# Patient Record
Sex: FEMALE | Race: WHITE
[De-identification: names, ages, dates, MRNs, and addresses within clinical notes are randomized per-mention and may not be internally consistent; named-entity substitution may affect disease eponyms.]

---

## 2017-06-07 ENCOUNTER — HOSPITAL ENCOUNTER (EMERGENCY)
Dept: HOSPITAL 75 - ER | Age: 82
Discharge: HOME | End: 2017-06-07
Payer: MEDICARE

## 2017-06-07 VITALS — SYSTOLIC BLOOD PRESSURE: 156 MMHG | DIASTOLIC BLOOD PRESSURE: 85 MMHG

## 2017-06-07 VITALS — HEIGHT: 63 IN | WEIGHT: 135 LBS | BODY MASS INDEX: 23.92 KG/M2

## 2017-06-07 DIAGNOSIS — Z87.19: ICD-10-CM

## 2017-06-07 DIAGNOSIS — L98.8: ICD-10-CM

## 2017-06-07 DIAGNOSIS — K62.5: ICD-10-CM

## 2017-06-07 DIAGNOSIS — N39.0: Primary | ICD-10-CM

## 2017-06-07 DIAGNOSIS — K59.00: ICD-10-CM

## 2017-06-07 LAB
BILIRUB UR QL STRIP: NEGATIVE
KETONES UR QL STRIP: (no result)
LEUKOCYTE ESTERASE UR QL STRIP: (no result)
NITRITE UR QL STRIP: NEGATIVE
PH UR STRIP: 5 [PH] (ref 5–9)
PROT UR QL STRIP: (no result)
SP GR UR STRIP: 1.02 (ref 1.02–1.02)
SQUAMOUS #/AREA URNS HPF: (no result) /HPF
UROBILINOGEN UR-MCNC: NORMAL MG/DL
WBC #/AREA URNS HPF: (no result) /HPF

## 2017-06-07 PROCEDURE — 81000 URINALYSIS NONAUTO W/SCOPE: CPT

## 2017-06-07 PROCEDURE — 87088 URINE BACTERIA CULTURE: CPT

## 2017-06-07 PROCEDURE — 74020: CPT

## 2017-06-07 PROCEDURE — 99284 EMERGENCY DEPT VISIT MOD MDM: CPT

## 2017-06-07 PROCEDURE — 87077 CULTURE AEROBIC IDENTIFY: CPT

## 2017-06-07 NOTE — ED GI
General


Chief Complaint:  Abdominal/GI Problems


Stated Complaint:  CONSTIPATION


Nursing Triage Note:  


CONSTIPATION SINCE SAT.  HAS TRIED TWO DIFFERENT SUPPOSITORIES.


Sepsis Screen:  No Definite Risk


Source of Information:  Patient


Exam Limitations:  No Limitations





History of Present Illness


Time Seen By Provider:  09:49


Initial Comments


This pleasant 82-year-old woman presents to the emergency room with complaints 

of difficulty producing bowel movements since Farnaz 3.  She has had a few very 

small bowel movement since then.  She has used a couple glycerin suppositories 

that were not very helpful.  She then used a medicated suppository on  

and produced another very small hard bowel movement.  The last 2 days she has 

had some small watery stools with some pink and red both in the toilet and on 

toilet paper which she presumes to be blood.  This morning she had some 

cramping.  She reports having a history of hemorrhoids and either a fistula or 

fissure.  She denies any fever, vomiting, urinary problems, or other acute 

symptoms.  Dr. Mena is her primary care provider and reportedly will be out 

of the office for a while and not available.





Allergies and Home Medications


Allergies


Coded Allergies:  


     Amoxicillin (Unverified  Allergy, Unknown, 10/16/14)


     clindamycin (Unverified  Allergy, Unknown, 10/16/14)


     sulfamethoxazole (Unverified  Allergy, Unknown, 10/16/14)


     trimethoprim (Unverified  Allergy, Unknown, 10/16/14)


Uncoded Allergies:  


     CHLORRHESILENE (Allergy, Unknown, 10/16/14)





Home Medications


Nitrofurantoin Monohyd/M-Cryst 100 Mg Capsule, 1 TAB PO BID, #10


   Prescribed by: SYLVIE MOORE on 17 1108


Polyethylene Glycol 3350 17 Gm Powd.pack, 17 GM PO BID PRN for CONSTIPATION-1ST 

LINE, #20


   Prescribed by: SYLVIE MOORE on 17 1108





Review of Systems


Constitutional:  no symptoms reported


EENTM:  No Symptoms Reported


Respiratory:  No Symptoms Reported


Cardiovascular:  No Symptoms Reported


Gastrointestinal:  See HPI


Genitourinary:  No Symptoms Reported


Musculoskeletal:  no symptoms reported


Skin:  no symptoms reported


Psychiatric/Neurological:  No Symptoms Reported


Endocrine:  No Symptoms Reported





Past Medical-Social-Family Hx


Patient Social History


Alcohol Use:  Denies Use


Recreational Drug Use:  No


Smoking Status:  Never a Smoker


Recent Foreign Travel:  No


Contact w/Someone Who Travel:  No


Recent Infectious Disease Expo:  No





Surgeries


HX Surgeries:  Yes


Surgeries:  Adenoidectomy, Breast (lumpectomy)





Respiratory


Hx Respiratory Disorders:  Yes


Respiratory Disorders:  Pneumonia





Cardiovascular


Hx Cardiac Disorders:  No





Neurological


Hx Neurological Disorders:  No





Genitourinary


Hx Genitourinary Disorders:  No





Gastrointestinal


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


Gastrointestinal Disorders:  Hemorrhoids





Musculoskeletal


Hx Musculoskeletal Disorders:  Yes





Endocrine


Hx Endocrine Disorders:  No





HEENT


HX ENT Disorders:  Yes (aerosols cause irritation)


Hearing Impairment:  Hard of Hearing





Cancer


Hx Cancer:  No





Psychosocial


Hx Psychiatric Problems:  No





Blood Transfusions


Hx Blood Disorders:  No





Physical Exam


Vital Signs





 VS - Last 72 Hours, by Label








 17





 10:02


 


Temp 98.4


 


Pulse 95


 


Resp 18


 


B/P (MAP) 148/82


 


Pulse Ox 95





Capillary Refill : Less Than 3 Seconds


General Appearance:  WD/WN, no apparent distress


HEENT:  PERRL/EOMI, normal ENT inspection


Neck:  normal inspection


Respiratory:  lungs clear, normal breath sounds, no respiratory distress, no 

accessory muscle use


Cardiovascular:  regular rate, rhythm, no edema, no murmur


Gastrointestinal:  normal bowel sounds, non tender, soft


Rectal:  normal exam, normal rectal tone, heme negative stool, No mass, No 

tenderness


Extremities:  normal inspection, no pedal edema


Neurologic/Psychiatric:  CNs II-XII nml as tested (heart of hearing), no motor/

sensory deficits, alert, normal mood/affect, oriented x 3


Skin:  normal color, warm/dry





Progress/Results/Core Measures


Results/Orders


Lab Results





Laboratory Tests








Test


  17


10:14 Range/Units


 


 


Urine Color YELLOW   


 


Urine Clarity CLEAR   


 


Urine pH 5  5-9  


 


Urine Specific Gravity 1.025 H 1.016-1.022  


 


Urine Protein 2+ H NEGATIVE  


 


Urine Glucose (UA) NEGATIVE  NEGATIVE  


 


Urine Ketones 3+ H NEGATIVE  


 


Urine Nitrite NEGATIVE  NEGATIVE  


 


Urine Bilirubin NEGATIVE  NEGATIVE  


 


Urine Urobilinogen NORMAL  NORMAL  MG/DL


 


Urine Leukocyte Esterase 2+ H NEGATIVE  


 


Urine RBC (Auto) NEGATIVE  NEGATIVE  


 


Urine RBC NONE   /HPF


 


Urine WBC 5-10 H  /HPF


 


Urine Squamous Epithelial


Cells 0-2 


   /HPF


 


 


Urine Crystals NONE   /LPF


 


Urine Bacteria FEW H  /HPF


 


Urine Casts NONE   /LPF


 


Urine Mucus SMALL H  /LPF


 


Urine Culture Indicated YES   








My Orders





Orders - SYLVIE VARGAS MD


Abdomen, Flat & Upright/Decub (17 09:49)


Ua Culture If Indicated (17 10:10)


Urine Culture (17 10:14)





Vital Signs/I&O





Vital Sign - Last 12Hours








 17





 10:02


 


Temp 98.4


 


Pulse 95


 


Resp 18


 


B/P (MAP) 148/82


 


Pulse Ox 95














Blood Pressure Mean:  104











Point of Care Testing


Fecal Occult:  Negative


Progress Note :  


Progress Note


By history, patient is presumed to have some rectal bleeding.  However, she 

does also have urinary tract infection so bleeding could have come from her 

urine.  Patient will be treated for urinary tract infection and constipation.  

Follow-up with her doctor will be recommended.





Diagnostic Imaging





   Diagonstic Imaging:  Xray


   Plain Films/CT/US/NM/MRI:  abdomen, pelvis


Comments


Abdominal x-rays reviewed by me and report reviewed.  See report below:





NAME:   VIANEY CHAMBERLAIN


[facility] REC#:   [id number]


ACCOUNT#:   [id number]


PT STATUS:   REG ER


:   1935


PHYSICIAN:   SYLVIE VARGAS MD


ADMIT DATE:   17/ER


   ***Draft***


Date of Exam:17





ABDOMEN, FLAT & UPRIGHT/DECUB








Upright and supine views of the abdomen.





INDICATION: Abdominal cramping.





FINDINGS:


There is a moderate amount of fecal material in the colon and


rectum. No bowel obstruction. No pneumoperitoneum. Kyphoscoliosis


of the thoracic and lumbar spine is seen with prominent


degenerative changes.





IMPRESSION: Moderate amounts of fecal material in the colon and


rectum seen.





  Dictated on workstation # [id number]








Dict:   17 1042


Trans:   17 1052


[facility], [place] [id number]





Interpreted by:     BRAXTON HASTINGS MD





Departure


Impression


Impression:  


 Primary Impression:  


 Constipation


 Qualified Codes:  K59.00 - Constipation, unspecified


 Additional Impressions:  


 Urinary tract infection


 Qualified Codes:  N39.0 - Urinary tract infection, site not specified


 Rectal bleeding


 Facial lesion


Disposition:  01 HOME, SELF-CARE


Condition:  Stable





Departure-Patient Inst.


Decision time for Depature:  11:00


Referrals:  


TY MENA MD (PCP/Family)


Primary Care Physician


Patient Instructions:  Constipation, Adult (DC), Urinary Tract Infection, Adult 

(DC)





Add. Discharge Instructions:  


Significantly increase your water intake.


Complete your antibiotics as prescribed.  Please be aware this antibiotic may 

cause your urine to appear oriented or red.


Follow-up with Dr. Mena as soon as possible.


Use MiraLAX (polyethylene glycol) as prescribed for constipation.


Please have your primary care provider evaluate the lesion on your left cheek.  

It may be a basal cell carcinoma.  You should consider having it removed for 

biopsy.


Return to the emergency room if symptoms worsen.











All discharge instructions reviewed with patient and/or family. Voiced 

understanding.


Scripts


Polyethylene Glycol 3350 (Miralax) 17 Gm Powd.pack


17 GM PO BID Y for CONSTIPATION-1ST LINE, #20 EACH


   Prov: SYLVIE VARGAS MD         17 


Nitrofurantoin Monohyd/M-Cryst (Macrobid 100 mg Capsule) 100 Mg Capsule


1 TAB PO BID, #10 CAP


   Prov: SYLVIE VARGAS MD         17





Copy


Copies To 1:   TY MENA MD, JOSHUA T MD 2017 11:06

## 2017-06-07 NOTE — DIAGNOSTIC IMAGING REPORT
Upright and supine views of the abdomen.



INDICATION: Abdominal cramping.



FINDINGS:

There is a moderate amount of fecal material in the colon and

rectum. No bowel obstruction. No pneumoperitoneum. Kyphoscoliosis

of the thoracic and lumbar spine is seen with prominent

degenerative changes.



IMPRESSION: Moderate amounts of fecal material in the colon and

rectum seen.



Dictated by: 



  Dictated on workstation # [id number]

## 2017-06-16 ENCOUNTER — HOSPITAL ENCOUNTER (EMERGENCY)
Dept: HOSPITAL 75 - ER | Age: 82
Discharge: HOME | End: 2017-06-16
Payer: MEDICARE

## 2017-06-16 VITALS — SYSTOLIC BLOOD PRESSURE: 120 MMHG | DIASTOLIC BLOOD PRESSURE: 64 MMHG

## 2017-06-16 VITALS — BODY MASS INDEX: 23.92 KG/M2 | WEIGHT: 135 LBS | HEIGHT: 63 IN

## 2017-06-16 DIAGNOSIS — E86.9: ICD-10-CM

## 2017-06-16 DIAGNOSIS — R19.7: ICD-10-CM

## 2017-06-16 DIAGNOSIS — Z87.19: ICD-10-CM

## 2017-06-16 DIAGNOSIS — F17.200: ICD-10-CM

## 2017-06-16 DIAGNOSIS — K62.5: Primary | ICD-10-CM

## 2017-06-16 LAB
ALBUMIN SERPL-MCNC: 3.1 GM/DL (ref 3.2–4.5)
ALT SERPL-CCNC: 6 U/L (ref 0–55)
ANION GAP SERPL CALC-SCNC: 12 MMOL/L (ref 5–14)
AST SERPL-CCNC: 17 U/L (ref 5–34)
BASOPHILS # BLD AUTO: 0 10^3/UL (ref 0–0.1)
BASOPHILS NFR BLD AUTO: 0 % (ref 0–10)
BASOPHILS NFR BLD MANUAL: 0 %
BILIRUB SERPL-MCNC: 0.5 MG/DL (ref 0.1–1)
BILIRUB UR QL STRIP: NEGATIVE
BUN SERPL-MCNC: 8 MG/DL (ref 7–18)
BUN/CREAT SERPL: 12 (ref 0–20)
CALCIUM SERPL-MCNC: 9.4 MG/DL (ref 8.5–10.1)
CHLORIDE SERPL-SCNC: 104 MMOL/L (ref 98–107)
CO2 SERPL-SCNC: 21 MMOL/L (ref 21–32)
CREAT SERPL-MCNC: 0.67 MG/DL (ref 0.6–1.3)
EOSINOPHIL # BLD AUTO: 0.1 10^3/UL (ref 0–0.3)
EOSINOPHIL NFR BLD AUTO: 1 % (ref 0–10)
EOSINOPHIL NFR BLD MANUAL: 2 %
ERYTHROCYTE [DISTWIDTH] IN BLOOD BY AUTOMATED COUNT: 12.8 % (ref 10–14.5)
GFR SERPLBLD BASED ON 1.73 SQ M-ARVRAT: > 60 ML/MIN
GLUCOSE SERPL-MCNC: 121 MG/DL (ref 70–105)
HEMOLYSIS: 43 (ref -100–29)
ICTERUS: 0.3 (ref -100–1.9)
KETONES UR QL STRIP: (no result)
LEUKOCYTE ESTERASE UR QL STRIP: NEGATIVE
LIPASE SERPL-CCNC: 42 U/L (ref 8–78)
LIPEMIA: 0 (ref -100–49)
LYMPHOCYTES # BLD AUTO: 0.5 X 10^3 (ref 1–4)
LYMPHOCYTES NFR BLD AUTO: 6 % (ref 12–44)
MCH RBC QN AUTO: 31 PG (ref 25–34)
MCHC RBC AUTO-ENTMCNC: 34 G/DL (ref 32–36)
MCV RBC AUTO: 92 FL (ref 80–99)
MONOCYTES # BLD AUTO: 1.3 X 10^3 (ref 0–1)
MONOCYTES NFR BLD AUTO: 14 % (ref 0–12)
NEUTROPHILS # BLD AUTO: 7.2 X 10^3 (ref 1.8–7.8)
NEUTROPHILS NFR BLD AUTO: 79 % (ref 42–75)
NEUTS BAND NFR BLD MANUAL: 56 %
NEUTS BAND NFR BLD: 21 %
NITRITE UR QL STRIP: NEGATIVE
PH UR STRIP: 6 [PH] (ref 5–9)
PLATELET # BLD: 313 10^3/UL (ref 130–400)
PMV BLD AUTO: 9.2 FL (ref 7.4–10.4)
POTASSIUM SERPL-SCNC: 4 MMOL/L (ref 3.6–5)
PROT SERPL-MCNC: 6.6 GM/DL (ref 6.4–8.2)
PROT UR QL STRIP: NEGATIVE
RBC # BLD AUTO: 4.32 10^6/UL (ref 4.35–5.85)
SODIUM SERPL-SCNC: 137 MMOL/L (ref 135–145)
SP GR UR STRIP: 1.01 (ref 1.02–1.02)
SQUAMOUS #/AREA URNS HPF: (no result) /HPF
UROBILINOGEN UR-MCNC: NORMAL MG/DL
VARIANT LYMPHS NFR BLD MANUAL: 9 %
WBC # BLD AUTO: 9.1 10^3/UL (ref 4.3–11)
WBC #/AREA URNS HPF: (no result) /HPF

## 2017-06-16 PROCEDURE — 81000 URINALYSIS NONAUTO W/SCOPE: CPT

## 2017-06-16 PROCEDURE — 87328 CRYPTOSPORIDIUM AG IA: CPT

## 2017-06-16 PROCEDURE — 80053 COMPREHEN METABOLIC PANEL: CPT

## 2017-06-16 PROCEDURE — 87324 CLOSTRIDIUM AG IA: CPT

## 2017-06-16 PROCEDURE — 89055 LEUKOCYTE ASSESSMENT FECAL: CPT

## 2017-06-16 PROCEDURE — 85027 COMPLETE CBC AUTOMATED: CPT

## 2017-06-16 PROCEDURE — 83690 ASSAY OF LIPASE: CPT

## 2017-06-16 PROCEDURE — 85007 BL SMEAR W/DIFF WBC COUNT: CPT

## 2017-06-16 PROCEDURE — 87449 NOS EACH ORGANISM AG IA: CPT

## 2017-06-16 PROCEDURE — 87045 FECES CULTURE AEROBIC BACT: CPT

## 2017-06-16 PROCEDURE — 87046 STOOL CULTR AEROBIC BACT EA: CPT

## 2017-06-16 PROCEDURE — 36415 COLL VENOUS BLD VENIPUNCTURE: CPT

## 2017-06-16 PROCEDURE — 87329 GIARDIA AG IA: CPT

## 2017-06-16 PROCEDURE — 74022 RADEX COMPL AQT ABD SERIES: CPT

## 2017-06-16 NOTE — ED GI
General


Chief Complaint:  Abdominal/GI Problems


Stated Complaint:  DIARRHEA/BLOODY STOOL


Nursing Triage Note:  


ADM TO ED REPORTS HAS BEEN ON ANTIBIOTICS FOR  UTI AND CONSTIPATION. IS HAVING  


BLOOD IN STOOL


Sepsis Screen:  No Definite Risk


Source of Information:  Patient


Exam Limitations:  No Limitations





History of Present Illness


Time Seen By Provider:  11:55


Initial Comments


82-year-old female patient presents to the emergency department with complaints 

of diarrhea, rectal bleeding, and generalized abdominal cramping.  Was seen 

here one week ago by Dr. Adams with complaints of constipation, UTI, and 

rectal bleeding.  Rectal exam performed by Dr. Adams at last ED visit.  

Denies fever, chills, vomiting.  Patient's antibiotics were changed Monday to 

ciprofloxacin due to resistance of bacteria to Macrobid.  Patient states she 

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  Patient is scheduled with 

Dr. Dean July 8th to establish care.


Timing/Duration:  2-3 Days, Getting Worse


Severity/Quality:  Cramping


Location:  Generalized Abdomen


Radiation:  No Radiation


Activities at Onset:  None


Modifying Factors:  Worsens With Defecating





Allergies and Home Medications


Allergies


Coded Allergies:  


     Amoxicillin (Unverified  Allergy, Unknown, 10/16/14)


     clindamycin (Unverified  Allergy, Unknown, 10/16/14)


     sulfamethoxazole (Unverified  Allergy, Unknown, 10/16/14)


     trimethoprim (Unverified  Allergy, Unknown, 10/16/14)


Uncoded Allergies:  


     CHLORRHESILENE (Allergy, Unknown, 10/16/14)





Home Medications


Ciprofloxacin HCl 500 Mg Tablet, 500 MG PO BID for 7 Days, (Reported)


Ondansetron 8 Mg Tab.rapdis, 8 MG PO Q6H PRN for NAUSEA/VOMITING-1ST LINE, #10 

Ref 0


   Prescribed by: GRACE TANNER on 6/16/17 1540


Polyethylene Glycol 3350 17 Gm Powd.pack, 17 GM PO BID PRN for CONSTIPATION-1ST 

LINE, #20


   Prescribed by: SYLVIE MOORE on 6/7/17 1108





Review of Systems


Constitutional:  No chills, No fever, No malaise


Respiratory:  No Symptoms Reported


Cardiovascular:  No Symptoms Reported


Gastrointestinal:  Denies Abdomen Distended, Abdominal Pain (generalized 

abdominal cramping), Denies Constipated, Diarrhea, Denies Nausea, Denies Poor 

Appetite, Denies Poor Fluid Intake, Rectal Bleeding, Denies Vomiting


Genitourinary:  Denies Burning, Denies Frequency, Denies Flank Pain, Denies Pain


Skin:  no symptoms reported


Psychiatric/Neurological:  No Symptoms Reported





All Other Systems Reviewed


Negative Unless Noted:  Yes (Negative excepted noted.)





Past Medical-Social-Family Hx


Patient Social History


Alcohol Use:  Denies Use


Recreational Drug Use:  No


Smoking Status:  Current Everyday Smoker


Recent Foreign Travel:  No


Contact w/Someone Who Travel:  No


Recent Infectious Disease Expo:  No





Surgeries


HX Surgeries:  Yes


Surgeries:  Adenoidectomy, Breast





Respiratory


Hx Respiratory Disorders:  Yes


Respiratory Disorders:  Pneumonia





Cardiovascular


Hx Cardiac Disorders:  No





Neurological


Hx Neurological Disorders:  No





Genitourinary


Hx Genitourinary Disorders:  No





Gastrointestinal


Hx Gastrointestinal Disorders:  Yes (anal fissure)


Gastrointestinal Disorders:  Hemorrhoids





Musculoskeletal


Hx Musculoskeletal Disorders:  Yes





Endocrine


Hx Endocrine Disorders:  No





HEENT


HX ENT Disorders:  Yes (aerosols cause irritation)


Hearing Impairment:  Hard of Hearing





Cancer


Hx Cancer:  No





Psychosocial


Hx Psychiatric Problems:  No





Blood Transfusions


Hx Blood Disorders:  No





Reviewed Nursing Assessment


Reviewed/Agree w Nursing PMH:  Yes





Family Medical History


Significant Family History:  No Pertinent Family Hx





Physical Exam


Vital Signs





 VS - Last 72 Hours, by Label








 6/16/17 6/16/17





 11:40 15:55


 


Temp 98.4 


 


Pulse 93 88


 


Resp 18 18


 


B/P (MAP) 149/97 


 


Pulse Ox  95


 


O2 Delivery  Room Air





Capillary Refill : Less Than 3 Seconds


General Appearance:  WD/WN, no apparent distress


Respiratory:  lungs clear, normal breath sounds, no respiratory distress


Cardiovascular:  regular rate, rhythm, no murmur


Gastrointestinal:  normal bowel sounds, soft, no organomegaly, No distended, No 

guarding, No rebound, tenderness (mild TTP rt lower abdomen with deep palpation 

only.), No mass


Extremities:  normal capillary refill


Back:  normal inspection, no CVA tenderness


Neurologic/Psychiatric:  alert, normal mood/affect, oriented x 3


Skin:  normal color, warm/dry





Progress/Results/Core Measures


Results/Orders


Lab Results





Laboratory Tests








Test


  6/16/17


13:04 6/16/17


13:58 Range/Units


 


 


White Blood Count


  9.1 


  


  4.3-11.0


10^3/uL


 


Red Blood Count


  4.32 L


  


  4.35-5.85


10^6/uL


 


Hemoglobin 13.5   11.5-16.0  G/DL


 


Hematocrit 40   35-52  %


 


Mean Corpuscular Volume 92   80-99  FL


 


Mean Corpuscular Hemoglobin 31   25-34  PG


 


Mean Corpuscular Hemoglobin


Concent 34 


  


  32-36  G/DL


 


 


Red Cell Distribution Width 12.8   10.0-14.5  %


 


Platelet Count


  313 


  


  130-400


10^3/uL


 


Mean Platelet Volume 9.2   7.4-10.4  FL


 


Neutrophils (%) (Auto) 79 H  42-75  %


 


Lymphocytes (%) (Auto) 6 L  12-44  %


 


Monocytes (%) (Auto) 14 H  0-12  %


 


Eosinophils (%) (Auto) 1   0-10  %


 


Basophils (%) (Auto) 0   0-10  %


 


Neutrophils # (Auto) 7.2   1.8-7.8  X 10^3


 


Lymphocytes # (Auto) 0.5 L  1.0-4.0  X 10^3


 


Monocytes # (Auto) 1.3 H  0.0-1.0  X 10^3


 


Eosinophils # (Auto)


  0.1 


  


  0.0-0.3


10^3/uL


 


Basophils # (Auto)


  0.0 


  


  0.0-0.1


10^3/uL


 


Neutrophils % (Manual) 56    %


 


Lymphocytes % (Manual) 9    %


 


Monocytes % (Manual) 12    %


 


Eosinophils % (Manual) 2    %


 


Basophils % (Manual) 0    %


 


Band Neutrophils 21    %


 


Blood Morphology Comment NORMAL    


 


Sodium Level 137   135-145  MMOL/L


 


Potassium Level 4.0   3.6-5.0  MMOL/L


 


Chloride Level 104     MMOL/L


 


Carbon Dioxide Level 21   21-32  MMOL/L


 


Anion Gap 12   5-14  MMOL/L


 


Blood Urea Nitrogen 8   7-18  MG/DL


 


Creatinine


  0.67 


  


  0.60-1.30


MG/DL


 


Estimat Glomerular Filtration


Rate > 60 


  


   


 


 


BUN/Creatinine Ratio 12   0-20  


 


Glucose Level 121 H    MG/DL


 


Calcium Level 9.4   8.5-10.1  MG/DL


 


Total Bilirubin 0.5   0.1-1.0  MG/DL


 


Aspartate Amino Transf


(AST/SGOT) 17 


  


  5-34  U/L


 


 


Alanine Aminotransferase


(ALT/SGPT) 6 


  


  0-55  U/L


 


 


Alkaline Phosphatase 107     U/L


 


Total Protein 6.6   6.4-8.2  GM/DL


 


Albumin 3.1 L  3.2-4.5  GM/DL


 


Lipase 42   8-78  U/L


 


Urine Color  YELLOW   


 


Urine Clarity  CLEAR   


 


Urine pH  6  5-9  


 


Urine Specific Gravity  1.010 L 1.016-1.022  


 


Urine Protein  NEGATIVE  NEGATIVE  


 


Urine Glucose (UA)  NEGATIVE  NEGATIVE  


 


Urine Ketones  2+ H NEGATIVE  


 


Urine Nitrite  NEGATIVE  NEGATIVE  


 


Urine Bilirubin  NEGATIVE  NEGATIVE  


 


Urine Urobilinogen  NORMAL  NORMAL  MG/DL


 


Urine Leukocyte Esterase  NEGATIVE  NEGATIVE  


 


Urine RBC (Auto)  NEGATIVE  NEGATIVE  


 


Urine RBC  NONE   /HPF


 


Urine WBC  NONE   /HPF


 


Urine Squamous Epithelial


Cells 


  2-5 


   /HPF


 


 


Urine Crystals  NONE   /LPF


 


Urine Bacteria  NEGATIVE   /HPF


 


Urine Casts  NONE   /LPF


 


Urine Mucus  NEGATIVE   /LPF


 


Urine Culture Indicated  NO   








Micro Results





Microbiology


6/16/17 Fecal Leukocyte Stain, Resulted


          Pending


6/16/17 C. difficile GDH Antigen & Toxins - Final, Resulted


          


6/16/17 Stool Culture, Resulted


          Pending





My Orders





Orders - GRACE TANNER


Cbc With Automated Diff (6/16/17 12:10)


Comprehensive Metabolic Panel (6/16/17 12:10)


Lipase (6/16/17 12:10)


Ua Culture If Indicated (6/16/17 12:10)


Saline Lock/Iv-Start (6/16/17 12:10)


Acute Abd Series (6/16/17 12:10)


Ns Iv 1000 Ml (Sodium Chloride 0.9%) (6/16/17 12:10)


Manual Differential (6/16/17 13:04)


Stool Culture (6/16/17 14:02)


Fecal Wbc (6/16/17 14:02)


C Difficile Ag + Toxin A/B. (6/16/17 14:02)


Fecal Occult Bedside (6/16/17 14:03)


Parasite Scrn Stool Giard Cryp (6/16/17 13:59)





Medications Given in ED





Vital Signs/I&O





Vital Sign - Last 12Hours








 6/16/17 6/16/17





 11:40 15:55


 


Temp 98.4 


 


Pulse 93 88


 


Resp 18 18


 


B/P (MAP) 149/97 


 


Pulse Ox  95


 


O2 Delivery  Room Air














Intake and Output 


 


 6/17/17





 00:00


 


Intake Total 1000 ml


 


Balance 1000 ml














Blood Pressure Mean:  114











Diagnostic Imaging





   Diagonstic Imaging:  Xray


   Plain Films/CT/US/NM/MRI:  abdomen


Comments


DISCUSSION: Supine and upright views of the abdomen and chest were obtained, 

comparison 06/07/2017. Changes of chronic lung disease are noted with pulmonary 

hyperinflation and interstitial thickening. Normal heart size. No pleural fluid 

or pneumothorax. Levoscoliosis of the thoracolumbar spine is stable. 

Nonobstructive unremarkable bowel gas pattern. No constipation. Nondilated 

small bowel loops are noted which could be seen with enteritis or other 

inflammatory etiologies. Recommend clinical correlation. IMPRESSION: 1. 

Nonobstructive abnormal small bowel gas pattern is nonspecific though could be 

seen with enteritis. No evidence of obstruction or free air. No constipation. 

Dictated by: Dictated on workstation # [id number]


   Reviewed:  Reviewed by Me (radiology report reviewed by me. )





Departure


Communication


Progress Notes


All laboratory findings discussed with the patient.  One diarrhea stool noted 

while in the emergency department.  Patient to follow-up with Dr. Dean as 

previously scheduled or sooner if needed.  Plan for discharge to home.





Impression


Impression:  


 Primary Impression:  


 Diarrhea


 Qualified Codes:  R19.7 - Diarrhea, unspecified


 Additional Impressions:  


 Rectal bleeding


 Volume depletion


Disposition:  01 HOME, SELF-CARE


Condition:  Improved





Departure-Patient Inst.


Decision time for Depature:  14:19


Referrals:  


FIDEL YADAV MD, JOHN D MD (PCP/Family)


Primary Care Physician


Patient Instructions:  Bloody Stools, Adult (DC), Diarrhea in Adolescents and 

Adults, Dehydration, Adult (DC)





Add. Discharge Instructions:  


All discharge instructions reviewed with patient and/or family. Voiced 

understanding.  Stop ciprofloxacin.  Drink plenty of fluids.  Medications as 

instructed.  Follow-up with Dr. Dean's office for recheck as an outpatient, 

call for appointment time Monday morning.  Follow-up with Dr. Yadav office 

for possible need of colonoscopy.  Call for appointment time.  Return to the 

emergency department for worsened pain, rectal bleeding, vomiting, decreased 

urination, fever, or any other concerns.


Scripts


Ondansetron (Ondansetron Odt) 8 Mg Tab.rapdis


8 MG PO Q6H Y for NAUSEA/VOMITING-1ST LINE, #10 TAB 0 Refills


   Prov: GRACE TANNER         6/16/17











GRACE TANNER Jun 16, 2017 12:28

## 2017-06-30 ENCOUNTER — HOSPITAL ENCOUNTER (EMERGENCY)
Dept: HOSPITAL 75 - ER | Age: 82
Discharge: HOME | End: 2017-06-30
Payer: MEDICARE

## 2017-06-30 VITALS — SYSTOLIC BLOOD PRESSURE: 114 MMHG | DIASTOLIC BLOOD PRESSURE: 65 MMHG

## 2017-06-30 VITALS — BODY MASS INDEX: 22.32 KG/M2 | WEIGHT: 126 LBS | HEIGHT: 63 IN

## 2017-06-30 DIAGNOSIS — R19.7: Primary | ICD-10-CM

## 2017-06-30 LAB
ALBUMIN SERPL-MCNC: 2.5 GM/DL (ref 3.2–4.5)
ALT SERPL-CCNC: 11 U/L (ref 0–55)
ANION GAP SERPL CALC-SCNC: 12 MMOL/L (ref 5–14)
AST SERPL-CCNC: 19 U/L (ref 5–34)
BASOPHILS # BLD AUTO: 0 10^3/UL (ref 0–0.1)
BASOPHILS NFR BLD AUTO: 1 % (ref 0–10)
BASOPHILS NFR BLD MANUAL: 1 %
BILIRUB SERPL-MCNC: 0.8 MG/DL (ref 0.1–1)
BUN SERPL-MCNC: 8 MG/DL (ref 7–18)
BUN/CREAT SERPL: 9
CALCIUM SERPL-MCNC: 8.8 MG/DL (ref 8.5–10.1)
CHLORIDE SERPL-SCNC: 101 MMOL/L (ref 98–107)
CO2 SERPL-SCNC: 22 MMOL/L (ref 21–32)
CREAT SERPL-MCNC: 0.89 MG/DL (ref 0.6–1.3)
EOSINOPHIL # BLD AUTO: 0.1 10^3/UL (ref 0–0.3)
EOSINOPHIL NFR BLD AUTO: 1 % (ref 0–10)
EOSINOPHIL NFR BLD MANUAL: 0 %
ERYTHROCYTE [DISTWIDTH] IN BLOOD BY AUTOMATED COUNT: 13.1 % (ref 10–14.5)
GFR SERPLBLD BASED ON 1.73 SQ M-ARVRAT: > 60 ML/MIN
GLUCOSE SERPL-MCNC: 151 MG/DL (ref 70–105)
LYMPHOCYTES # BLD AUTO: 0.6 X 10^3 (ref 1–4)
LYMPHOCYTES NFR BLD AUTO: 7 % (ref 12–44)
MCH RBC QN AUTO: 30 PG (ref 25–34)
MCHC RBC AUTO-ENTMCNC: 34 G/DL (ref 32–36)
MCV RBC AUTO: 89 FL (ref 80–99)
MONOCYTES # BLD AUTO: 1.3 X 10^3 (ref 0–1)
MONOCYTES NFR BLD AUTO: 16 % (ref 0–12)
NEUTROPHILS # BLD AUTO: 6.1 X 10^3 (ref 1.8–7.8)
NEUTROPHILS NFR BLD AUTO: 76 % (ref 42–75)
NEUTS BAND NFR BLD MANUAL: 64 %
NEUTS BAND NFR BLD: 16 %
PLATELET # BLD: 508 10^3/UL (ref 130–400)
PMV BLD AUTO: 9 FL (ref 7.4–10.4)
POTASSIUM SERPL-SCNC: 3.6 MMOL/L (ref 3.6–5)
PROT SERPL-MCNC: 5.9 GM/DL (ref 6.4–8.2)
RBC # BLD AUTO: 4.33 10^6/UL (ref 4.35–5.85)
SODIUM SERPL-SCNC: 135 MMOL/L (ref 135–145)
VARIANT LYMPHS NFR BLD MANUAL: 8 %
WBC # BLD AUTO: 8.1 10^3/UL (ref 4.3–11)

## 2017-06-30 PROCEDURE — 80053 COMPREHEN METABOLIC PANEL: CPT

## 2017-06-30 PROCEDURE — 36415 COLL VENOUS BLD VENIPUNCTURE: CPT

## 2017-06-30 PROCEDURE — 74000: CPT

## 2017-06-30 PROCEDURE — 85007 BL SMEAR W/DIFF WBC COUNT: CPT

## 2017-06-30 PROCEDURE — 85027 COMPLETE CBC AUTOMATED: CPT

## 2017-06-30 NOTE — DIAGNOSTIC IMAGING REPORT
Supine view of the abdomen.



INDICATION: Abdominal cramping.



FINDINGS:

There is a moderate to large amounts of fecal material seen in

the distal colon and rectum. No significantly dilated bowel loops

seen. No soft tissue mass is evident. 2 mm calcification in the

left flank could be a kidney stone. There is also right flank

calcifications indeterminate. There is prominent scoliosis and

degenerative changes in the lumbar spine.



IMPRESSION: Moderate to large amounts of fecal material in the

distal colon and rectum.



Dictated by: 



  Dictated on workstation # [id number]

## 2017-06-30 NOTE — ED GI
General


Chief Complaint:  Abdominal/GI Problems


Stated Complaint:  DIARRHEA


Nursing Triage Note:  


PT STATES DIARRHEA AND ABD CRAMPING OFF AND ON FOR A MONTH.  HAS BEEN SEEN HERE 


AND AT THE CLINIC OVER THE LAST MONTH.  ALSO NECK PAIN FROM STRAINING TO GET UP 


BUT HAS A HX OF NECK PAIN.  VOMITED ONCE THIS A.M.  RECENT UTI AND YEAST 


INFECTION.


Sepsis Screen:  No Definite Risk


Source of Information:  Patient, Family


Exam Limitations:  No Limitations





History of Present Illness


Time Seen By Provider:  12:20


Initial Comments


The patient is an 82-year-old white female who is on her third ER visit this 

month.  She was previously here on 6/7 complaining of constipation.  She then 

returned on 6/16 complaining of diarrhea.  In the interim she had taken Cipro 

from 6/12 through 16 and a study was done for Clostridium difficile which was 

negative.  She continues to complain of diarrhea.  She states that she had the 

sense that she has to go frequently and then passes a small amount of liquid 

stool.  There is crampy abdominal pain but not severe pain she has trimmed her 

diet to a mostly liquid diet.


Timing/Duration:  Other (1 month)





Allergies and Home Medications


Allergies


Coded Allergies:  


     Amoxicillin (Unverified  Allergy, Unknown, 10/16/14)


     clindamycin (Unverified  Allergy, Unknown, 10/16/14)


     sulfamethoxazole (Unverified  Allergy, Unknown, 10/16/14)


     trimethoprim (Unverified  Allergy, Unknown, 10/16/14)


Uncoded Allergies:  


     CHLORRHESILENE (Allergy, Unknown, 10/16/14)





Home Medications


Ciprofloxacin HCl 500 Mg Tablet, 500 MG PO BID for 7 Days, (Reported)


Ondansetron 8 Mg Tab.rapdis, 8 MG PO Q6H PRN for NAUSEA/VOMITING-1ST LINE, #10 

Ref 0


   Prescribed by: GRACE TANNER on 6/16/17 1540


Polyethylene Glycol 3350 17 Gm Powd.pack, 17 GM PO BID PRN for CONSTIPATION-1ST 

LINE, #20


   Prescribed by: SYLVIE MOORE on 6/7/17 1108





Review of Systems


Constitutional:  see HPI


EENTM:  No Symptoms Reported


Respiratory:  No Symptoms Reported


Cardiovascular:  No Symptoms Reported


Gastrointestinal:  See HPI, Abdomen Distended, Abdominal Pain, Diarrhea, Nausea


Genitourinary:  No Symptoms Reported


Musculoskeletal:  no symptoms reported


Skin:  no symptoms reported


Psychiatric/Neurological:  No Symptoms Reported


Endocrine:  No Symptoms Reported


Hematologic/Lymphatic:  No Symptoms Reported





Past Medical-Social-Family Hx


Patient Social History


Alcohol Use:  Denies Use


Recreational Drug Use:  No


Smoking Status:  Never a Smoker


Recent Foreign Travel:  No


Contact w/Someone Who Travel:  No


Recent Infectious Disease Expo:  No


Recent Hopitalizations:  No





Seasonal Allergies


Seasonal Allergies:  No





Surgeries


HX Surgeries:  Yes


Surgeries:  Adenoidectomy, Breast





Respiratory


Hx Respiratory Disorders:  Yes


Respiratory Disorders:  Pneumonia





Cardiovascular


Hx Cardiac Disorders:  No





Neurological


Hx Neurological Disorders:  No





Genitourinary


Hx Genitourinary Disorders:  No





Gastrointestinal


Hx Gastrointestinal Disorders:  Yes (anal fissure)


Gastrointestinal Disorders:  Hemorrhoids





Musculoskeletal


Hx Musculoskeletal Disorders:  Yes





Endocrine


Hx Endocrine Disorders:  No





HEENT


HX ENT Disorders:  Yes (aerosols cause irritation)


Hearing Impairment:  Hard of Hearing





Cancer


Hx Cancer:  No





Psychosocial


Hx Psychiatric Problems:  No





Blood Transfusions


Hx Blood Disorders:  No





Family Medical History


Significant Family History:  No Pertinent Family Hx





Physical Exam


Vital Signs





 VS - Last 72 Hours, by Label








 6/30/17





 10:54


 


Temp 98.4


 


Pulse 96


 


Resp 20


 


B/P (MAP) 140/65


 


Pulse Ox 95


 


O2 Delivery Room Air





Capillary Refill : Less Than 3 Seconds


General Appearance:  no apparent distress, other (somewhat hard of hearing)


HEENT:  normal ENT inspection


Neck:  full range of motion


Respiratory:  chest non-tender, lungs clear, normal breath sounds, no 

respiratory distress, no accessory muscle use


Cardiovascular:  normal peripheral pulses, regular rate, rhythm, no edema, no 

gallop, no JVD, no murmur


Gastrointestinal:  normal bowel sounds, other (fullness to palpation in the 

pelvis with minimal pain to palpation)


Rectal:  other (no stool to palpation in the rectal vault but palpable stool 

through the bowel wall above it)


Extremities:  normal range of motion, non-tender, normal inspection, no pedal 

edema, no calf tenderness, normal capillary refill, pelvis stable


Pelvic:  other (lumbar scoliosis)


Neurologic/Psychiatric:  CNs II-XII nml as tested, no motor/sensory deficits, 

alert, normal mood/affect, oriented x 3


Skin:  normal color, warm/dry





Progress/Results/Core Measures


Results/Orders


Lab Results





Laboratory Tests








Test


  6/30/17


11:25 Range/Units


 


 


White Blood Count


  8.1 


  4.3-11.0


10^3/uL


 


Red Blood Count


  4.33 L


  4.35-5.85


10^6/uL


 


Hemoglobin 13.0  11.5-16.0  G/DL


 


Hematocrit 39  35-52  %


 


Mean Corpuscular Volume 89  80-99  FL


 


Mean Corpuscular Hemoglobin 30  25-34  PG


 


Mean Corpuscular Hemoglobin


Concent 34 


  32-36  G/DL


 


 


Red Cell Distribution Width 13.1  10.0-14.5  %


 


Platelet Count


  508 H


  130-400


10^3/uL


 


Mean Platelet Volume 9.0  7.4-10.4  FL


 


Neutrophils (%) (Auto) 76 H 42-75  %


 


Lymphocytes (%) (Auto) 7 L 12-44  %


 


Monocytes (%) (Auto) 16 H 0-12  %


 


Eosinophils (%) (Auto) 1  0-10  %


 


Basophils (%) (Auto) 1  0-10  %


 


Neutrophils # (Auto) 6.1  1.8-7.8  X 10^3


 


Lymphocytes # (Auto) 0.6 L 1.0-4.0  X 10^3


 


Monocytes # (Auto) 1.3 H 0.0-1.0  X 10^3


 


Eosinophils # (Auto)


  0.1 


  0.0-0.3


10^3/uL


 


Basophils # (Auto)


  0.0 


  0.0-0.1


10^3/uL


 


Neutrophils % (Manual) 64   %


 


Lymphocytes % (Manual) 8   %


 


Monocytes % (Manual) 11   %


 


Eosinophils % (Manual) 0   %


 


Basophils % (Manual) 1   %


 


Band Neutrophils 16   %


 


Blood Morphology Comment NORMAL   


 


Sodium Level 135  135-145  MMOL/L


 


Potassium Level 3.6  3.6-5.0  MMOL/L


 


Chloride Level 101    MMOL/L


 


Carbon Dioxide Level 22  21-32  MMOL/L


 


Anion Gap 12  5-14  MMOL/L


 


Blood Urea Nitrogen 8  7-18  MG/DL


 


Creatinine


  0.89 


  0.60-1.30


MG/DL


 


Estimat Glomerular Filtration


Rate > 60 


   


 


 


BUN/Creatinine Ratio 9   


 


Glucose Level 151 H   MG/DL


 


Calcium Level 8.8  8.5-10.1  MG/DL


 


Total Bilirubin 0.8  0.1-1.0  MG/DL


 


Aspartate Amino Transf


(AST/SGOT) 19 


  5-34  U/L


 


 


Alanine Aminotransferase


(ALT/SGPT) 11 


  0-55  U/L


 


 


Alkaline Phosphatase 106    U/L


 


Total Protein 5.9 L 6.4-8.2  GM/DL


 


Albumin 2.5 L 3.2-4.5  GM/DL








My Orders





Orders - VALENTÍN AMATO MD


Cbc With Automated Diff (6/30/17 11:07)


Comprehensive Metabolic Panel (6/30/17 11:07)


Abdomen/Kub 1view (6/30/17 11:07)


Manual Differential (6/30/17 11:25)





Vital Signs/I&O





Vital Sign - Last 12Hours








 6/30/17





 10:54


 


Temp 98.4


 


Pulse 96


 


Resp 20


 


B/P (MAP) 140/65


 


Pulse Ox 95


 


O2 Delivery Room Air














Blood Pressure Mean:  90











Departure


Communication


Progress Notes


The KUB shows considerable stool in the pelvis and large bowel.  There is 

considerable gas in the transverse colon with out evidence of obstruction





Impression


Impression:  


 Primary Impression:  


 obstipation


Disposition:  01 HOME, SELF-CARE


Condition:  Stable/Unchanged





Departure-Patient Inst.


Decision time for Depature:  12:35


Referrals:  


TY MENA MD (PCP/Family)


Primary Care Physician


Patient Instructions:  Constipation, Adult (DC)











VALENTÍN AMATO MD Jun 30, 2017 12:33

## 2017-07-04 ENCOUNTER — HOSPITAL ENCOUNTER (INPATIENT)
Dept: HOSPITAL 75 - ER | Age: 82
LOS: 3 days | Discharge: NURSING, REHAB OR LONG-TERM CARE | DRG: 470 | End: 2017-07-07
Attending: ORTHOPAEDIC SURGERY | Admitting: ORTHOPAEDIC SURGERY
Payer: MEDICARE

## 2017-07-04 VITALS — WEIGHT: 126 LBS | HEIGHT: 63 IN | BODY MASS INDEX: 22.32 KG/M2

## 2017-07-04 VITALS — SYSTOLIC BLOOD PRESSURE: 118 MMHG | DIASTOLIC BLOOD PRESSURE: 68 MMHG

## 2017-07-04 VITALS — SYSTOLIC BLOOD PRESSURE: 128 MMHG | DIASTOLIC BLOOD PRESSURE: 58 MMHG

## 2017-07-04 DIAGNOSIS — K52.9: ICD-10-CM

## 2017-07-04 DIAGNOSIS — S76.011A: ICD-10-CM

## 2017-07-04 DIAGNOSIS — R60.0: ICD-10-CM

## 2017-07-04 DIAGNOSIS — E87.6: ICD-10-CM

## 2017-07-04 DIAGNOSIS — E87.1: ICD-10-CM

## 2017-07-04 DIAGNOSIS — M25.571: ICD-10-CM

## 2017-07-04 DIAGNOSIS — S72.011A: Primary | ICD-10-CM

## 2017-07-04 DIAGNOSIS — M54.2: ICD-10-CM

## 2017-07-04 DIAGNOSIS — M25.561: ICD-10-CM
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
## 2017-07-04 DIAGNOSIS — E46: ICD-10-CM

## 2017-07-04 LAB
ALBUMIN SERPL-MCNC: 2.1 GM/DL (ref 3.2–4.5)
ALT SERPL-CCNC: 14 U/L (ref 0–55)
ANION GAP SERPL CALC-SCNC: 6 MMOL/L (ref 5–14)
APTT BLD: 30 SEC (ref 24–35)
AST SERPL-CCNC: 20 U/L (ref 5–34)
BASOPHILS # BLD AUTO: 0 10^3/UL (ref 0–0.1)
BASOPHILS NFR BLD AUTO: 0 % (ref 0–10)
BASOPHILS NFR BLD MANUAL: 0 %
BILIRUB SERPL-MCNC: 0.5 MG/DL (ref 0.1–1)
BILIRUB UR QL STRIP: NEGATIVE
BUN SERPL-MCNC: 19 MG/DL (ref 7–18)
BUN/CREAT SERPL: 15
CALCIUM SERPL-MCNC: 8 MG/DL (ref 8.5–10.1)
CHLORIDE SERPL-SCNC: 101 MMOL/L (ref 98–107)
CO2 SERPL-SCNC: 26 MMOL/L (ref 21–32)
CREAT SERPL-MCNC: 1.27 MG/DL (ref 0.6–1.3)
EOSINOPHIL # BLD AUTO: 0 10^3/UL (ref 0–0.3)
EOSINOPHIL NFR BLD AUTO: 0 % (ref 0–10)
EOSINOPHIL NFR BLD MANUAL: 0 %
ERYTHROCYTE [DISTWIDTH] IN BLOOD BY AUTOMATED COUNT: 13.3 % (ref 10–14.5)
GFR SERPLBLD BASED ON 1.73 SQ M-ARVRAT: 40 ML/MIN
GLUCOSE SERPL-MCNC: 158 MG/DL (ref 70–105)
INR PPP: 1.3 (ref 0.8–1.4)
KETONES UR QL STRIP: NEGATIVE
LEUKOCYTE ESTERASE UR QL STRIP: (no result)
LYMPHOCYTES # BLD AUTO: 0.6 X 10^3 (ref 1–4)
LYMPHOCYTES NFR BLD AUTO: 6 % (ref 12–44)
MCH RBC QN AUTO: 30 PG (ref 25–34)
MCHC RBC AUTO-ENTMCNC: 33 G/DL (ref 32–36)
MCV RBC AUTO: 89 FL (ref 80–99)
MONOCYTES # BLD AUTO: 1.1 X 10^3 (ref 0–1)
MONOCYTES NFR BLD AUTO: 11 % (ref 0–12)
NEUTROPHILS # BLD AUTO: 8.2 X 10^3 (ref 1.8–7.8)
NEUTROPHILS NFR BLD AUTO: 82 % (ref 42–75)
NEUTS BAND NFR BLD MANUAL: 80 %
NEUTS BAND NFR BLD: 8 %
NITRITE UR QL STRIP: NEGATIVE
PH UR STRIP: 6 [PH] (ref 5–9)
PLATELET # BLD: 367 10^3/UL (ref 130–400)
PMV BLD AUTO: 8.5 FL (ref 7.4–10.4)
POTASSIUM SERPL-SCNC: 3.3 MMOL/L (ref 3.6–5)
PROT SERPL-MCNC: 4.9 GM/DL (ref 6.4–8.2)
PROT UR QL STRIP: NEGATIVE
PROTHROMBIN TIME: 16.2 SEC (ref 12.2–14.7)
RBC # BLD AUTO: 3.82 10^6/UL (ref 4.35–5.85)
SODIUM SERPL-SCNC: 133 MMOL/L (ref 135–145)
SP GR UR STRIP: 1.01 (ref 1.02–1.02)
SQUAMOUS #/AREA URNS HPF: (no result) /HPF
UROBILINOGEN UR-MCNC: NORMAL MG/DL
VARIANT LYMPHS NFR BLD MANUAL: 10 %
WBC # BLD AUTO: 9.9 10^3/UL (ref 4.3–11)
WBC #/AREA URNS HPF: (no result) /HPF

## 2017-07-04 PROCEDURE — 87449 NOS EACH ORGANISM AG IA: CPT

## 2017-07-04 PROCEDURE — 96374 THER/PROPH/DIAG INJ IV PUSH: CPT

## 2017-07-04 PROCEDURE — 36415 COLL VENOUS BLD VENIPUNCTURE: CPT

## 2017-07-04 PROCEDURE — 81000 URINALYSIS NONAUTO W/SCOPE: CPT

## 2017-07-04 PROCEDURE — 85007 BL SMEAR W/DIFF WBC COUNT: CPT

## 2017-07-04 PROCEDURE — 72170 X-RAY EXAM OF PELVIS: CPT

## 2017-07-04 PROCEDURE — 86850 RBC ANTIBODY SCREEN: CPT

## 2017-07-04 PROCEDURE — 87081 CULTURE SCREEN ONLY: CPT

## 2017-07-04 PROCEDURE — 0SRR01A REPLACEMENT OF RIGHT HIP JOINT, FEMORAL SURFACE WITH METAL SYNTHETIC SUBSTITUTE, UNCEMENTED, OPEN APPROACH: ICD-10-PCS | Performed by: ORTHOPAEDIC SURGERY

## 2017-07-04 PROCEDURE — 86900 BLOOD TYPING SEROLOGIC ABO: CPT

## 2017-07-04 PROCEDURE — 80053 COMPREHEN METABOLIC PANEL: CPT

## 2017-07-04 PROCEDURE — 85730 THROMBOPLASTIN TIME PARTIAL: CPT

## 2017-07-04 PROCEDURE — 73502 X-RAY EXAM HIP UNI 2-3 VIEWS: CPT

## 2017-07-04 PROCEDURE — 73610 X-RAY EXAM OF ANKLE: CPT

## 2017-07-04 PROCEDURE — 85025 COMPLETE CBC W/AUTO DIFF WBC: CPT

## 2017-07-04 PROCEDURE — 73562 X-RAY EXAM OF KNEE 3: CPT

## 2017-07-04 PROCEDURE — 94664 DEMO&/EVAL PT USE INHALER: CPT

## 2017-07-04 PROCEDURE — 93005 ELECTROCARDIOGRAM TRACING: CPT

## 2017-07-04 PROCEDURE — 80048 BASIC METABOLIC PNL TOTAL CA: CPT

## 2017-07-04 PROCEDURE — 96361 HYDRATE IV INFUSION ADD-ON: CPT

## 2017-07-04 PROCEDURE — 51702 INSERT TEMP BLADDER CATH: CPT

## 2017-07-04 PROCEDURE — 86901 BLOOD TYPING SEROLOGIC RH(D): CPT

## 2017-07-04 PROCEDURE — 87324 CLOSTRIDIUM AG IA: CPT

## 2017-07-04 PROCEDURE — 0LQJ0ZZ REPAIR RIGHT HIP TENDON, OPEN APPROACH: ICD-10-PCS | Performed by: ORTHOPAEDIC SURGERY

## 2017-07-04 PROCEDURE — 72192 CT PELVIS W/O DYE: CPT

## 2017-07-04 PROCEDURE — 85610 PROTHROMBIN TIME: CPT

## 2017-07-04 PROCEDURE — 85027 COMPLETE CBC AUTOMATED: CPT

## 2017-07-04 RX ADMIN — SODIUM CHLORIDE AND POTASSIUM CHLORIDE SCH MLS/HR: 9; 1.49 INJECTION, SOLUTION INTRAVENOUS at 23:13

## 2017-07-04 RX ADMIN — SODIUM CHLORIDE AND POTASSIUM CHLORIDE SCH MLS/HR: 9; 1.49 INJECTION, SOLUTION INTRAVENOUS at 16:18

## 2017-07-04 NOTE — DIAGNOSTIC IMAGING REPORT
INDICATION:  Status post fall earlier in the day with hip pain



TECHNIQUE:  2 views of the right hip, 2:06 PM.



CORRELATION STUDY:  None



FINDINGS: Overall somewhat limited visualization, there does

appear to be foreshortening of the right femoral neck with what

appears to be likely a subcapital femoral neck fracture. Femoral

head acetabular relationship is maintained.  Remainder of right

hemipelvis intact.



IMPRESSION: 

1. Though somewhat limited visualization there does appear to

likely impacted right subcapital femoral neck fracture. If

further assessment desired, CT and/or MRI recommended.



Dictated by: 



  Dictated on workstation # [id number]

## 2017-07-04 NOTE — PROGRESS NOTE-PRE OPERATIVE
Pre-Operative Progress Note


H&P Reviewed


The H&P was reviewed, patient examined and no changes noted.


Date Seen by Provider:  Jul 4, 2017


Time Seen by Provider:  19:19


Date H&P Reviewed:  Jul 4, 2017


Time H&P Reviewed:  19:19


Pre-Operative Diagnosis:  displaced right femoral neck fracture











JOSE ALAMO Jul 4, 2017 7:19 pm

## 2017-07-04 NOTE — ED FALL/INJURY
General


Chief Complaint:  Trauma-Non Activation


Stated Complaint:  FALL/RT KNEE PAIN


Nursing Triage Note:  


PT ARRIVED PER EMS, PT HAD FALL AT HOME STATES HAS R HIP AND R KNEE PAIN, PT 


DENIES HITTING HEAD OR LOC, PT HAS SL IN PLACE R WRIST #20 BY EMS, PT WAS GIVEN 


[id number] BY EMS. PT VERY NON-SPECIFIC CO PAIN. PT LOWER EXT SWOLLEN, R KNEE 


SWOLLEN, PT STATES HAVE BEEN LIKE THIS FOR A WHILE


Source:  patient, EMS


Exam Limitations:  no limitations





History of Present Illness


Time seen by provider:  13:35


Initial Comments


This 82-year-old woman presents to the emergency room after having a fall at 

home.  She fell onto her right side for unknown reasons.  She denies any 

prodrome.  She denies any head or neck injury.  She has chronic and unchanged 

neck pain.  She also complains of lower extremity bilateral edema, right 

greater than left.  This edema fluctuates but is overall chronic in nature 

without much change.  She has recently had problems with constipation and 

diarrhea alternating.  She arrives via EMS.  Vital signs are reportedly stable.


Location Injury Occurred:  HOME


Occurred:  just prior to arrival


Context:  unknown


Loss of Consciousness:  no loss of consciousness





Allergies and Home Medications


Allergies


Coded Allergies:  


     nystatin (Verified  Allergy, Mild, HIVES, 17)


     amoxicillin (Unverified  Allergy, Unknown, 10/16/14)


     chlorhexidine (Verified  Allergy, Unknown, HIVES, 17)


     clindamycin (Unverified  Allergy, Unknown, 10/16/14)


     sulfamethoxazole (Unverified  Allergy, Unknown, 10/16/14)


     trimethoprim (Unverified  Allergy, Unknown, 10/16/14)





Home Medications


Aspirin 325 Mg Tablet.dr, 325 MG PO DAILY PRN for PAIN-MILD, (Reported)


Loratadine 10 Mg Tablet, 10 MG PO DAILY PRN for ALLERGIES, (Reported)





Constitutional:  no symptoms reported


Eyes:  No Symptoms Reported


Ears, Nose, Mouth, Throat:  no symptoms reported


Respiratory:  no symptoms reported


Cardiovascular:  edema


Gastrointestinal:  no symptoms reported


Genitourinary:  no symptoms reported


Musculoskeletal:  see HPI


Skin:  no symptoms reported


Psychiatric/Neurological:  No Symptoms Reported





Past Medical-Social-Family Hx


Patient Social History


Alcohol Use:  Denies Use


Recreational Drug Use:  No


Smoking Status:  Never a Smoker


Recent Foreign Travel:  No


Contact w/Someone Who Travel:  No


Recent Infectious Disease Expo:  No


Recent Hopitalizations:  No





Seasonal Allergies


Seasonal Allergies:  No





Surgeries


HX Surgeries:  Yes


Surgeries:  Adenoidectomy, Breast





Respiratory


Hx Respiratory Disorders:  Yes


Respiratory Disorders:  Pneumonia





Cardiovascular


Hx Cardiac Disorders:  Yes


Cardiac Disorders:  Chronic Edema/Swelling





Neurological


Hx Neurological Disorders:  No





Reproductive System


Pregnant:  No





Genitourinary


Hx Genitourinary Disorders:  No





Gastrointestinal


Hx Gastrointestinal Disorders:  Yes (anal fissure)


Gastrointestinal Disorders:  Hemorrhoids





Musculoskeletal


Hx Musculoskeletal Disorders:  Yes (chronic neck and back pain)


Musculoskeletal Disorders:  Chronic Back Pain





Endocrine


Hx Endocrine Disorders:  No





HEENT


HX ENT Disorders:  Yes (aerosols cause irritation)


Hearing Impairment:  Hard of Hearing





Cancer


Hx Cancer:  No





Psychosocial


Hx Psychiatric Problems:  No





Blood Transfusions


Hx Blood Disorders:  No





Family Medical History


Significant Family History:  No Pertinent Family Hx





Physical Exam


Vital Signs





Vital Sign - Last 12Hours








 17





 13:20


 


Temp 96.8


 


Pulse 100


 


Resp 11


 


B/P (MAP) 121/62


 


Pulse Ox 98





Capillary Refill : Less Than 3 Seconds


General Appearance:  WD/WN, no apparent distress


HEENT:  PERRL/EOMI, normal ENT inspection, pharynx normal


Neck:  non-tender, supple, normal inspection


Cardiovascular:  regular rate, rhythm, no edema, no murmur


Respiratory:  lungs clear, normal breath sounds, no respiratory distress, no 

accessory muscle use


Gastrointestinal:  normal bowel sounds, non tender, soft


Extremities:  pelvis stable, swelling (bilateral lower extremity pitting edema, 

right greater than left, stated as chronic with waxing and waning), other (

right hip mildly tender to palpation and mildly painful with external rotation.

  Mild tenderness to the right knee without significant pain on range of 

motion.  Mild tenderness to palpation of the right lateral ankle.)


Neurologic/Psychiatric:  CNs II-XII nml as tested (hard of hearing), no motor/

sensory deficits, alert, normal mood/affect, oriented x 3


Skin:  normal color, warm/dry





[place] Coma Score


Best Eye Response:  (4) Open Spontaneously


Best Verbal Response:  (5) Oriented


Best Motor Response:  (6) Obeys Commands


[place] Total:  15





Progress/Results/Core Measures


Results/Orders


Lab Results





Laboratory Tests








Test


  17


14:30 Range/Units


 


 


White Blood Count


  9.9 


  4.3-11.0


10^3/uL


 


Red Blood Count


  3.82 L


  4.35-5.85


10^6/uL


 


Hemoglobin 11.4 L 11.5-16.0  G/DL


 


Hematocrit 34 L 35-52  %


 


Mean Corpuscular Volume 89  80-99  FL


 


Mean Corpuscular Hemoglobin 30  25-34  PG


 


Mean Corpuscular Hemoglobin


Concent 33 


  32-36  G/DL


 


 


Red Cell Distribution Width 13.3  10.0-14.5  %


 


Platelet Count


  367 


  130-400


10^3/uL


 


Mean Platelet Volume 8.5  7.4-10.4  FL


 


Neutrophils (%) (Auto) 82 H 42-75  %


 


Lymphocytes (%) (Auto) 6 L 12-44  %


 


Monocytes (%) (Auto) 11  0-12  %


 


Eosinophils (%) (Auto) 0  0-10  %


 


Basophils (%) (Auto) 0  0-10  %


 


Neutrophils # (Auto) 8.2 H 1.8-7.8  X 10^3


 


Lymphocytes # (Auto) 0.6 L 1.0-4.0  X 10^3


 


Monocytes # (Auto) 1.1 H 0.0-1.0  X 10^3


 


Eosinophils # (Auto)


  0.0 


  0.0-0.3


10^3/uL


 


Basophils # (Auto)


  0.0 


  0.0-0.1


10^3/uL


 


Neutrophils % (Manual) 80   %


 


Lymphocytes % (Manual) 10   %


 


Monocytes % (Manual) 2   %


 


Eosinophils % (Manual) 0   %


 


Basophils % (Manual) 0   %


 


Band Neutrophils 8   %


 


Blood Morphology Comment NORMAL   


 


Prothrombin Time 16.2 H 12.2-14.7  SEC


 


INR Comment 1.3  0.8-1.4  


 


Activated Partial


Thromboplast Time 30 


  24-35  SEC


 


 


Urine Color YELLOW   


 


Urine Clarity CLEAR   


 


Urine pH 6  5-9  


 


Urine Specific Gravity 1.010 L 1.016-1.022  


 


Urine Protein NEGATIVE  NEGATIVE  


 


Urine Glucose (UA) NEGATIVE  NEGATIVE  


 


Urine Ketones NEGATIVE  NEGATIVE  


 


Urine Nitrite NEGATIVE  NEGATIVE  


 


Urine Bilirubin NEGATIVE  NEGATIVE  


 


Urine Urobilinogen NORMAL  NORMAL  MG/DL


 


Urine Leukocyte Esterase 1+ H NEGATIVE  


 


Urine RBC (Auto) NEGATIVE  NEGATIVE  


 


Urine RBC NONE   /HPF


 


Urine WBC NONE   /HPF


 


Urine Squamous Epithelial


Cells RARE 


   /HPF


 


 


Urine Crystals NONE   /LPF


 


Urine Bacteria NEGATIVE   /HPF


 


Urine Casts NONE   /LPF


 


Urine Mucus NEGATIVE   /LPF


 


Urine Culture Indicated NO   


 


Sodium Level 133 L 135-145  MMOL/L


 


Potassium Level 3.3 L 3.6-5.0  MMOL/L


 


Chloride Level 101    MMOL/L


 


Carbon Dioxide Level 26  21-32  MMOL/L


 


Anion Gap 6  5-14  MMOL/L


 


Blood Urea Nitrogen 19 H 7-18  MG/DL


 


Creatinine


  1.27 


  0.60-1.30


MG/DL


 


Estimat Glomerular Filtration


Rate 40 


   


 


 


BUN/Creatinine Ratio 15   


 


Glucose Level 158 H   MG/DL


 


Calcium Level 8.0 L 8.5-10.1  MG/DL


 


Total Bilirubin 0.5  0.1-1.0  MG/DL


 


Aspartate Amino Transf


(AST/SGOT) 20 


  5-34  U/L


 


 


Alanine Aminotransferase


(ALT/SGPT) 14 


  0-55  U/L


 


 


Alkaline Phosphatase 97    U/L


 


Total Protein 4.9 L 6.4-8.2  GM/DL


 


Albumin 2.1 L 3.2-4.5  GM/DL








My Orders





Orders - SYLVIE VARGAS MD


Knee, Right, 3 Views (17 13:35)


Ankle, Right, 3 Views (17 13:35)


Pelvis (17 13:35)


Hip, Right, 2 Views (17 13:35)


Cbc With Automated Diff (17 13:35)


Comprehensive Metabolic Panel (17 13:35)


Ua Culture If Indicated (17 13:35)


Saline Lock/Iv-Start (17 13:35)


Manual Differential (17 14:30)


Ct Pelvis Wo (17 14:45)


Ekg Tracing (17 15:28)


Protime With Inr (17 15:28)


Partial Thromboplastin Time (17 15:28)


Ns W/Kcl 20 Meq/L (Ns Iv W/Kcl 20 Meq/L) (17 16:15)





Vital Signs/I&O





Vital Sign - Last 12Hours








 17





 13:20


 


Temp 96.8


 


Pulse 100


 


Resp 11


 


B/P (MAP) 121/62


 


Pulse Ox 98














Blood Pressure Mean:  81








Progress Note :  


Progress Note


Patient was treated with fentanyl 25 g IV.  X-ray showed suspicion of femoral 

neck fracture.  Dr. Goodrich was contacted and recommended follow-up with CT 

scan.  CT of the pelvis confirmed the fracture.  Patient was admitted with 

intent for surgery this evening.  IV fluids with potassium were ordered.  

Patient also complained of right knee pain and right lateral ankle pain.  These 

areas were also imaged.





ECG


Initial ECG Impression Date:  2017


Initial ECG Impression Time:  15:38


Initial ECG Rate:  81


Initial ECG Rhythm:  Normal Sinus


Comment


Normal sinus rhythm with no ST elevation or depression.  No abnormal intervals 

or axis deviation.





Diagnostic Imaging





   Diagonstic Imaging:  Xray


   Plain Films/CT/US/NM/MRI:  pelvis


Comments


Pelvis x-ray viewed by me and report reviewed.  See report below:





NAME:      VIANEY CHAMBERLAIN


[facility] REC#:   [id number]


ACCOUNT#:   [id number]


PT STATUS:   ADM IN


:      1935


PHYSICIAN:    SYLVIE VARGAS MD


ADMIT DATE:   


***Signed***


Date of Exam:   17





PELVIS


 





INDICATION:  Status post fall with hip pain





TECHNIQUE:  AP pelvis  2:01 PM





CORRELATION STUDY:  


None





FINDINGS: 


As demonstrated on the accompanying right hip views, there may be


a impaction at the level of the right femoral neck. Assessment on


this study is very limited with rotation and positioning of the


femur. The femoral head acetabular relationship is maintained. 


Remainder of the pelvis as well as left hip  are maintained apart


from mild narrowing at the level of the hip. Advanced


degenerative change visualized lower lumbar spine.     





IMPRESSION: 


While not well-defined given the positioning of the femur,


overall findings are suspect for right subcapital femoral neck


fracture. Consideration might be given to CT and/or MRI for


confirmation.     





Dictated by: 





  Dictated on workstation # [id number]





[id number]





Dict:      17 1407


Trans:      17 0756





Interpreted by:         CELE ELMORE DO


Electronically signed by:   CELE ELMORE DO 17 0756








   Diagonstic Imaging:  Xray


   Plain Films/CT/US/NM/MRI:  knee


Comments


X-rays of the right knee reviewed by me and report reviewed.  See report below:





NAME:      VIANEY CHAMBERLAIN


[facility] REC#:   [id number]


ACCOUNT#:   [id number]


PT STATUS:   ADM IN


:      1935


PHYSICIAN:    SYLVIE VARGAS MD


ADMIT DATE:   


***Signed***


Date of Exam:   17





KNEE, RIGHT, 3 VIEWS





INDICATION:  Status post fall earlier in the day with leg pain





TECHNIQUE:  3 views of the right knee  





CORRELATION STUDY:  None





FINDINGS: 


Rather diffuse bony demineralization. There is narrowing of


medial and to a lesser degree lateral femoral compartment. There


is loss of normal smooth articular surface medially. Narrowing of


patellofemoral compartment. Osteophyte formation is pronounced


medially. Soft tissues are unremarkable.  There is presence of


vascular calcification.





IMPRESSION: 


1.  Negative for acute bony abnormality of the knee.  Advanced


multicompartment degenerative change particularly medially and


patellofemoral compartment. Bony demineralization.





Dictated by: 





  Dictated on workstation # [id number]





[id number]





Dict:      176


Trans:      17





Interpreted by:         CELE ELMORE DO


Electronically signed by:   CELE ELMORE DO 17








   Diagonstic Imaging:  Xray


   Plain Films/CT/US/NM/MRI:  hip


Comments


X-rays right hip viewed by me and report reviewed.  See report below:





NAME:      VIANEY CHAMBERLAIN


[facility] REC#:   [id number]


ACCOUNT#:   [id number]


PT STATUS:   ADM IN


:      1935


PHYSICIAN:    SYLVIE VARGAS MD


ADMIT DATE:   


***Signed***


Date of Exam:   17





HIP, RIGHT, 2 VIEWS


 





INDICATION:  Status post fall earlier in the day with hip pain





TECHNIQUE:  2 views of the right hip, 2:06 PM.





CORRELATION STUDY:  None





FINDINGS: Overall somewhat limited visualization, there does


appear to be foreshortening of the right femoral neck with what


appears to be likely a subcapital femoral neck fracture. Femoral


head acetabular relationship is maintained.  Remainder of right


hemipelvis intact.





IMPRESSION: 


1. Though somewhat limited visualization there does appear to


likely impacted right subcapital femoral neck fracture. If


further assessment desired, CT and/or MRI recommended.





Dictated by: 





  Dictated on workstation # [id number]





[id number]





Dict:      17 1404


Trans:      17





Interpreted by:         CELE ELMORE DO


Electronically signed by:   CELE ELMORE 








   Diagonstic Imaging:  Xray


   Plain Films/CT/US/NM/MRI:  ankle


Comments


X-ray of the right ankle viewed by me and report reviewed.  See report below:





NAME:      VIANEY CHAMBERLAIN


[facility] REC#:   [id number]


ACCOUNT#:   [id number]


PT STATUS:   ADM IN


:      1935


PHYSICIAN:    SYLVIE VARGAS MD


ADMIT DATE:   17/4TH


***Signed***


Date of Exam:   17





ANKLE, RIGHT, 3 VIEWS


 





INDICATION:  Status post fall earlier in the day, pain.





TECHNIQUE:  Three views of the right ankle  2:08 PM





CORRELATION STUDY:  None





FINDINGS: 


There is a rather pronounced diffuse bony demineralization.


Distal tibia and fibula intact with ankle mortise maintained.


Talar dome intact. Tiny calcaneal spur formation.  Soft tissue


swelling present.     





IMPRESSION: 


Negative for acute bony abnormality of the ankle.  Diffuse bony


demineralization. Soft tissue edema.





Dictated by: 





  Dictated on workstation # [id number]





[id number]





Dict:      17 1403


Trans:      17 0756





Interpreted by:         CELE ELMORE DO


Electronically signed by:   CELE ELMORE DO 17 0756








   Diagonstic Imaging:  CT


   Plain Films/CT/US/NM/MRI:  pelvis


Comments


NAME:      VIANEY CHAMBERLAIN


[facility] REC#:   [id number]


ACCOUNT#:   [id number]


PT STATUS:   REG ER


:      1935


PHYSICIAN:    SYLVIE VARGAS MD


ADMIT DATE:   17/ER


***Signed***


Date of Exam:   17





CT PELVIS WO


 





PROCEDURE: CT pelvis without contrast.





TECHNIQUE: Multiple contiguous axial images were obtained through


the pelvis without the use of intravenous contrast.  Sagittal and


coronal reformations were performed.





INDICATION: Fall. Right-sided pelvic pain.





FINDINGS:  


Examination shows a subcapital fracture of the right femur with


mild anterior angulation at the fracture site and very minimal


impaction. There is no dislocation. The intertrochanteric region


is intact. No fracture of the pelvic ring is seen.





IMPRESSION:


There is a mildly impacted angulated subcapital fracture of the


right hip.





Dictated by: 





  Dictated on workstation # [id number]





[id number]





Dict:      17 1509


Trans:      17 1514





Interpreted by:         MAGGIE PINEDA MD


Electronically signed by:   MAGGIE PINEDA MD 17 1514





Departure


Communication


Time/Spoke to Admitting Phy:  14:25


Communication


Dr. Goodrich





Impression


Impression:  


 Primary Impression:  


 Closed right hip fracture


 Qualified Codes:  S72.001A - Fracture of unspecified part of neck of right 

femur, initial encounter for closed fracture


 Additional Impressions:  


 Fall on same level


 Qualified Codes:  W18.30XA - Fall on same level, unspecified, initial encounter


 Hypokalemia


Disposition:   HOME, SELF-CARE


Condition:  Improved





Departure-Patient Inst.


Decision time for Depature:  14:20


Referrals:  


TY MENA MD (PCP/Family)


Primary Care Physician











SYLVIE VARGAS MD 2017 16:30

## 2017-07-04 NOTE — DIAGNOSTIC IMAGING REPORT
INDICATION:  Status post fall earlier in the day with leg pain



TECHNIQUE:  3 views of the right knee  



CORRELATION STUDY:  None



FINDINGS: 

Rather diffuse bony demineralization. There is narrowing of

medial and to a lesser degree lateral femoral compartment. There

is loss of normal smooth articular surface medially. Narrowing of

patellofemoral compartment. Osteophyte formation is pronounced

medially. Soft tissues are unremarkable.  There is presence of

vascular calcification.



IMPRESSION: 

1.  Negative for acute bony abnormality of the knee.  Advanced

multicompartment degenerative change particularly medially and

patellofemoral compartment. Bony demineralization.



Dictated by: 



  Dictated on workstation # [id number]

## 2017-07-04 NOTE — DIAGNOSTIC IMAGING REPORT
PROCEDURE: CT pelvis without contrast.



TECHNIQUE: Multiple contiguous axial images were obtained through

the pelvis without the use of intravenous contrast.  Sagittal and

coronal reformations were performed.



INDICATION: Fall. Right-sided pelvic pain.



FINDINGS:  

Examination shows a subcapital fracture of the right femur with

mild anterior angulation at the fracture site and very minimal

impaction. There is no dislocation. The intertrochanteric region

is intact. No fracture of the pelvic ring is seen.



IMPRESSION:

There is a mildly impacted angulated subcapital fracture of the

right hip.



Dictated by: 



  Dictated on workstation # [id number]

## 2017-07-04 NOTE — HISTORY & PHYSICAL-SURGICAL
HPO-Surgical


History of Present Illness


Chief Complaint:  


PT ARRIVED PER EMS, PT HAD FALL AT HOME STATES HAS R HIP AND R KNEE PAIN, PT 


DENIES HITTING HEAD OR LOC, PT HAS SL IN PLACE R WRIST #20 BY EMS, PT WAS GIVEN 


[id number] BY EMS. PT VERY NON-SPECIFIC CO PAIN. PT LOWER EXT SWOLLEN, R KNEE 


SWOLLEN, PT STATES HAVE BEEN LIKE THIS FOR A WHILE


Diagnosis/Surgical Indication:  displaced right femoral neck fracture


Procedure:  


right hip prosthesis


Date of Surgery:  Jul 4, 2017


Weight (Pounds):  126


Weight (Ounces):  0.0


Height (Feet):  5


Height (Inches):  3.00





Allergies and Home Medications


Allergies


Coded Allergies:  


     amoxicillin (Unverified  Allergy, Unknown, 10/16/14)


     clindamycin (Unverified  Allergy, Unknown, 10/16/14)


     sulfamethoxazole (Unverified  Allergy, Unknown, 10/16/14)


     trimethoprim (Unverified  Allergy, Unknown, 10/16/14)


Uncoded Allergies:  


     CHLORRHESILENE (Allergy, Unknown, 10/16/14)





Home Medications


Ciprofloxacin HCl 500 Mg Tablet, 500 MG PO BID for 7 Days, (Reported)


Ondansetron 8 Mg Tab.rapdis, 8 MG PO Q6H PRN for NAUSEA/VOMITING-1ST LINE, #10 

Ref 0


   Prescribed by: GRACE TANNER on 6/16/17 1540


Polyethylene Glycol 3350 17 Gm Powd.pack, 17 GM PO BID PRN for CONSTIPATION-1ST 

LINE, #20


   Prescribed by: SYLVIE MOORE on 6/7/17 1108





Past Medical-Social-Family Hx


Patient Social History


Alcohol Use:  Denies Use


Recreational Drug Use:  No


Smoking Status:  Never a Smoker


Physical Abuse Screen:  No


Sexual Abuse:  No


Recent Foreign Travel:  No


Contact w/other who traveled:  No


Recent Hopitalizations:  No


Recent Infectious Disease Expo:  No





Seasonal Allergies


Seasonal Allergies:  Yes





Surgeries


HX Surgeries:  Yes


Surgeries:  Adenoidectomy, Breast





Respiratory


Hx Respiratory Disorders:  Yes





Cardiovascular


Hx Cardiovascular Disorders:  Yes


Cardiac Disorders:  Chronic Edema/Swelling





Neurological


Hx Neurological Disorders:  No





Reproductive System


Pregnant:  No





Genitourinary


Hx Genitourinary Disorders:  No





Gastrointestinal


Hx Gastrointestinal Disorders:  Yes (anal fissure)


Gastrointestinal Disorders:  Hemorrhoids, Chronic Diarrhea





Musculoskeletal


Hx Musculoskeletal Disorders:  Yes (chronic neck and back pain)


Musculoskeletal Disorders:  Chronic Back Pain





Endocrine


Hx Endocrine Disorders:  No





HEENT


HX ENT Disorders:  Yes (aerosols cause irritation)


Hearing Impairment:  Hard of Hearing





Cancer


Hx Cancer:  No





Psychosocial


Hx Psychiatric Problems:  No





Blood Transfusions


Hx Blood Disorders:  No





Family Medical History


Significant Family History:  No Pertinent Family Hx





Exam


Vital Signs





Vital Signs








 7/4/17 7/4/17





 17:45 18:19


 


Temp 97.8 


 


Pulse 81 


 


Resp 18 


 


B/P (MAP) 128/58 


 


Pulse Ox  95


 


O2 Delivery  Room Air





Capillary Refill : Less Than 3 SecondsLess Than 3 Seconds


Labs





Laboratory Tests








Test


  7/4/17


14:30 Range/Units


 


 


White Blood Count


  9.9 


  4.3-11.0


10^3/uL


 


Red Blood Count


  3.82 L


  4.35-5.85


10^6/uL


 


Hemoglobin 11.4 L 11.5-16.0  G/DL


 


Hematocrit 34 L 35-52  %


 


Mean Corpuscular Volume 89  80-99  FL


 


Mean Corpuscular Hemoglobin 30  25-34  PG


 


Mean Corpuscular Hemoglobin


Concent 33 


  32-36  G/DL


 


 


Red Cell Distribution Width 13.3  10.0-14.5  %


 


Platelet Count


  367 


  130-400


10^3/uL


 


Mean Platelet Volume 8.5  7.4-10.4  FL


 


Neutrophils (%) (Auto) 82 H 42-75  %


 


Lymphocytes (%) (Auto) 6 L 12-44  %


 


Monocytes (%) (Auto) 11  0-12  %


 


Eosinophils (%) (Auto) 0  0-10  %


 


Basophils (%) (Auto) 0  0-10  %


 


Neutrophils # (Auto) 8.2 H 1.8-7.8  X 10^3


 


Lymphocytes # (Auto) 0.6 L 1.0-4.0  X 10^3


 


Monocytes # (Auto) 1.1 H 0.0-1.0  X 10^3


 


Eosinophils # (Auto)


  0.0 


  0.0-0.3


10^3/uL


 


Basophils # (Auto)


  0.0 


  0.0-0.1


10^3/uL


 


Neutrophils % (Manual) 80   %


 


Lymphocytes % (Manual) 10   %


 


Monocytes % (Manual) 2   %


 


Eosinophils % (Manual) 0   %


 


Basophils % (Manual) 0   %


 


Band Neutrophils 8   %


 


Blood Morphology Comment NORMAL   


 


Prothrombin Time 16.2 H 12.2-14.7  SEC


 


INR Comment 1.3  0.8-1.4  


 


Activated Partial


Thromboplast Time 30 


  24-35  SEC


 


 


Urine Color YELLOW   


 


Urine Clarity CLEAR   


 


Urine pH 6  5-9  


 


Urine Specific Gravity 1.010 L 1.016-1.022  


 


Urine Protein NEGATIVE  NEGATIVE  


 


Urine Glucose (UA) NEGATIVE  NEGATIVE  


 


Urine Ketones NEGATIVE  NEGATIVE  


 


Urine Nitrite NEGATIVE  NEGATIVE  


 


Urine Bilirubin NEGATIVE  NEGATIVE  


 


Urine Urobilinogen NORMAL  NORMAL  MG/DL


 


Urine Leukocyte Esterase 1+ H NEGATIVE  


 


Urine RBC (Auto) NEGATIVE  NEGATIVE  


 


Urine RBC NONE   /HPF


 


Urine WBC NONE   /HPF


 


Urine Squamous Epithelial


Cells RARE 


   /HPF


 


 


Urine Crystals NONE   /LPF


 


Urine Bacteria NEGATIVE   /HPF


 


Urine Casts NONE   /LPF


 


Urine Mucus NEGATIVE   /LPF


 


Urine Culture Indicated NO   


 


Sodium Level 133 L 135-145  MMOL/L


 


Potassium Level 3.3 L 3.6-5.0  MMOL/L


 


Chloride Level 101    MMOL/L


 


Carbon Dioxide Level 26  21-32  MMOL/L


 


Anion Gap 6  5-14  MMOL/L


 


Blood Urea Nitrogen 19 H 7-18  MG/DL


 


Creatinine


  1.27 


  0.60-1.30


MG/DL


 


Estimat Glomerular Filtration


Rate 40 


   


 


 


BUN/Creatinine Ratio 15   


 


Glucose Level 158 H   MG/DL


 


Calcium Level 8.0 L 8.5-10.1  MG/DL


 


Total Bilirubin 0.5  0.1-1.0  MG/DL


 


Aspartate Amino Transf


(AST/SGOT) 20 


  5-34  U/L


 


 


Alanine Aminotransferase


(ALT/SGPT) 14 


  0-55  U/L


 


 


Alkaline Phosphatase 97    U/L


 


Total Protein 4.9 L 6.4-8.2  GM/DL


 


Albumin 2.1 L 3.2-4.5  GM/DL








General Appearance:  Alert, Oriented X3, Cooperative


HEENT:  Atraumatic, PERRLA


Respiratory:  Clear to Auscultation


Cardiovascular:  Regular Rate


Abdominal:  Soft, No Tenderness


Extremities:  No Clubbing, No Cyanosis, Normal Pulses, No Tenderness/Swelling (

tenderness to right hip, right leg severely shortened and externally rotated)


Skin:  No Rashes, No Breakdown


Neuro:  Normal Speech


Psych/Mental Status:  Mental Status NL





Assessment/Plan


Assessment and Plan


A: displaced right femoral neck fracture


fall





P: right hip prosthesis


Problems:  











JOSE ALAMO APRTARA Jul 4, 2017 7:21 pm

## 2017-07-04 NOTE — PROGRESS NOTE-POST OPERATIVE
Post-Operative Progess Note


Surgeon (s)/Assistant (s)


Surgeon


XENIA ISLAS DO


Assistant:  Andrade Hi NPVELIA





Pre-Operative Diagnosis


displaced right femoral neck fracture





Post-Operative Diagnosis





1. Same 2. chronic Avulsion Abductor tendons right hip





Procedure & Operative Findings


Date of Procedure


7/4/17


Procedure Performed/Findings


Bipolar femoral hemiarthroplasty right hip  Repair abductor tendon right hip   

    findings:  complete displaced right femoral neck fx,  chronic avulsion 

gluteus medius tendon right hip


Anesthesia Type


General  spinal anesthesia attempted initially without success





Estimated Blood Loss


Estimated blood loss (mL):  150 ml





Specimens/Packing


Specimens Removed


right femoral head  not sent to pathology


Packing:  


none











XENIA ISLAS DO Jul 4, 2017 9:45 pm

## 2017-07-04 NOTE — DIAGNOSTIC IMAGING REPORT
INDICATION:  Status post fall earlier in the day, pain.



TECHNIQUE:  Three views of the right ankle  2:08 PM



CORRELATION STUDY:  None



FINDINGS: 

There is a rather pronounced diffuse bony demineralization.

Distal tibia and fibula intact with ankle mortise maintained.

Talar dome intact. Tiny calcaneal spur formation.  Soft tissue

swelling present.     



IMPRESSION: 

Negative for acute bony abnormality of the ankle.  Diffuse bony

demineralization. Soft tissue edema.



Dictated by: 



  Dictated on workstation # [id number]

## 2017-07-05 VITALS — DIASTOLIC BLOOD PRESSURE: 63 MMHG | SYSTOLIC BLOOD PRESSURE: 112 MMHG

## 2017-07-05 VITALS — DIASTOLIC BLOOD PRESSURE: 61 MMHG | SYSTOLIC BLOOD PRESSURE: 102 MMHG

## 2017-07-05 VITALS — SYSTOLIC BLOOD PRESSURE: 98 MMHG | DIASTOLIC BLOOD PRESSURE: 50 MMHG

## 2017-07-05 VITALS — SYSTOLIC BLOOD PRESSURE: 112 MMHG | DIASTOLIC BLOOD PRESSURE: 53 MMHG

## 2017-07-05 VITALS — SYSTOLIC BLOOD PRESSURE: 116 MMHG | DIASTOLIC BLOOD PRESSURE: 57 MMHG

## 2017-07-05 LAB
ANION GAP SERPL CALC-SCNC: 10 MMOL/L (ref 5–14)
BUN SERPL-MCNC: 14 MG/DL (ref 7–18)
BUN/CREAT SERPL: 16
CALCIUM SERPL-MCNC: 7.5 MG/DL (ref 8.5–10.1)
CHLORIDE SERPL-SCNC: 108 MMOL/L (ref 98–107)
CO2 SERPL-SCNC: 18 MMOL/L (ref 21–32)
CREAT SERPL-MCNC: 0.9 MG/DL (ref 0.6–1.3)
ERYTHROCYTE [DISTWIDTH] IN BLOOD BY AUTOMATED COUNT: 13.4 % (ref 10–14.5)
GFR SERPLBLD BASED ON 1.73 SQ M-ARVRAT: 60 ML/MIN
GLUCOSE SERPL-MCNC: 116 MG/DL (ref 70–105)
INR PPP: 1.3 (ref 0.8–1.4)
MCH RBC QN AUTO: 31 PG (ref 25–34)
MCHC RBC AUTO-ENTMCNC: 34 G/DL (ref 32–36)
MCV RBC AUTO: 90 FL (ref 80–99)
PLATELET # BLD: 284 10^3/UL (ref 130–400)
PMV BLD AUTO: 8.9 FL (ref 7.4–10.4)
POTASSIUM SERPL-SCNC: 3.2 MMOL/L (ref 3.6–5)
PROTHROMBIN TIME: 16.2 SEC (ref 12.2–14.7)
RBC # BLD AUTO: 3.87 10^6/UL (ref 4.35–5.85)
SODIUM SERPL-SCNC: 136 MMOL/L (ref 135–145)
WBC # BLD AUTO: 9.2 10^3/UL (ref 4.3–11)

## 2017-07-05 RX ADMIN — DEXTROSE MONOHYDRATE, SODIUM CHLORIDE, AND POTASSIUM CHLORIDE SCH MLS/HR: 50; 4.5; 1.49 INJECTION, SOLUTION INTRAVENOUS at 08:12

## 2017-07-05 RX ADMIN — LACTOBACILLUS TAB SCH TAB.CHEW: TAB at 11:23

## 2017-07-05 RX ADMIN — SODIUM CHLORIDE SCH MLS/HR: 900 INJECTION, SOLUTION INTRAVENOUS at 18:43

## 2017-07-05 RX ADMIN — DEXTROSE MONOHYDRATE, SODIUM CHLORIDE, AND POTASSIUM CHLORIDE SCH MLS/HR: 50; 4.5; 1.49 INJECTION, SOLUTION INTRAVENOUS at 22:07

## 2017-07-05 RX ADMIN — DOCUSATE SODIUM AND SENNOSIDES SCH EA: 8.6; 5 TABLET, FILM COATED ORAL at 21:26

## 2017-07-05 RX ADMIN — LACTOBACILLUS TAB SCH TAB.CHEW: TAB at 16:20

## 2017-07-05 RX ADMIN — POTASSIUM CHLORIDE SCH MEQ: 1500 TABLET, EXTENDED RELEASE ORAL at 09:13

## 2017-07-05 RX ADMIN — ENOXAPARIN SODIUM SCH MG: 100 INJECTION SUBCUTANEOUS at 09:13

## 2017-07-05 RX ADMIN — ASPIRIN SCH MG: 325 TABLET, COATED ORAL at 09:13

## 2017-07-05 RX ADMIN — SODIUM CHLORIDE SCH MLS/HR: 900 INJECTION, SOLUTION INTRAVENOUS at 06:15

## 2017-07-05 NOTE — CONSULTATION-HOSPITALIST
HPI


History of Present Illness:


HPI/Chief Complaint


CC: Medical management following hip fracture





HPI: This is a 82 yoWF pt of Dr. NOÉ Dean presented after fall at home. She 

sustained a right hip fracture that was repaired in an uncomplicated manner by 

Dr. Goodrich last evening and currently there is a pt eval for in-pt rehab.





RN Review:


Pt states that she has had diarrhea for two weeks. Pt has recently been on Cipro


Stool sample was collected.


K+ low, 20 K+ was added to IV and 20 PO as well


Pt is hesitant to take pain meds, like hydros. Pt states that Tylenol does not 

generally help





SW Review:


Pt and her daughters are very nervous in the medical scenario currently


Rehab meets qualification for diagnosis, but if not [facility]


Pt taking Flagyl from [facility] in ED for negative stool w/u





Patient Interview:


Pt's daughters state that pt does not see Dr. Dean, but is considering seeing 

her as a PCP.


Pt lives at home alone


Pt was talking about her ankle brace with PT upon interview


Pt confirms Dr. Dean as PCP, but has not seen him yet.


Pt states that she was experiencing constipation, then diarrhea.


Pt's daughters state that she had a UTI previously


Physical exam stable. Lungs sound perfect.


Pt states that she does not like yogurt but will eat it.


In pt rehab was discussed


Pt's daughters asked the difference between Culturell or yogurt. They were 

informed that they are similar


I informed her that I will see her tomorrow, and she seemed pleased with this.





Scribed by Roxana Estevez under the direct supervision of Dr. Martinez.


Source:  patient


Exam Limitations:  no limitations


Date Seen


17


Attending Physician


Silvino Goodrich DO


PCP


Wiblert Dean MD


Referring Physician





Date of Admission


2017 at 16:25





Home Medications & Allergies


Home Medications


Reviewed patient Home Medication Reconciliation Form





Allergies





Allergies


Coded Allergies


  nystatin (Verified Allergy, Mild, HIVES, 17)


  amoxicillin (Unverified Allergy, Unknown, 10/16/14)


  chlorhexidine (Verified Allergy, Unknown, HIVES, 17)


  clindamycin (Unverified Allergy, Unknown, 10/16/14)


  sulfamethoxazole (Unverified Allergy, Unknown, 10/16/14)


  trimethoprim (Unverified Allergy, Unknown, 10/16/14)








Past Medical-Social-Family Hx


Patient Social History


Marrital Status:  


Employed/Student:  retired


Alcohol Use:  Denies Use


Recreational Drug Use:  No


Smoking Status:  Never a Smoker


Physical Abuse Screen:  No


Sexual Abuse:  No


Recent Foreign Travel:  No


Contact w/other who traveled:  No


Recent Hopitalizations:  No


Recent Infectious Disease Expo:  No





Seasonal Allergies


Seasonal Allergies:  Yes





Surgeries


HX Surgeries:  Yes


Surgeries:  Adenoidectomy, Breast





Respiratory


Hx Respiratory Disorders:  Yes


Respiratory Disorders:  Asthma





Cardiovascular


Hx Cardiovascular Disorders:  Yes


Cardiac Disorders:  Chronic Edema/Swelling





Neurological


Hx Neurological Disorders:  No





Reproductive System


Pregnant:  No





Genitourinary


Hx Genitourinary Disorders:  No





Gastrointestinal


Hx Gastrointestinal Disorders:  Yes (anal fissure)


Gastrointestinal Disorders:  Hemorrhoids, Chronic Diarrhea





Musculoskeletal


Hx Musculoskeletal Disorders:  Yes (chronic neck and back pain)


Musculoskeletal Disorders:  Chronic Back Pain





Endocrine


Hx Endocrine Disorders:  No





HEENT


HX ENT Disorders:  Yes (aerosols cause irritation)


Hearing Impairment:  Hard of Hearing





Cancer


Hx Cancer:  No





Psychosocial


Hx Psychiatric Problems:  No





Blood Transfusions


Hx Blood Disorders:  No





Family Medical History


Significant Family History:  No Pertinent Family Hx





Review of Systems


Constitutional:  weakness


EENTM:  no symptoms reported


Respiratory:  no symptoms reported


Cardiovascular:  no symptoms reported


Gastrointestinal:  diarrhea, loss of appetite, nausea


Genitourinary:  no symptoms reported


Musculoskeletal:  joint pain


Skin:  no symptoms reported


Psychiatric/Neurological:  Anxiety


All Other Systems Reviewed


Negative Unless Noted:  Yes





Physical Exam


Physical Exam


Vital Signs





Vital Sign - Last 12Hours








 17





 13:20 17:45 22:55


 


Temp 96.8  


 


Pulse 100  


 


Resp 11  


 


B/P (MAP) 121/62  


 


Pulse Ox 98  


 


O2 Delivery  Room Air 


 


O2 Flow Rate   2.00





Capillary Refill : Less Than 3 SecondsLess Than 3 Seconds


General Appearance:  No Apparent Distress, WD/WN, Chronically ill


Eyes:  Bilateral Eye Normal Inspection, Bilateral Eye PERRL


HEENT:  PERRL/EOMI, Normal ENT Inspection, Pharynx Normal


Neck:  Full Range of Motion, Normal Inspection, Non Tender, Supple, Carotid 

Bruit


Respiratory:  Chest Non Tender, Lungs Clear, Normal Breath Sounds, No Accessory 

Muscle Use, No Respiratory Distress


Cardiovascular:  Regular Rate, Rhythm, No Edema, No Gallop, No JVD, No Murmur, 

Normal Peripheral Pulses


Gastrointestinal:  Normal Bowel Sounds, No Organomegaly, No Pulsatile Mass, Non 

Tender, Soft


Back:  Normal Inspection, No CVA Tenderness, No Vertebral Tenderness


Extremity:  Normal Capillary Refill, Normal Inspection, Normal Range of Motion, 

Non Tender, No Calf Tenderness, Swelling (bilateral legs)


Neurologic/Psychiatric:  Alert, Oriented x3, No Motor/Sensory Deficits, Normal 

Mood/Affect


Skin:  Normal Color, Warm/Dry


Lymphatic:  No Adenopathy





Results


Results/Procedures


Lab


Laboratory Tests


17 14:30








17 04:38














Assessment/Plan


Admission Diagnosis


Assessment:


Right hip fracture POD # 1 uncomplicated


Chronic diarrhea C. diff negative started on Lactinex and Questran


Hypokalemia


Chronic edema





Assessment and Plan


Plan:


Lactinex empirically


Rehab eval


Continue yogurt fir active cultures


Questran QID prn


PT/OT


DVT Px





Clinical Quality Measures


DVT/VTE Risk/Contraindication:


Risk Factor Score Per Nursin


RFS Level Per Nursing on Admit:  4+=Very High











RUTH ANN MARTINEZ DO 2017 10:59

## 2017-07-05 NOTE — OPERATIVE REPORT
PROCEDURE PHYSICIAN:   XENIA GOODRICH

 

DATE OF PROCEDURE:  07/04/2017

 

DICTATING PHYSICIAN:  

Xenia Goodrich DO 

 

PREOPERATIVE DIAGNOSIS:

Displaced femoral neck fracture, right hip. 

 

POSTOPERATIVE DIAGNOSIS:

1.   Displaced femoral neck fracture, right hip. 

2.   Chronic avulsion abductor tendons (gluteus medius tendon)

right hip. 

 

PROCEDURE:

1.   Bipolar femoral hemiarthroplasty, right hip. 

2.   Repair chronic gluteus medius tendon avulsion, right hip. 

 

SURGEON:

Dr. Goodrich 

 

FIRST ASSISTANT:  

VALERIE Rizo  

 

SURGICAL FIRST ASSISTANT: 

Andrade Hi surgical first assistant was utilized throughout the

entire procedure for patient positioning, retraction of soft

tissues, placement of metallic implants, wound closure, dressing

application and patient transfer. 

 

ANESTHESIA:

General (attempted spinal block anesthesia without success). 

 

BLOOD LOSS:  

150 mL. 

 

INDICATIONS AND FINDINGS: 

The patient is an 82-year-old female who was at home.  She was

complaining of instability of her right knee and her right ankle.

She has had previous arthroscopic surgery performed on the right

knee. She has had chronic tendon tears in her ankle with a

persistent edema in her right leg. She was walking with a walker.

She states that her knee began to give way.  She attempted to

grab for the counter top and grabbed a chair and fell over and

landed directly on her right hip and noted immediate pain. X-rays

revealed a displaced femoral neck fracture of the right hip. The

patient was taken to surgery where at the time of arthrotomy, the

patient demonstrated 100% displaced femoral neck fracture on the

right. The patient also demonstrated evidence of chronic avulsion

of her gluteus medius tendon. The very small portion of the

posterior aspect of the tendon remained intact.  The greater

trochanter demonstrated only minor small 3 to 4 mm x 2 cm strands

remaining attached to the greater trochanter. The hip was

repaired utilizing the Biomet hip system with a press-fit 12 mm

Taperloc complete primary femoral porous coated stem with a

reduced distal taper. The patient's head was replaced with a 46

mm outside diameter RingLoc bipolar acetabular cup along with a

-6 mm Biomet modular head component. The abductor tendon was

repaired as well. 

 

PROCEDURE IN DETAIL:

The patient was seen by anesthesia preoperatively.  Attempts at a

spinal anesthesia were performed without success. A general

inhalation anesthetic was administered. Initially with an LMA

with an incomplete seal; therefore, a general inhalation

anesthesia with an endotracheal tube was performed. 

 

The patient was then placed in a left lateral hip decubitus

position, secured the operating table with the pegboard system. A

Betadine prep and drape in the usual sterile fashion of the right

hip and right lower extremity was performed. A lateral

longitudinal incision was then made centered over the greater

trochanter with incision deepened through the iliotibial band.

The patient demonstrated the avulsion of her abductor tendon

noted. Electrocautery was used to create a modified Aly

approach with division of the vastus lateralis fascia, a very a

small amount the gluteus medius tendon remaining posteriorly. The

capsule was reflected off the femoral neck and blood was exposed.

Additional capsular dissection was performed allowing external

rotation and flexion of the hip exposing the complete neck

fracture. An osteotomy was completed at the femoral neck femoral

neck. The femoral head was harvested with a corkscrew awl and the

femoral head measured 46.5 mm in outside diameter. 

 

Portions of the superior acetabular labrum were excised. The

proximal femur was opened with a box chisel and broached up to a

12 mm stem. The calcar was smoothed. The provisional components

were inserted. The patient's hip was noted to be stable with a -6

mm provisional head. 

 

The provisional components were removed. The hip was irrigated

extensively with normal saline solution. The 12 mm Taperloc stem

was then impacted with bone graft from the femoral head used to

further graft the anterior aspect of the femoral neck with

satisfactory seating of the implant without complications. 

 

This -6 mm head and bipolar components were then cold welded on

the stem. The hip was reduced. 

 

The hip was taken through a range of motion and found to be

stable. 

 

Curette was then used to debride the avulsion site of the gluteus

medius tendon.  Multiple drill holes were placed through the

gluteus medius tendon (4).  Number 2 Tycron was then used to

repair the gluteus medius tendon back to the greater trochanter.

The iliotibial band was closed with interrupted figure-of-eight

sutures of number 1 Tycron in a running suture was used to

complete the repair. 

 

The iliotibial band was then closed with a running suture of

number 1 Vicryl. The subcutaneous tissues were closed with 0 and

2-0 Vicryl suture. The skin was closed with stainless steel

staples. An Adaptic Neosporin bulky dressing was placed about the

left hip. The patient was awakened and was transported postop

recovery with anesthesia personnel present in satisfactory

condition. 

 

 

 

Job ID: [id number]

Dictated Date: 07/04/2017 21:53:16 

Transcription Date: 07/05/2017 08:01:53 / wil

## 2017-07-05 NOTE — ANESTHESIA-GENERAL POST-OP
General


Patient Condition


Mental Status/LOC:  Same as Preop


Cardiovascular:  Satisfactory


Nausea/Vomiting:  Absent


Respiratory:  Satisfactory


Pain:  Controlled


Complications:  Absent





Post Op Complications


Complications


None





Follow Up Care/Instructions


Patient Instructions


None needed.





Anesthesia/Patient Condition


Patient Condition


Patient is doing well, no complaints, stable vital signs, no apparent adverse 

anesthesia problems.   


No complications reported per nursing.











MARIA GUADALUPE ALMANZA CRNA Jul 5, 2017 10:14

## 2017-07-05 NOTE — DIAGNOSTIC IMAGING REPORT
INDICATION: 

Right hip pain.



FINDINGS:

Two views of the pelvis show postop changes from right femoral

head replacement. The prosthesis is in good position. The

alignment is normal. There is some gas in the adjacent soft

tissues.



IMPRESSION: 

Postop changes from right hip arthroplasty. No acute abnormality

is seen.



Dictated by: 



  Dictated on workstation # [id number]

## 2017-07-05 NOTE — OCCUPATIONAL THERAPY EVAL
OT Evaluation-General/PLF


Medical Diagnosis


Admission Date


Jul 4, 2017 at 16:25


Medical Diagnosis:  right DUKE


Onset Date:  Jul 4, 2017





Therapy Diagnosis


Therapy Diagnosis:  Decreased ADL skills





Height/Weight


Height (Feet):  5


Height (Inches):  3.00


Weight (Pounds):  126


Weight (Ounces):  0.0





Precautions


Precautions/Isolations:  Fall Prevention, Standard Precautions


Safety Interventions:  None





Weight Bear Status


Weight Bearing Restriction:  Weight Bearing/Tolerated


Location Restriction:  R LE


Pt. has ankle orthosis that is 3 years old from previous tendon injury.  She 

wears this everyday.





Referral


Physician:  Andrade Hi


Referral Reason:  Activity Tolerance, Self Care, Evaluation/Treatment, 

Strengthening/ROM





Medical History


Pertinent Medical History:  Arthritis


Additional Medical History


chronic back pain, chronic diarrhea, knee scope, right ankle tendon injury.


Current History


Pt. fell at home.  Has two daughters in room.  One lives in [place], and one 

does not.


Reviewed History:  Yes





Social History


Home:  [facility]


Current Living Status:  Alone


Entry Into Home:  Stairs With Railing


 Steps Into Home:  8 (Daughters state that pt. has deep and shallow steps.)





ADL-Prior Level of Function


ADL PLOF Comments


Pt. states that she is independent with daily living skills.  Still drives.  

States that she uses the walker only to assist with getting out of shower.  

Walks without equipment in home.  Uses cane when she leaves the home.


DME/Equipment:  Shower


DME/Equipment Comments


Pt. has a walker and cane.





OT Current Status


Subjective


Pt. does not report pain, but indicates that her right LE hurts whenever this 

OT touches it.





Appearance


Pt. is sitting in hip chair.  Agrees to transfer to bed.





Mental Status/Objective


Comprehension:  4 (Pt. is very [place].)





Current


Hand Dominance:  Right


Upper Extremity ROM


Pt. demonstrates approximately 100 degrees bilateral AROM in shoulder flexion.


Upper Extremity Strength


Right UE- 3/5 distally


Left UE- 3+/5 distally





ADL-Treatment


              Functional [place] Measure


0=Not Assessed/NA   4=Minimal Assistance


1=Total Assistance   5=Supervision or Setup


2=Maximal Assistance   6=Modified [place]


3=Moderate Assistance   7=Complete IndependenceIRFPAI Quality Coding Scale











6 Independent with activity with or without an assistive device


 


5  Patient requires set up or clean up by helper.  Patient completes activity  

by  themselves


 


4 Supervision or touching assist (CGA). [place] provide cues , steadying assist


 


3 The helper provides less than half the effort to complete the activity


 


2 The helper provides more than half the effort to complete the activity


 


1 Dependent.  The helper does all the effort to complete an activity 


 


7 Patient refused to complete or attempt activity


 


9 The patient did not perform the activity before the current illness or injury


 


88 Not attempted due to Medical conditions or safety concerns








Eating (FIM):  5 (set up.  Pt. feeding self yogurt when OT entered room.)


Lower Body Dressing (FIM):  1 (Pt. has hip precautions.  Unable to bend/cross 

legs.  Pt. unable to lift right LE to assist with OT putting socks/shoes on.)


Transfers (B, C, W/C) (FIM):  2 (Min assist for sit-stand.  Increased time 

needed.  Scooted feet to pivot toward bed.  Max for stand-sit, as pt. "falls" 

back with guidance of gait belt.  Max sit-supine.  Dependent bed mobility.)





Other Treatments


Spoke with pt. and daughters regarding discharge needs.  They are all agreeable 

that pt. would benefit from inpt. acute rehab to continue increased 

strengthening for daily tasks.





Education


OT Patient Education:  Modified ADL techniques, Progress toward Goal/Update tx 

plan, Purpose of tx/functional activities, Reviewed precautions, Rehab process, 

Transfer techniques


Teaching Recipient:  Patient


Teaching Methods:  Demonstration, Discussion


Response to Teaching:  Verbalize Understanding, Return Demonstration





OT Short Term Goals


Short Term Goals


Time Frame:  Jul 12, 2017


Eating(FIM):  6


Grooming(FIM):  5


Bathing(FIM):  4


Upper Body Dressing(FIM):  5


Lower Body Dressing(FIM):  4


Toileting(FIM):  5


Transfers (B,C,W/C) (FIM):  4


Toilet/Commode Transfer(FIM):  4


Shower Transfer(FIM):  4


Additional Short Term Goals:  1-Demonstrate ADL Tasks, 2-Verbalize Understanding

, 3-ImproveStrength/Ashley


1=Demonstrate adherence to instructed precautions during ADL tasks.


2=Patient will verbalize/demonstrate understanding of assistive devices/

modifications for ADL.


3=Patient will improve strength/tolerance for activity to enable patient to 

perform ADL's.





OT Long Term Goals


Long Term Goals


Time Frame:  Jul 26, 2017


Eating (FIM):  6


Grooming(FIM):  6


Bathing(FIM):  5


Upper Body Dressing(FIM):  5


Lower Body Dressing(FIM):  5


Toileting(FIM):  6


Transfers (B,C,W/C) (FIM):  5


Toilet/Commode Transfer(FIM):  6


Shower Transfer(FIM):  5


Additional Goals:  1-Demonstrate ADL Tasks, 2-Verbalize Understanding, 3-

ImproveStrength/Ashley


1=Demonstrate adherence to instructed precautions during ADL tasks.


2=Patient will verbalize/demonstrate understanding of assistive devices/

modifications for ADL.


3=Patient will improve strength/tolerance for activity to enable patient to 

perform ADL's.





OT Education/Plan


Problem List/Assessment


Assessment:  Decreased Activ Tolerance, Decreased UE Strength, Dependent 

Transfers, Impaired Bed Mobility, Impaired Funct Balance, Impaired I ADL's, 

Impaired Self-Care Skills, Restricted Funct UE ROM





Discharge Recommendations


Plan/Recommendations:  Continue POC


Therapy D/C Recommendations:  Acute Rehab


Equpiment Recommendations-D/C:  Bath Chair, Hip Kit


Target Placement


Pt. would benefit from acute rehab stay.





Treatment Plan/Plan of Care


Treatment,Training & Education:  Yes


Patient would benefit from OT for education, treatment and training to promote 

independence in ADL's, mobility, safety and/or upper extremity function for ADL'

s.


Plan of Care:  ADL Retraining, Caregiver Training, Functional Mobility, UE 

Funct Exercise/Act


Treatment Duration:  Jul 26, 2017


Visits Per Week:  5


Rehab Potential:  Good





Time/GCodes


Start Time:  10:15


Stop Time:  11:10


Total Time Billed (hr/min):  55


Billed Treatment Time


1, EVM x 15minutes, ADL x 40minutes











MEREDITH GARSIA OT Jul 5, 2017 13:32

## 2017-07-05 NOTE — PHYSICAL THERAPY EVALUATION
PT Evaluation-General


Medical Diagnosis


Admission Date


Jul 4, 2017 at 16:25


Medical Diagnosis:  right DUKE


Onset Date:  Jul 4, 2017





Therapy Diagnosis


Therapy Diagnosis:  impaired mobility, strength, endurance





Height/Weight


Height (Feet):  5


Height (Inches):  3.00


Weight (Pounds):  126


Weight (Ounces):  0.0





Precautions


Precautions/Isolations:  Fall Prevention, Standard Precautions, Pressure Ulcer





Weight Bear Status


Weight Bearing Restriction:  Weight Bearing/Tolerated


Location Restriction:  R LE





Referral


Physician:  Andrade Hi


Reason for Referral:  Evaluation/Treatment





Medical History


Additional Medical History


chronic edema/swelling, anal fissure, hemorrhoids, chronic diarrhea, chronic 

neck and back pain, [place], surg (adenoidectomy, breast)


Current History


fell at home, came to hospital via EMS, had displaced right femoral neck fx





Social History


Home:  Multilevel


Current Living Status:  Alone


Entry Into Home:  Stairs With Railing


PT Steps Into Home:  4





Prior/Core FIM


Prior Level of Function


              Functional [place] Measure


0=Not Assessed/NA   4=Minimal Assistance


1=Total Assistance   5=Supervision or Setup


2=Maximal Assistance   6=Modified [place]


3=Moderate Assistance   7=Complete [place]


Bed Mobility:  6


Transfers (B,C,W/C) (FIM):  6


Gait:  6


Patient states she would use a rolling walker in the morning sometimes





PT Evaluation-Current


Subjective


Patient on commode pre tx, agrees to PT, will assist nursing to stand and get 

cleaned up and then attempt to ambulate.  Patient states she has pain of 2-3/10 

in right hip.





Pt/Family Goals


to be independent at home





Objective


Patient Orientation:  Person, Place, Situation


Attachments:  Oxygen, Cevallos Catheter, IV





ROM/Strength


ROM Lower Extremities


WNL left lower extremity, right NT due to pain


Strenght Lower Extremities


right LE 2/10 gross, left LE 3+/5 gross





Integumentary/Posture


Bladder Incontinence:  Cevallos Cath





Neuromuscular


(Tone, Coordination, Reflexes)


WNL





Sensory


Vision:  Functional


Hearing:  Impaired


Sensation Right Lower Extremit:  Intact


Sensation Left Lower Extremity:  Intact





Transfers


              Functional [place] Measure


0=Not Assessed/NA   4=Minimal Assistance


1=Total Assistance   5=Supervision or Setup


2=Maximal Assistance   6=Modified [place]


3=Moderate Assistance   7=Complete [place]


Transfers (B, C, W/C) (FIM):  2


Sit to/from Stand:  2





Gait


Mode of Locomotion:  Walk


Anticipated Mode of Locomotion:  Walk


Gait (FIM):  1


Distance:  2'


Gait Level of Assist:  3


Gait Persons Needed:  1


Gait Assistive Device:  FWW


Comments/Gait Description


Patient was able to turn and take a few steps to the chair





Balance


Sitting Static:  Normal


Sitting Dynamic:  Normal


Standing Static:  Poor


 Standing Dynamic:  Poor





Treatment


seated exercises x10, AP, LAQ, hip abd





Assessment/Needs


Patient has impaired mobility, strength, endurance post right hip DUKE


Rehab Potential:  Fair





PT Long Term Goals


Long Term Goals


PT Long Term Goals Time Frame:  Jul 12, 2017


Transfers (B,C,W/C) (FIM):  4


Gait (FIM):  2


Distance:  50'


Gait Level of Assist:  4


Gait Assistive Device:  FWW





PT Plan


Problem List


Problem List:  Activity Tolerance, Functional Strength, Safety, Balance, Gait, 

Transfer, Bed Mobility, ROM





Treatment/Plan


Treatment Plan:  Continue Plan of Care


Treatment Plan:  Bed Mobility, Education, Functional Activity [place], Functional 

Strength, Gait, Safety, Therapeutic Exercise, Transfers


Treatment Duration:  Jul 12, 2017


# of days/week


5-6


Visits Per Week:  10-11


Minutes/Day (M-F):  15-30


Minutes/Day (Sat/[place]aker):  15-30


Pt/Family Agrees w/Plan:  Yes





Safety Risks/Education


Patient Education:  Gait Training, Transfer Techniques, Reviewed Precautions, 

Correct Positioning, Safety Issues


Teaching Recipient:  Patient


Teaching Methods:  Demonstration, Discussion


Response to Teaching:  Reinforcement Needed





Discharge Recommendations


Plan


Patient will perform bed mobility and transfer training, balance and endurance 

training, functional strengthening, stair training, gait training, and 

education to improve functional mobility and independence at home.


Therapy D/C Recommendations:  Acute Rehab, Home w/ Family Support, Physical 

Therapy Home Care





Time/GCodes


Time In:  825


Time Out:  840


Total Billed Treatment Time:  15


Total Billed Treatment


1 visit


GEOVANNY 15'











YESSICA ESPANA PT Jul 5, 2017 08:43

## 2017-07-05 NOTE — PHYSICAL THERAPY DAILY NOTE
PT Daily Note-Current


Subjective


Patient on commode pre tx, agrees to PT, She would like to get back to bed so 

she can finish her lunch.  Will assist with this.





Appearance


Patient BTB post tx with nurse call, phone, tray, all needs met. Abduction 

pillow on.





Mental Status


Patient Orientation:  Person, Place, Situation


Attachments:  IV





Transfers


              Functional [place] Measure


0=Not Assessed/NA   4=Minimal Assistance


1=Total Assistance   5=Supervision or Setup


2=Maximal Assistance   6=Modified [place]


3=Moderate Assistance   7=Complete IndependenceIRFPAI Quality Coding Scale











6 Independent with activity with or without an assistive device


 


5  Patient requires set up or clean up by helper.  Patient completes activity  

by  themselves


 


4 Supervision or touching assist (CGA). [place] provide cues , steadying assist


 


3 The helper provides less than half the effort to complete the activity


 


2 The helper provides more than half the effort to complete the activity


 


1 Dependent.  The helper does all the effort to complete an activity 


 


7 Patient refused to complete or attempt activity


 


9 The patient did not perform the activity before the current illness or injury


 


88 Not attempted due to Medical conditions or safety concerns








Transfers (B, C, W/C) (FIM):  2


Scootin


Rolling:  3


Supine to/from Sit:  2


Sit to/from Stand:  3





Gait Training


Gait (FIM):  1


Distance:  5'


Gait Level of Assist:  4


Gait Persons Needed:  1


Gait Assistive Device:  FWW


  Patient ambulated 5' from the chair to the bed, she is able to advance her 

right leg pretty good but keeps knee extended and doesn't put much weight 

through it.  Patient is very afraid of falling.





Treatments


bed mobility and transfers, ambulation.





Assessment


Current Status:  Fair Progress


improving mobility, patient is able to advance her right leg during ambulation





PT Short Term Goals


Short Term Goals


Transfers (B,C,W/C) (FIM):  4





PT Long Term Goals


Long Term Goals


PT Long Term Goals Time Frame:  2017


Transfers (B,C,W/C) (FIM):  4


Gait (FIM):  2


Distance:  50'


Gait Level of Assist:  4


Gait Assistive Device:  FWW





PT Plan


Problem List


Problem List:  Activity Tolerance, Functional Strength, Safety, Balance, Gait, 

Transfer, Bed Mobility, ROM





Treatment/Plan


Treatment Plan:  Continue Plan of Care


Treatment Plan:  Bed Mobility, Education, Functional Activity [place], Functional 

Strength, Gait, Safety, Therapeutic Exercise, Transfers


Treatment Duration:  2017


Visits Per Week:  10-11


Minutes/Day (M-F):  15-30


Minutes/Day (Sat/[place]):  15-30





Safety Risks/Education


Patient Education:  Gait Training, Transfer Techniques, Correct Positioning, 

Safety Issues


Teaching Recipient:  Patient


Teaching Methods:  Demonstration, Discussion


Response to Teaching:  Reinforcement Needed





Time/GCodes


Time In:  1410


Time Out:  1425


Total Billed Treatment Time:  15


Total Billed Treatment


1 visit


GT 15'











YESSICA ESPANA PT 2017 15:03

## 2017-07-05 NOTE — PROGRESS NOTE (SOAP)
Subjective


Date Seen by Provider:  2017


Time Seen by Provider:  12:07


Subjective/Events-last exam


Awake and alert  Tolerated up in chair today


Review of Systems


Musculoskeletal:  leg pain


Dressing dry right hip  min swelling right hip





Objective


Exam





Vital Signs








  Date Time  Temp Pulse Resp B/P (MAP) Pulse Ox O2 Delivery O2 Flow Rate FiO2


 


17 08:00 99.1 86 20 116/57 94 Nasal Cannula 2.00 


 


17 08:00     94 Nasal Cannula 2.00 


 


17 07:35      Nasal Cannula 2.00 


 


17 03:39 97.3 79 16 112/53 96 Nasal Cannula 2.00 


 


17 23:24      Nasal Cannula 2.00 


 


17 23:09 96.9 83 16 118/68 97 Room Air  


 


17 22:55     97 Nasal Cannula 2.00 


 


17 18:19     95 Room Air  


 


17 17:45 97.8 81 18 128/58 95 Room Air  


 


17 16:48  85 11  98   


 


17 13:20 96.8 100 11 121/62 98   














I & O 


 


 17





 07:00


 


Intake Total 1500 ml


 


Output Total 1450 ml


 


Balance 50 ml





Capillary Refill : Less Than 3 SecondsLess Than 3 Seconds


General Appearance:  No Apparent Distress


Neck:  Normal Inspection


Respiratory:  Normal Breath Sounds, No Accessory Muscle Use (dressing dry right 

hip)


Gastrointestinal:  non tender


Extremity:  No Calf Tenderness


Neurologic/Psychiatric:  Oriented x3


Skin:  Warm/Dry





Results


Lab


Laboratory Tests


17 14:30: 


White Blood Count 9.9, Red Blood Count 3.82L, Hemoglobin 11.4L, Hematocrit 34L, 

Mean Corpuscular Volume 89, Mean Corpuscular Hemoglobin 30, Mean Corpuscular 

Hemoglobin Concent 33, Red Cell Distribution Width 13.3, Platelet Count 367, 

Mean Platelet Volume 8.5, Neutrophils (%) (Auto) 82H, Lymphocytes (%) (Auto) 6L

, Monocytes (%) (Auto) 11, Eosinophils (%) (Auto) 0, Basophils (%) (Auto) 0, 

Neutrophils # (Auto) 8.2H, Lymphocytes # (Auto) 0.6L, Monocytes # (Auto) 1.1H, 

Eosinophils # (Auto) 0.0, Basophils # (Auto) 0.0, Neutrophils % (Manual) 80, 

Lymphocytes % (Manual) 10, Monocytes % (Manual) 2, Eosinophils % (Manual) 0, 

Basophils % (Manual) 0, Band Neutrophils 8, Blood Morphology Comment NORMAL, 

Prothrombin Time 16.2H, INR Comment 1.3, Activated Partial Thromboplast Time 30

, Urine Color YELLOW, Urine Clarity CLEAR, Urine pH 6, Urine Specific Gravity 

1.010L, Urine Protein NEGATIVE, Urine Glucose (UA) NEGATIVE, Urine Ketones 

NEGATIVE, Urine Nitrite NEGATIVE, Urine Bilirubin NEGATIVE, Urine Urobilinogen 

NORMAL, Urine Leukocyte Esterase 1+H, Urine RBC (Auto) NEGATIVE, Urine RBC NONE

, Urine WBC NONE, Urine Squamous Epithelial Cells RARE, Urine Crystals NONE, 

Urine Bacteria NEGATIVE, Urine Casts NONE, Urine Mucus NEGATIVE, Urine Culture 

Indicated NO, Sodium Level 133L, Potassium Level 3.3L, Chloride Level 101, 

Carbon Dioxide Level 26, Anion Gap 6, Blood Urea Nitrogen 19H, Creatinine 1.27, 

Estimat Glomerular Filtration Rate 40, BUN/Creatinine Ratio 15, Glucose Level 

158H, Calcium Level 8.0L, Total Bilirubin 0.5, Aspartate Amino Transf (AST/SGOT

) 20, Alanine Aminotransferase (ALT/SGPT) 14, Alkaline Phosphatase 97, Total 

Protein 4.9L, Albumin 2.1L


17 04:38: 


White Blood Count 9.2, Red Blood Count 3.87L, Hemoglobin 11.8, Hematocrit 35, 

Mean Corpuscular Volume 90, Mean Corpuscular Hemoglobin 31, Mean Corpuscular 

Hemoglobin Concent 34, Red Cell Distribution Width 13.4, Platelet Count 284, 

Mean Platelet Volume 8.9, Prothrombin Time 16.2H, INR Comment 1.3, Sodium Level 

136, Potassium Level 3.2L, Chloride Level 108H, Carbon Dioxide Level 18L, Anion 

Gap 10, Blood Urea Nitrogen 14, Creatinine 0.90, Estimat Glomerular Filtration 

Rate 60, BUN/Creatinine Ratio 16, Glucose Level 116H, Calcium Level 7.5L





Microbiology


17 C. difficile GDH Antigen & Toxins - Final, Complete





Procedures


Status post bipolar femoral hemiarthroplasty right hip





Assessment/Plan


Assessment/Plan


Assess & Plan/Chief Complaint


Right femoral neck fx status post hemiarthroplasty


Final Diagnosis


displaced right femoral neck fx





Clinical Quality Measures


DVT/VTE Risk/Contraindication:


Risk Factor Score Per Nursin


RFS Level Per Nursing on Admit:  4+=Very High











ROSHAN,XENIA F DO 2017 12:14 pm

## 2017-07-06 VITALS — SYSTOLIC BLOOD PRESSURE: 114 MMHG | DIASTOLIC BLOOD PRESSURE: 53 MMHG

## 2017-07-06 VITALS — SYSTOLIC BLOOD PRESSURE: 106 MMHG | DIASTOLIC BLOOD PRESSURE: 52 MMHG

## 2017-07-06 VITALS — SYSTOLIC BLOOD PRESSURE: 106 MMHG | DIASTOLIC BLOOD PRESSURE: 55 MMHG

## 2017-07-06 VITALS — SYSTOLIC BLOOD PRESSURE: 107 MMHG | DIASTOLIC BLOOD PRESSURE: 51 MMHG

## 2017-07-06 VITALS — DIASTOLIC BLOOD PRESSURE: 53 MMHG | SYSTOLIC BLOOD PRESSURE: 108 MMHG

## 2017-07-06 VITALS — DIASTOLIC BLOOD PRESSURE: 53 MMHG | SYSTOLIC BLOOD PRESSURE: 101 MMHG

## 2017-07-06 LAB
ANION GAP SERPL CALC-SCNC: 4 MMOL/L (ref 5–14)
BUN SERPL-MCNC: 16 MG/DL (ref 7–18)
BUN/CREAT SERPL: 16
CALCIUM SERPL-MCNC: 7.4 MG/DL (ref 8.5–10.1)
CHLORIDE SERPL-SCNC: 107 MMOL/L (ref 98–107)
CO2 SERPL-SCNC: 21 MMOL/L (ref 21–32)
CREAT SERPL-MCNC: 0.98 MG/DL (ref 0.6–1.3)
ERYTHROCYTE [DISTWIDTH] IN BLOOD BY AUTOMATED COUNT: 14.2 % (ref 10–14.5)
GFR SERPLBLD BASED ON 1.73 SQ M-ARVRAT: 54 ML/MIN
GLUCOSE SERPL-MCNC: 136 MG/DL (ref 70–105)
INR PPP: 1.4 (ref 0.8–1.4)
MCH RBC QN AUTO: 30 PG (ref 25–34)
MCHC RBC AUTO-ENTMCNC: 33 G/DL (ref 32–36)
MCV RBC AUTO: 93 FL (ref 80–99)
PLATELET # BLD: 228 10^3/UL (ref 130–400)
PMV BLD AUTO: 9.1 FL (ref 7.4–10.4)
POTASSIUM SERPL-SCNC: 3.9 MMOL/L (ref 3.6–5)
PROTHROMBIN TIME: 17 SEC (ref 12.2–14.7)
RBC # BLD AUTO: 3.37 10^6/UL (ref 4.35–5.85)
SODIUM SERPL-SCNC: 132 MMOL/L (ref 135–145)
WBC # BLD AUTO: 6.2 10^3/UL (ref 4.3–11)

## 2017-07-06 RX ADMIN — CHOLESTYRAMINE SCH GM: 4 POWDER, FOR SUSPENSION ORAL at 20:10

## 2017-07-06 RX ADMIN — CHOLESTYRAMINE SCH GM: 4 POWDER, FOR SUSPENSION ORAL at 14:03

## 2017-07-06 RX ADMIN — LACTOBACILLUS TAB SCH TAB.CHEW: TAB at 05:55

## 2017-07-06 RX ADMIN — LACTOBACILLUS TAB SCH TAB.CHEW: TAB at 16:01

## 2017-07-06 RX ADMIN — ASPIRIN SCH MG: 325 TABLET, COATED ORAL at 09:31

## 2017-07-06 RX ADMIN — LACTOBACILLUS TAB SCH TAB.CHEW: TAB at 12:13

## 2017-07-06 RX ADMIN — POTASSIUM CHLORIDE SCH MEQ: 1500 TABLET, EXTENDED RELEASE ORAL at 05:55

## 2017-07-06 RX ADMIN — DOCUSATE SODIUM AND SENNOSIDES SCH EA: 8.6; 5 TABLET, FILM COATED ORAL at 08:23

## 2017-07-06 RX ADMIN — DOCUSATE SODIUM AND SENNOSIDES SCH EA: 8.6; 5 TABLET, FILM COATED ORAL at 20:10

## 2017-07-06 RX ADMIN — ENOXAPARIN SODIUM SCH MG: 100 INJECTION SUBCUTANEOUS at 09:31

## 2017-07-06 NOTE — PROGRESS NOTE (SOAP)
Subjective


Date Seen by Provider:  2017


Time Seen by Provider:  07:08


Subjective/Events-last exam


No complaints currently, She had the nurse remove the AARON hose stating they 

were too painful to wear. She reports some progress yesterday with physical 

therapy. She reports diarrhea is improving.





Objective


Exam





Vital Signs








  Date Time  Temp Pulse Resp B/P (MAP) Pulse Ox O2 Delivery O2 Flow Rate FiO2


 


17 03:40 98.5 80 18 101/53 94 Nasal Cannula 2.00 


 


17 00:24 98.6 88 18 106/52 96 Nasal Cannula 2.00 


 


17 20:00     94 Nasal Cannula 2.00 


 


17 20:00 98.0 88 22 112/63 96 Nasal Cannula 2.00 


 


17 16:00 98.6 90 22 102/61 97 Nasal Cannula 2.00 


 


17 12:00 98.1 84 20 98/50 98 Nasal Cannula 2.00 


 


17 08:00 99.1 86 20 116/57 94 Nasal Cannula 2.00 


 


17 08:00     94 Nasal Cannula 2.00 


 


17 07:35      Nasal Cannula 2.00 














I & O 


 


 17





 07:00


 


Intake Total 2660 ml


 


Output Total 570 ml


 


Balance 2090 ml





Capillary Refill : Less Than 3 SecondsLess Than 3 Seconds


General Appearance:  No Apparent Distress


Peripheral Pulses:  2+ Radial Pulses (R), 2+ Radial Pulses (L)


Extremity:  Normal Capillary Refill, Normal Inspection, No Calf Tenderness, 

Pedal Edema


Neurologic/Psychiatric:  Alert, Oriented x3, No Motor/Sensory Deficits, Normal 

Mood/Affect


Skin:  Normal Color, Warm/Dry (Dressing to right hip CDI)





Results


Lab


Laboratory Tests


17 05:50: 


White Blood Count 6.2, Red Blood Count 3.37L, Hemoglobin 10.2L, Hematocrit 31L, 

Mean Corpuscular Volume 93, Mean Corpuscular Hemoglobin 30, Mean Corpuscular 

Hemoglobin Concent 33, Red Cell Distribution Width 14.2, Platelet Count 228, 

Mean Platelet Volume 9.1, Prothrombin Time 17.0H, INR Comment 1.4, Sodium Level 

132L, Potassium Level 3.9, Chloride Level 107, Carbon Dioxide Level 21, Anion 

Gap 4L, Blood Urea Nitrogen 16, Creatinine 0.98, Estimat Glomerular Filtration 

Rate 54, BUN/Creatinine Ratio 16, Glucose Level 136H, Calcium Level 7.4L





Microbiology


17 C. difficile GDH Antigen & Toxins - Final, Complete





Assessment/Plan


Assessment/Plan


Assess & Plan/Chief Complaint


A: traumatic displaced right femoral neck hip fracture


s/p right hip prosthesis


debility


fall


hyponatremia


resolved hypokalemia


diarrhea





P: continue current treatment, IV fluids DC'd due to hyponatremia, check with 

Dr. Vogt to DC no, spoke with patient at length about letting the nurse 

put on her AARON hose, spoke with patient about pain meds and she is willing to 

take some ultram prior to therapy and as needed for pain, Plan to DC to [facility] 

tomorrow.





Clinical Quality Measures


DVT/VTE Risk/Contraindication:


Risk Factor Score Per Nursin


RFS Level Per Nursing on Admit:  4+=Very High











JOSE ALAMO 2017 7:13 am

## 2017-07-06 NOTE — OCCUPATIONAL THER DAILY NOTE
OT Current Status-Daily Note


Subjective


Pt. on phone ordering food when OT entered room.  States, "nevermind, the 

therapist lady is here."  Hangs up phone.  States that she is hungry but will 

eat when we are done.  Pt. states, "no one gives you time to eat around here."  

OT reminded that pt that pt., and family set up time for OT to come and assist 

pt. with bathing task at this time.





Appearance


Pt. had agreed to bathe this afternoon with OT to work on using the adaptive 

equipment that OT educated her on this morning.  Pt. states, "oh well I have 

already done that."  Street clothing not available and so pt. and OT practiced 

doffing/donning socks with AE.





Mental Status/Objective


Patient Orientation:  Unable to Assess


              Functional [place] Measure


0=Not Assessed/NA   4=Minimal Assistance


1=Total Assistance   5=Supervision or Setup


2=Maximal Assistance   6=Modified [place]


3=Moderate Assistance   7=Complete [place]


Attachments:  IV





ADL-Treatment


Lower Body Dressing (FIM):  3 (Max cues and mod assist needed to practice 

doffing/donning socks using sock aide and dressing stick/reacher.)





Other Treatment


Pt. did demonstrate some memory deficits this session, as she did not remember 

when OT was coming in or that a bath was going to be practiced.  Unsure if this 

is due to hearing deficit, or if pt. just did not remember.





Education


OT Patient Education:  Modified ADL techniques, Progress toward Goal/Update tx 

plan, Purpose of tx/functional activities, Reviewed precautions, Rehab process, 

Transfer techniques, Use of adapted equipment


Teaching Recipient:  Patient


Teaching Methods:  Demonstration, Discussion


Response to Teaching:  Verbalize Understanding, Return Demonstration





OT Short Term Goals


Short Term Goals


Time Frame:  Jul 12, 2017


Eating(FIM):  6


Grooming(FIM):  5


Bathing(FIM):  4


Upper Body Dressing(FIM):  5


Lower Body Dressing(FIM):  4


Toileting(FIM):  5


Transfers (B,C,W/C) (FIM):  4


Toilet/Commode Transfer(FIM):  4


Shower Transfer(FIM):  4


Additional Short Term Goals:  1-Demonstrate ADL Tasks, 2-Verbalize Understanding

, 3-ImproveStrength/[place]


1=Demonstrate adherence to instructed precautions during ADL tasks.


2=Patient will verbalize/demonstrate understanding of assistive devices/

modifications for ADL.


3=Patient will improve strength/tolerance for activity to enable patient to 

perform ADL's.





OT Long Term Goals


Long Term Goals


Time Frame:  Jul 26, 2017


Eating (FIM):  6


Grooming(FIM):  6


Bathing(FIM):  5


Upper Body Dressing(FIM):  5


Lower Body Dressing(FIM):  5


Toileting(FIM):  6


Transfers (B,C,W/C) (FIM):  5


Toilet/Commode Transfer(FIM):  6


Shower Transfer(FIM):  5


Additional Goals:  1-Demonstrate ADL Tasks, 2-Verbalize Understanding, 3-

ImproveStrength/[place]


1=Demonstrate adherence to instructed precautions during ADL tasks.


2=Patient will verbalize/demonstrate understanding of assistive devices/

modifications for ADL.


3=Patient will improve strength/tolerance for activity to enable patient to 

perform ADL's.





OT Education/Plan


Problem List/Assessment


Assessment:  Decreased Activ Tolerance, Decreased UE Strength, Dependent 

Transfers, Impaired Bed Mobility, Impaired I ADL's, Impaired Self-Care Skills, 

Restricted Funct UE ROM





Discharge Recommendations


Plan/Recommendations:  Continue POC


Therapy D/C Recommendations:  Acute Rehab


Equpiment Recommendations-D/C:  Hip Kit


Barriers to Progress


[place], possible cognitive deficits.


Target Placement


Acute rehab and then home with family support.





Treatment Plan/Plan of Care


Treatment,Training & Education:  Yes


Patient would benefit from OT for education, treatment and training to promote 

independence in ADL's, mobility, safety and/or upper extremity function for ADL'

s.


Plan of Care:  ADL Retraining, Caregiver Training, Functional Mobility, UE 

Funct Exercise/Act


Treatment Duration:  Jul 26, 2017


Frequency:  At least 5-7 days/Wk (IRF)


Estimated Hrs Per Day:  .5 hour per day


Rehab Potential:  Good





Time/GCodes


Start Time:  12:40


Stop Time:  12:55


Total Time Billed (hr/min):  15


Billed Treatment Time


1, ADL











MEREDITH GARSIA OT Jul 6, 2017 13:45

## 2017-07-06 NOTE — PHYSICAL THERAPY DAILY NOTE
PT Daily Note-Current


Subjective


Patient is on commode and agrees to PT.





Pain





   Numeric Pain Scale:  8


   Location:  Right


   Location Body Site:  Hip


   Pain Description:  Acute





Appearance


bilateral LE 3+ edema





Mental Status


Patient Orientation:  Normal For Age


Attachments:  Cevallos Catheter





Transfers


              Functional [place] Measure


0=Not Assessed/NA   4=Minimal Assistance


1=Total Assistance   5=Supervision or Setup


2=Maximal Assistance   6=Modified [place]


3=Moderate Assistance   7=Complete IndependenceIRFPAI Quality Coding Scale











6 Independent with activity with or without an assistive device


 


5  Patient requires set up or clean up by helper.  Patient completes activity  

by  themselves


 


4 Supervision or touching assist (CGA). [place] provide cues , steadying assist


 


3 The helper provides less than half the effort to complete the activity


 


2 The helper provides more than half the effort to complete the activity


 


1 Dependent.  The helper does all the effort to complete an activity 


 


7 Patient refused to complete or attempt activity


 


9 The patient did not perform the activity before the current illness or injury


 


88 Not attempted due to Medical conditions or safety concerns








Transfers (B, C, W/C) (FIM):  4


Scootin


Rollin


Supine to/from Sit:  4


Sit to/from Stand:  4


assist with right LE with bed mobility





Weight Bearing


Weight Bearing Restriction:  Weight Bearing/Tolerated


Location Restriction:  R LE





Gait Training


Gait (FIM):  2


Distance (FIM):  6=350-99 ft


Distance:  100'


Gait Level of Assist:  4


Gait Persons Needed:  1


Gait Assistive Device:  FWW


slow, antalgic, step to gait sequence





Exercises


Supine Ex:  Ankle pumps, Quad Set, Heel Slides


Supine Reps:  10





Assessment


Patient appears very timid and apprehensive with all movement.  PT reassured 

patient and family that ambulation and exercise will help facilitate 

strengthening for patient to return to home safely.





PT Short Term Goals


Short Term Goals


Transfers (B,C,W/C) (FIM):  4





PT Long Term Goals


Long Term Goals


PT Long Term Goals Time Frame:  2017


Transfers (B,C,W/C) (FIM):  4


Gait (FIM):  2


Distance:  50'


Gait Level of Assist:  4


Gait Assistive Device:  FWW





PT Plan


Treatment/Plan


Treatment Plan:  Continue Plan of Care


Treatment Plan:  Bed Mobility, Education, Functional Activity [place], Functional 

Strength, Gait, Safety, Therapeutic Exercise, Transfers


Treatment Duration:  2017


Frequency:  6 times per week


Estimated Hrs Per Day:  1 hour per day (PRN)


Patient and/or Family Agrees t:  Yes





Time/GCodes


Time In:  1440


Time Out:  1505


Total Billed Treatment Time:  25


Total Billed Treatment


1 visit


EX 10 min


GT 15 min











TRAMAINE WILLIAM PT 2017 15:11

## 2017-07-06 NOTE — PROGRESS NOTE-HOSPITALIST
Progress Note


HPI/CC on Admission


CC: Medical management following hip fracture





HPI: This is a 82 yoWF pt of Dr. NOÉ Dean presented after fall at home. She 

sustained a right hip fracture that was repaired in an uncomplicated manner by 

Dr. Goodrich last evening and currently there is a pt eval for in-pt rehab.





RN Review:


Pt states that she has had diarrhea for two weeks. Pt has recently been on Cipro


Stool sample was collected.


K+ low, 20 K+ was added to IV and 20 PO as well


Pt is hesitant to take pain meds, like hydros. Pt states that Tylenol does not 

generally help





SW Review:


Pt and her daughters are very nervous in the medical scenario currently


Rehab meets qualification for diagnosis, but if not [facility]


Pt taking Flagyl from [place] in ED for negative stool w/u





Patient Interview:


Pt's daughters state that pt does not see Dr. Dean, but is considering seeing 

her as a PCP.


Pt lives at home alone


Pt was talking about her ankle brace with PT upon interview


Pt confirms Dr. Dean as PCP, but has not seen him yet.


Pt states that she was experiencing constipation, then diarrhea.


Pt's daughters state that she had a UTI previously


Physical exam stable. Lungs sound perfect.


Pt states that she does not like yogurt but will eat it.


In pt rehab was discussed


Pt's daughters asked the difference between Culturell or yogurt. They were 

informed that they are similar


I informed her that I will see her tomorrow, and she seemed pleased with this.





Scribed by Roxana Estevez under the direct supervision of Dr. Martinez.





Progress Notes/Assess & Plan


Date Seen


7/6/17


Time Seen by Provider:  08:30


Admission Dx/Process


Assessment:


Right hip fracture POD # 1 uncomplicated


Chronic diarrhea C. diff negative started on Lactinex and Questran


Hypokalemia


Chronic edema


Diagonsis/Assessment & Plan


Pt doing well.


Diarrhea continues so will change the Questran to TID scheduled


Catheter still in place so will DC


Pain is controlled


Edema still persists since this is chronic and has not seen a doctor for years


Ultram tolerated well


Rehab tomorrow





AFVSS, [place], up in chair, daughters at the bedside


RRR, CTAB no change in edema lower extremities





Laboratory Tests


7/6/17 05:50

















Assessment:


Right hip fracture POD # 2 uncomplicated


Chronic diarrhea C. diff negative started on Lactinex and Questran needs 

colonoscopy as outpt since daughters requested that to be done


Hypokalemia replaced at 3.9


Chronic edema


Hyponatremia








Plan:


Lactinex empirically


Rehab tomorrow


Continue yogurt for active cultures


Questran TID scheduled


PT/OT


DVT Px











RUTH ANN MARTINEZ DO Jul 6, 2017 09:18

## 2017-07-06 NOTE — PHYSICAL THERAPY DAILY NOTE
PT Daily Note-Current


Subjective


Patient in chair pre tx, agrees to PT, patient states she has no pain at rest.





Appearance


Patient in hip chair post tx with nurse call, tray, family in the room.





Mental Status


Patient Orientation:  Person, Place, Situation





Transfers


              Functional [place] Measure


0=Not Assessed/NA   4=Minimal Assistance


1=Total Assistance   5=Supervision or Setup


2=Maximal Assistance   6=Modified [place]


3=Moderate Assistance   7=Complete IndependenceIRFPAI Quality Coding Scale











6 Independent with activity with or without an assistive device


 


5  Patient requires set up or clean up by helper.  Patient completes activity  

by  themselves


 


4 Supervision or touching assist (CGA). [place] provide cues , steadying assist


 


3 The helper provides less than half the effort to complete the activity


 


2 The helper provides more than half the effort to complete the activity


 


1 Dependent.  The helper does all the effort to complete an activity 


 


7 Patient refused to complete or attempt activity


 


9 The patient did not perform the activity before the current illness or injury


 


88 Not attempted due to Medical conditions or safety concerns








Transfers (B, C, W/C) (FIM):  4


Sit to/from Stand:  4


min assist for sit to stand





Gait Training


Gait (FIM):  1


Distance:  15'


Gait Level of Assist:  4


Gait Persons Needed:  1


Gait Assistive Device:  FWW


Patient ambulated 15' with CGA using a rolling walker.  She ambulated to her 

door and back to her chair.  Slow, antalgic ambulation, she keeps her right leg 

well out in front and performed a step-to gait.





Exercises


Seated Therapy Exercises:  Ankle pumps, Long arc quads, Hip flexion


Seated Reps:  10





Treatments


transfers, ambulation, functional strengthening





Assessment


Current Status:  Fair Progress


improved endurance and ambulation, patient coming to rehab tomorrow





PT Short Term Goals


Short Term Goals


Transfers (B,C,W/C) (FIM):  4





PT Long Term Goals


Long Term Goals


PT Long Term Goals Time Frame:  Jul 12, 2017


Transfers (B,C,W/C) (FIM):  4


Gait (FIM):  2


Distance:  50'


Gait Level of Assist:  4


Gait Assistive Device:  FWW





PT Plan


Problem List


Problem List:  Activity Tolerance, Functional Strength, Safety, Balance, Gait, 

Transfer, Bed Mobility, ROM





Treatment/Plan


Treatment Plan:  Continue Plan of Care


Treatment Plan:  Bed Mobility, Education, Functional Activity Ashley, Functional 

Strength, Gait, Safety, Therapeutic Exercise, Transfers


Treatment Duration:  Jul 12, 2017


Visits Per Week:  10-11


Minutes/Day (M-F):  15-30


Minutes/Day (Sat/[place]):  15-30





Safety Risks/Education


Patient Education:  Gait Training, Transfer Techniques, Correct Positioning, 

Safety Issues


Teaching Recipient:  Patient


Teaching Methods:  Demonstration, Discussion


Response to Teaching:  Reinforcement Needed





Time/GCodes


Time In:  1030


Time Out:  1045


Total Billed Treatment Time:  15


Total Billed Treatment


1 visit


GT 15'











YESSICA ESPANA PT Jul 6, 2017 10:48

## 2017-07-06 NOTE — OCCUPATIONAL THER DAILY NOTE
OT Current Status-Daily Note


Subjective


Pt. up eating breakfast.  Does not report pain.





Appearance


Pt. up in chair, alert, and daughters in room.  Pt. is [place].





Mental Status/Objective


Patient Orientation:  Person


              Functional [place] Measure


0=Not Assessed/NA   4=Minimal Assistance


1=Total Assistance   5=Supervision or Setup


2=Maximal Assistance   6=Modified [place]


3=Moderate Assistance   7=Complete [place]


Attachments:  IV, Oxygen





ADL-Treatment


Eating (FIM):  6 (Pt. is eating breakfast.  Has no difficulty feeding self, 

opening containers.)





Other Treatment


Daughters in room, and have questions about pt's care.  They were concerned 

that pt. did not walk yesterday.  OT pulled up PT's notes.  Explained the 

amount of assist that pt. needs, as well as the projection of mobility after a 

surgery like she had.  They verbalized understanding.  Pt. is supposed to go to 

rehab this week.  Spoke with  who states pt. is coming to rehab 

tomorrow.  Spoke with pt. and family in depth regarding rehab goals, and plans.

  Explained the need for 3 hours of therapy to increase independence, how all 

therapies will work, and issued/educated pt. on adaptive equipment for LE 

dressing and bathing.  Pt. nods that she understands, but still eating 

breakfast.  Daughters verbalize understanding.  Daughter does ask this OT if 

inpt. rehab will address pt's hearing, as she feels that this is a safety risk.

  Let her know that we do not fit pt's with hearing aides, and that this will 

need to be done after hospitalization.  Educated pt. and family on life alert 

necklace, and safety at home.  Due to time constraints, and pt. eating breakfast

, this OT will come back this afternoon to practice using equipment and to 

provide bath to pt.





Education


OT Patient Education:  Correct positioning, Modified ADL techniques, Progress 

toward Goal/Update tx plan, Purpose of tx/functional activities, Reviewed 

precautions, Rehab process, Transfer techniques, Use of adapted equipment


Teaching Recipient:  Patient


Teaching Methods:  Demonstration, Discussion


Response to Teaching:  Verbalize Understanding, Return Demonstration





OT Short Term Goals


Short Term Goals


Time Frame:  Jul 12, 2017


Eating(FIM):  6


Grooming(FIM):  5


Bathing(FIM):  4


Upper Body Dressing(FIM):  5


Lower Body Dressing(FIM):  4


Toileting(FIM):  5


Transfers (B,C,W/C) (FIM):  4


Toilet/Commode Transfer(FIM):  4


Shower Transfer(FIM):  4


Additional Short Term Goals:  1-Demonstrate ADL Tasks, 2-Verbalize Understanding

, 3-ImproveStrength/Ashley


1=Demonstrate adherence to instructed precautions during ADL tasks.


2=Patient will verbalize/demonstrate understanding of assistive devices/

modifications for ADL.


3=Patient will improve strength/tolerance for activity to enable patient to 

perform ADL's.





OT Long Term Goals


Long Term Goals


Time Frame:  Jul 26, 2017


Eating (FIM):  6


Grooming(FIM):  6


Bathing(FIM):  5


Upper Body Dressing(FIM):  5


Lower Body Dressing(FIM):  5


Toileting(FIM):  6


Transfers (B,C,W/C) (FIM):  5


Toilet/Commode Transfer(FIM):  6


Shower Transfer(FIM):  5


Additional Goals:  1-Demonstrate ADL Tasks, 2-Verbalize Understanding, 3-

ImproveStrength/Ashley


1=Demonstrate adherence to instructed precautions during ADL tasks.


2=Patient will verbalize/demonstrate understanding of assistive devices/

modifications for ADL.


3=Patient will improve strength/tolerance for activity to enable patient to 

perform ADL's.





OT Education/Plan


Problem List/Assessment


Assessment:  Decreased Activ Tolerance, Dependent Transfers, Impaired Bed 

Mobility, Impaired Funct Balance, Impaired I ADL's, Impaired Self-Care Skills, 

Restricted Funct UE ROM





Discharge Recommendations


Plan/Recommendations:  Continue POC


Therapy D/C Recommendations:  Acute Rehab





Treatment Plan/Plan of Care


Treatment,Training & Education:  Yes


Patient would benefit from OT for education, treatment and training to promote 

independence in ADL's, mobility, safety and/or upper extremity function for ADL'

s.


Plan of Care:  ADL Retraining, Caregiver Training, Functional Mobility, UE 

Funct Exercise/Act


Treatment Duration:  Jul 26, 2017


Visits Per Week:  5


Rehab Potential:  Good





Time/GCodes


Start Time:  08:25


Stop Time:  09:00


Total Time Billed (hr/min):  30


Billed Treatment Time


7146-7276 1, visit x 15minutes


[id number] 1, visit x 15minutes











MEREDITH GARSIA OT Jul 6, 2017 10:08

## 2017-07-07 ENCOUNTER — HOSPITAL ENCOUNTER (INPATIENT)
Dept: HOSPITAL 75 - IRF | Age: 82
LOS: 18 days | Discharge: NURSING, REHAB OR LONG-TERM CARE | DRG: 560 | End: 2017-07-25
Attending: PHYSICAL MEDICINE & REHABILITATION | Admitting: PHYSICAL MEDICINE & REHABILITATION
Payer: MEDICARE

## 2017-07-07 VITALS — DIASTOLIC BLOOD PRESSURE: 59 MMHG | SYSTOLIC BLOOD PRESSURE: 102 MMHG

## 2017-07-07 VITALS — SYSTOLIC BLOOD PRESSURE: 107 MMHG | DIASTOLIC BLOOD PRESSURE: 52 MMHG

## 2017-07-07 VITALS — HEIGHT: 63 IN | WEIGHT: 140 LBS | BODY MASS INDEX: 24.8 KG/M2

## 2017-07-07 VITALS — DIASTOLIC BLOOD PRESSURE: 55 MMHG | SYSTOLIC BLOOD PRESSURE: 109 MMHG

## 2017-07-07 VITALS — SYSTOLIC BLOOD PRESSURE: 111 MMHG | DIASTOLIC BLOOD PRESSURE: 53 MMHG

## 2017-07-07 DIAGNOSIS — B35.1: ICD-10-CM

## 2017-07-07 DIAGNOSIS — G60.9: ICD-10-CM

## 2017-07-07 DIAGNOSIS — K51.00: ICD-10-CM

## 2017-07-07 DIAGNOSIS — H61.21: ICD-10-CM

## 2017-07-07 DIAGNOSIS — E87.6: ICD-10-CM

## 2017-07-07 DIAGNOSIS — F41.9: ICD-10-CM

## 2017-07-07 DIAGNOSIS — M20.42: ICD-10-CM

## 2017-07-07 DIAGNOSIS — E87.1: ICD-10-CM

## 2017-07-07 DIAGNOSIS — D50.0: ICD-10-CM

## 2017-07-07 DIAGNOSIS — E88.09: ICD-10-CM

## 2017-07-07 DIAGNOSIS — E46: ICD-10-CM

## 2017-07-07 DIAGNOSIS — R60.9: ICD-10-CM

## 2017-07-07 DIAGNOSIS — L85.9: ICD-10-CM

## 2017-07-07 DIAGNOSIS — S72.011D: Primary | ICD-10-CM

## 2017-07-07 LAB
ALBUMIN SERPL-MCNC: 1.7 GM/DL (ref 3.2–4.5)
ALT SERPL-CCNC: 10 U/L (ref 0–55)
ANION GAP SERPL CALC-SCNC: 4 MMOL/L (ref 5–14)
AST SERPL-CCNC: 20 U/L (ref 5–34)
BASOPHILS # BLD AUTO: 0 10^3/UL (ref 0–0.1)
BASOPHILS NFR BLD AUTO: 0 % (ref 0–10)
BILIRUB SERPL-MCNC: 0.4 MG/DL (ref 0.1–1)
BUN SERPL-MCNC: 17 MG/DL (ref 7–18)
BUN/CREAT SERPL: 20
CALCIUM SERPL-MCNC: 7.9 MG/DL (ref 8.5–10.1)
CHLORIDE SERPL-SCNC: 109 MMOL/L (ref 98–107)
CO2 SERPL-SCNC: 21 MMOL/L (ref 21–32)
CREAT SERPL-MCNC: 0.85 MG/DL (ref 0.6–1.3)
EOSINOPHIL # BLD AUTO: 0.1 10^3/UL (ref 0–0.3)
EOSINOPHIL NFR BLD AUTO: 2 % (ref 0–10)
ERYTHROCYTE [DISTWIDTH] IN BLOOD BY AUTOMATED COUNT: 13.9 % (ref 10–14.5)
GFR SERPLBLD BASED ON 1.73 SQ M-ARVRAT: > 60 ML/MIN
GLUCOSE SERPL-MCNC: 105 MG/DL (ref 70–105)
LYMPHOCYTES # BLD AUTO: 0.8 X 10^3 (ref 1–4)
LYMPHOCYTES NFR BLD AUTO: 12 % (ref 12–44)
MCH RBC QN AUTO: 30 PG (ref 25–34)
MCHC RBC AUTO-ENTMCNC: 34 G/DL (ref 32–36)
MCV RBC AUTO: 90 FL (ref 80–99)
MONOCYTES # BLD AUTO: 0.9 X 10^3 (ref 0–1)
MONOCYTES NFR BLD AUTO: 13 % (ref 0–12)
NEUTROPHILS # BLD AUTO: 4.8 X 10^3 (ref 1.8–7.8)
NEUTROPHILS NFR BLD AUTO: 73 % (ref 42–75)
PLATELET # BLD: 278 10^3/UL (ref 130–400)
PMV BLD AUTO: 8.7 FL (ref 7.4–10.4)
POTASSIUM SERPL-SCNC: 4.4 MMOL/L (ref 3.6–5)
PROT SERPL-MCNC: 4.3 GM/DL (ref 6.4–8.2)
RBC # BLD AUTO: 3.12 10^6/UL (ref 4.35–5.85)
SODIUM SERPL-SCNC: 134 MMOL/L (ref 135–145)
WBC # BLD AUTO: 6.6 10^3/UL (ref 4.3–11)

## 2017-07-07 PROCEDURE — 82962 GLUCOSE BLOOD TEST: CPT

## 2017-07-07 PROCEDURE — 80048 BASIC METABOLIC PNL TOTAL CA: CPT

## 2017-07-07 PROCEDURE — 87046 STOOL CULTR AEROBIC BACT EA: CPT

## 2017-07-07 PROCEDURE — 87324 CLOSTRIDIUM AG IA: CPT

## 2017-07-07 PROCEDURE — 36415 COLL VENOUS BLD VENIPUNCTURE: CPT

## 2017-07-07 PROCEDURE — 88305 TISSUE EXAM BY PATHOLOGIST: CPT

## 2017-07-07 PROCEDURE — 82274 ASSAY TEST FOR BLOOD FECAL: CPT

## 2017-07-07 PROCEDURE — 85025 COMPLETE CBC W/AUTO DIFF WBC: CPT

## 2017-07-07 PROCEDURE — 80053 COMPREHEN METABOLIC PANEL: CPT

## 2017-07-07 PROCEDURE — 74000: CPT

## 2017-07-07 PROCEDURE — 87449 NOS EACH ORGANISM AG IA: CPT

## 2017-07-07 PROCEDURE — 94664 DEMO&/EVAL PT USE INHALER: CPT

## 2017-07-07 PROCEDURE — 87045 FECES CULTURE AEROBIC BACT: CPT

## 2017-07-07 RX ADMIN — LACTOBACILLUS TAB SCH TAB.CHEW: TAB at 05:13

## 2017-07-07 RX ADMIN — LACTOBACILLUS TAB SCH TAB.CHEW: TAB at 17:11

## 2017-07-07 RX ADMIN — CHOLESTYRAMINE SCH GM: 4 POWDER, FOR SUSPENSION ORAL at 10:01

## 2017-07-07 RX ADMIN — CHOLESTYRAMINE SCH GM: 4 POWDER, FOR SUSPENSION ORAL at 14:15

## 2017-07-07 RX ADMIN — CHOLESTYRAMINE SCH GM: 4 POWDER, FOR SUSPENSION ORAL at 20:34

## 2017-07-07 RX ADMIN — DOCUSATE SODIUM AND SENNOSIDES SCH EA: 8.6; 5 TABLET, FILM COATED ORAL at 10:02

## 2017-07-07 RX ADMIN — ASPIRIN SCH MG: 325 TABLET, COATED ORAL at 10:02

## 2017-07-07 RX ADMIN — POTASSIUM CHLORIDE SCH MEQ: 1500 TABLET, EXTENDED RELEASE ORAL at 05:13

## 2017-07-07 RX ADMIN — ENOXAPARIN SODIUM SCH MG: 100 INJECTION SUBCUTANEOUS at 10:01

## 2017-07-07 NOTE — HISTORY AND PHYSICAL
DATE OF ADMISSION: 07/07/2017  

 

CHIEF COMPLAINT: 

Difficulty with walking. 

 

HISTORY OF PRESENT ILLNESS:

The patient is a 81 yo  female who lives alone and had been independent

with a wheeled walker, who fell at home and sustained a displaced

femoral neck fracture of the right hip. The patient was admitted

to [facility] on 07/04 and underwent a bipolar femoral

hemiarthroplasty of the right hip and repair of chronic gluteus

medius tendon avulsion right hip, Dr. Goodrich. The patient is

weight-bearing as tolerated postoperatively.  The patient had a

decline in her functional independence due to this.  Her [place]

catheter has just been removed this morning. She is referred to

Inpatient Rehabilitation Unit for comprehensive program of

orthopedic rehabilitation prior to discharge home.



    Present level of function : Currently she

is mod assist for ambulation short distances with a front wheeled

walker.She is mod assist for bed mobilty and transfers  Bladder to have trial 
of voiding. She is having some

loose stools and is on Questran for that.  She requires

assistance for her ADLs. She is max assist for lower body dressing and mod 
assist for upper body dressing.Sheis setup for eating and grooming Mod assist 
for toileting She has supportive children nearby. 

 

PAST MEDICAL HISTORY: 

PCP Dr. Dean. She reports chronic edema in her ankles for the

past 2 weeks, Dr. Vogt has addressed this.  Also has

hyponatremia with a serum sodium of 132 on 07/06.  She has

postoperative anemia with hemoglobin of 10.2 on 07/06. 

 

PAST SURGICAL HISTORY:

As per above. 

 

ALLERGIES:

1.   clindamycin 

2.   sulfa  

3.   nystatin 

4.   amoxicillin 

5.   chlorhexidine 

 

FAMILY HISTORY:

Noncontributory. 

 

SOCIAL HISTORY:

Essentially as per above. 

 

REVIEW OF SYSTEMS:

Ten-point review of systems significant for loose stools, hip

pain, knee pain. She reports having arthroscopy for DJD of the

right hip and right knee in the past.  

 

MEDICATIONS:   

1.    mg p.o. q.d.   

2.   KCL 20 mEq p.o. daily. 

3.   Lactinex 1 tablet p.o. t.i.d. before meals.  

4.   Lortab generic 5/325, 1 tablet p.o. q.4 hours p.r.n.

moderate pain.  

5.   Tramadol 50 mg p.o. q.6 hours p.r.n. mild pain. 

6.   Questran 4 grams p.o. t.i.d. 

7.   Claritin 10 mg p.o. daily. 

8.   Lovenox 40 mg subcutaneous daily for DVT prophylaxis. 

  

    Prior level of function: Had been Independent as per above



PHYSICAL EXAMINATION: Significant for pleasant female appearing

her stated age, sitting up in a chair in no acute distress. 

VITAL SIGNS: Within normal limits. She is afebrile. 

HEENT: Vision, speech, hearing, grossly intact. No oral lesion is

noted. 

NECK: Supple without mass. 

HEART: Regular rhythm.  

LUNGS:  Clear.   

ABDOMEN:  Soft, nontender.  Bowel sounds present. 

EXTREMITIES: The patient has pitting peripheral edema at the

ankles. No calf tenderness. Incision site covered with dressing,

dry and intact. 

MUSCULOSKELETAL: The patient has functional active range of

motion in both upper extremities, left lower extremity.  Right

lower extremity limited due to recent fracture and repair. 

NEUROLOGIC: Cognitively grossly intact. Sensation intact to

touch. Strength good both upper extremities, left lower

extremity. Right lower extremity limited to hip due to recent

fracture and repair. She is able to plantar and dorsi flex at the

right ankle and active knee flex and extend. 

 

IMPRESSION:

1.   Ambulatory dysfunction secondary to fall with resulting

displaced femoral neck fracture, right hip, status post bipolar

femoral hemiarthroplasty right hip, Dr. Goodrich 07/04/2017, [facility]

[facility] and repair of chronic gluteus medius avulsion

right hip. 

2.   Postoperative anemia. 

3.   Postop DVT prophylaxis on Lovenox subcutaneous.  

4.   Hyponatremia. 

5.   Postop diarrhea, on Questran. 

6.   Hypokalemia, replaced. 

 

PLAN:

The patient will have a comprehensive program of inpatient

rehabilitation with a goal of maximizing level of functional

independence prior to discharge home with home health and family.

The patient will have PT/OT 90 minutes per day, each discipline,

5 days week for gait, strengthening, conditioning, ADLs, any

patient training necessary, any adaptive equipment and training

necessary for 2 weeks' time. Speech therapy do cognitive

assessment and treat as indicated. Rehabilitation nursing assist

with bowel, bladder, skin, wound care, medication administration,

pain management, monitor for urinary retention. Monitor for

improvement in loose stools.  to assist with

discharge planning, community reentry. Follow-up with Dr. Vogt

and Joleen as per their schedules. 

 

ESTIMATED LENGTH OF STAY:   

Two weeks. 

 

PROGNOSIS:

Rehab prognosis appears good for goal of discharging home with

family and home health care, modified independent to supervision

for ADLs and mobility skills. 

 

DIET:

Regular. 

 

CODE STATUS:  

Full code.  

 

 

Job ID: [id number] 

Dictated Date: 07/07/2017 11:09:48 

Transcription Date: 07/07/2017 15:04:00/wil MCLEOD

## 2017-07-07 NOTE — OCCUPATIONAL THERAPY EVAL
OT Evaluation-General/PLF


Medical Diagnosis


Admission Date


2017 at 10:39


Medical Diagnosis:  right DUKE


Onset Date:  2017





Therapy Diagnosis


Therapy Diagnosis:  decreased self care skills





Height/Weight


Height (Feet):  5


Height (Inches):  3.00


Weight (Pounds):  126


Weight (Ounces):  0.0





Weight Bear Status


Location Restriction:  R LE





Medical History


Pertinent Medical History:  Arthritis


Additional Medical History


chronic back pain, knee scope, right ankle tendon injury


Current History


Pt fell at home resulting in right hip fracture. Now s/p right DUKE.


Reviewed History:  Yes





Social History


Home:  Multilevel


Current Living Status:  Alone


Entry Into Home:  Stairs With Railing


 Steps Into Home:  7





ADL-Prior Level of Function


ADL PLOF Comments


Pt reports being independent with self care and mobility prior to 

hospitalization. Used cane for mobility outside of the home. Still drives. Pt 

states daughter vacuums, but she is able to complete other household tasks. Pt 

states she can cook, but prefers not to; mostly eats prepared meals or eats out.


DME/Equipment:  Shower


Drive Self:  Yes





OT Current Status


Subjective


Pt agreeable to therapy this morning. Pt states she has no pain at rest, but 

has right hip pain with movement-does not rate. Pt states she is concerned 

about her diarrhea.





Mental Status/Objective


Patient Orientation:  Person, Place, Situation





Current


Glasses/Contacts:  Yes


Hearing Aids:  No


Dentures/Partials:  No


Hand Dominance:  Right


Upper Extremity ROM


Mildly decreased bilateral shoulder ROM.


Upper Extremity Sensation


Intact per pt report


Upper Extremity Strength


Grossly 3+/5 distally





ADL-Treatment


ADL-Current


Pt declined to attempt shower at this time, but agrees to sponge bath. Pt able 

to bathe bilateral UE, chest, and abdomen. Pt requires assist for all lower 

body bathing. Don pullover shirt with set up. Pt has hip precautions and is 

unable to start Depends or pants over feet. Sit to stand with minimal assistance

, but pt requires assist to pull pants up over bilateral hips. Total assist 

required to don socks. Pt requests to use BSC. Sit to stand with minimal 

assistance and cues to push up from chair. Transfer to BSC with minimal 

assistance. Pt has some anxiety with mobility, states she is fearful of 

falling. Pt requires assist for clothing management and thorough hygiene. 

Transfer back to w/c with minimal assistance. Pt sitting in w/c with PT present 

after session.


              Functional [place] Measure


0=Not Assessed/NA   4=Minimal Assistance


1=Total Assistance   5=Supervision or Setup


2=Maximal Assistance   6=Modified [place]


3=Moderate Assistance   7=Complete IndependenceIRFPAI Quality Coding Scale











6 Independent with activity with or without an assistive device


 


5  Patient requires set up or clean up by helper.  Patient completes activity  

by  themselves


 


4 Supervision or touching assist (CGA). [place] provide cues , steadying assist


 


3 The helper provides less than half the effort to complete the activity


 


2 The helper provides more than half the effort to complete the activity


 


1 Dependent.  The helper does all the effort to complete an activity 


 


7 Patient refused to complete or attempt activity


 


9 The patient did not perform the activity before the current illness or injury


 


88 Not attempted due to Medical conditions or safety concerns








Bathing (FIM):  2


Bathing Location:  L Arm, R Arm, Chest, Abdomen


Shower/Bathe Self (QC):  2


Upper Body Dressing (FIM):  5


Upper Body Dressing (QC):  5


Lower Body Dressing (FIM):  1


Lower Body Dressing (QC):  1


On/Off Footwear (QC):  1


Toileting (FIM):  1


Toileting Hygiene (QC):  1


Toilet/Commode Transfer (FIM):  4


Toilet Transfer (QC):  3





Education


OT Patient Education:  Rehab process


Teaching Recipient:  Patient


Teaching Methods:  Discussion


Response to Teaching:  Verbalize Understanding





OT Short Term Goals


Short Term Goals


Time Frame:  2017


Bathing(FIM):  3


Lower Body Dressing(FIM):  3


Toileting(FIM):  3


Shower Transfer(FIM):  4


Additional Short Term Goals:  1-Demonstrate ADL Tasks, 2-Verbalize Understanding

, 3-ImproveStrength/Ashley


1=Demonstrate adherence to instructed precautions during ADL tasks.


2=Patient will verbalize/demonstrate understanding of assistive devices/

modifications for ADL.


3=Patient will improve strength/tolerance for activity to enable patient to 

perform ADL's.





OT Long Term Goals


Long Term Goals


Time Frame:  2017


Eating (FIM):  6


Eating (QC):  6


Groomin


Oral Hygiene (QC):  6


Bathing(FIM):  5


Shower/Bathe Self (QC):  5


Upper Body Dressing(FIM):  5


Upper Body Dressing (QC):  5


Lower Body Dressing(FIM):  5


Lower Body Dressing (QC):  5


On/Off Footwear (QC):  5


Toileting(FIM):  6


Toileting Hygiene (QC):  6


Toilet/Commode Transfer(FIM):  6


Toilet/Commode Transfer (QC):  6


Shower Transfer(FIM):  5


Additional Goals:  1-Demonstrate ADL Tasks, 2-Verbalize Understanding, 3-

ImproveStrength/Ashley


1=Demonstrate adherence to instructed precautions during ADL tasks.


2=Patient will verbalize/demonstrate understanding of assistive devices/

modifications for ADL.


3=Patient will improve strength/tolerance for activity to enable patient to 

perform ADL's.





OT Education/Plan


Problem List/Assessment


Assessment:  Decreased Activ Tolerance, Decreased UE Strength, Dependent 

Transfers, Impaired Funct Balance, Impaired Self-Care Skills


Pt admitted to ARU after fall with right hip fracture, now s/p right DUKE. Pt 

demonstrates decreased ADL functioning, mobility, strength, and activity 

tolerance. Pt to benefit from skilled OT intervention for ADL training, 

transfers, strengthening, adaptive equipment training, and home safety 

education to maximize level of function and allow safe discharge plan.





Discharge Recommendations


Plan/Recommendations:  Continue POC





Treatment Plan/Plan of Care


Treatment,Training & Education:  Yes


Patient would benefit from OT for education, treatment and training to promote 

independence in ADL's, mobility, safety and/or upper extremity function for ADL'

s.


Plan of Care:  ADL Retraining, Functional Mobility, Group Exercise/Act as Ind, 

UE Funct Exercise/Act


Treatment Duration:  2017


Frequency:  At least 5-7 days/Wk (IRF)


Estimated Hrs Per Day:  1.5 hours per day


Agreement:  Yes


Rehab Potential:  Fair





Time/GCodes


Start Time:  10:30


Stop Time:  11:20


Total Time Billed (hr/min):  50


Billed Treatment Time


1 visit, EVL(15minutes), ADLx2(35minutes)











RADHA HOWARD OT 2017 11:49

## 2017-07-07 NOTE — CONSULTATION
History of Present Illness


History of Present Illness


Patient Consulted On(rubin/time)


7/7/17


 16:27


Date Seen by Provider:  Jul 7, 2017


Time Seen by Provider:  15:35


History of Present Illness


consult requested by Dr. Vogt for Diarrhea








patient is an 82 year old female with right hip fracture s/p repair.  Patient 

has been having chronic diarrhea for approximately one month.  Patient states 

the number of stools will vary per day.  All being typically liquid.  Patient 

states she did have a day or two of constipation which she was given miralax 

and the diarrhea returned.  Patient denies any blood in the stools. She has 

never had a colonoscopy and states she's not planning on having one.  She is 

alert and oriented x 3.  Her pain is controlled.  She is not having any 

abdominal pain.  She's on questran at this time.





Allergies and Home Medications


Allergies


Coded Allergies:  


     nystatin (Verified  Allergy, Mild, HIVES, 7/5/17)


     amoxicillin (Unverified  Allergy, Unknown, 10/16/14)


     chlorhexidine (Verified  Allergy, Unknown, HIVES, 7/5/17)


     clindamycin (Unverified  Allergy, Unknown, 10/16/14)


     sulfamethoxazole (Unverified  Allergy, Unknown, 10/16/14)


     trimethoprim (Unverified  Allergy, Unknown, 10/16/14)





Home Medications


Aspirin 325 Mg Tablet.dr, 325 MG PO DAILY PRN for PAIN-MILD, (Reported)


Loratadine 10 Mg Tablet, 10 MG PO DAILY PRN for ALLERGIES, (Reported)





Past Medical-Social-Family Hx


Patient Social History


Alcohol Use:  Denies Use


Recreational Drug Use:  No


Smoking Status:  Never a Smoker


Recent Foreign Travel:  No


Contact w/Someone Who Travel:  No


Recent Hopitalizations:  No





Seasonal Allergies


Seasonal Allergies:  Yes





Surgeries


HX Surgeries:  Yes (right hemiarthroplasty)


Surgeries:  Adenoidectomy, Breast





Respiratory


Hx Respiratory Disorders:  Yes


Respiratory Disorders:  Pneumonia





Cardiovascular


Hx Cardiac Disorders:  Yes


Cardiac Disorders:  Chronic Edema/Swelling





Neurological


Hx Neurological Disorders:  No





Genitourinary


Hx Genitourinary Disorders:  No





Gastrointestinal


Hx Gastrointestinal Disorders:  Yes (anal fissure)


Gastrointestinal Disorders:  Hemorrhoids, Chronic Diarrhea





Musculoskeletal


Hx Musculoskeletal Disorders:  Yes (chronic neck and back pain)


Musculoskeletal Disorders:  Chronic Back Pain





Endocrine


Hx Endocrine Disorders:  No





HEENT


HX ENT Disorders:  Yes (aerosols cause irritation)


Hearing Impairment:  Hard of Hearing





Cancer


Hx Cancer:  No





Psychosocial


Hx Psychiatric Problems:  No





Blood Transfusions


Hx Blood Disorders:  No





Family Medical History


Significant Family History:  No Pertinent Family Hx





Review of Systems-General


Constitutional:  no symptoms reported


EENTM:  no symptoms reported


Respiratory:  no symptoms reported


Cardiovascular:  no symptoms reported


Gastrointestinal:  see HPI, diarrhea


Genitourinary:  no symptoms reported


Musculoskeletal:  other (s/p right hemiarthroplasty)


Skin:  no symptoms reported


Psychiatric/Neurological:  No Symptoms Reported





Physical Exam-General Problems


Physical Exam


Vital Signs





Vital Sign - Last 12Hours








 7/7/17





 10:30


 


Temp 97.3


 


Pulse 97


 


Resp 18


 


B/P (MAP) 107/52


 


Pulse Ox 100


 


O2 Delivery Room Air





Capillary Refill :


General Appearance:  no apparent distress


HEENT:  normal ENT inspection


Neck:  supple


Respiratory:  no respiratory distress, no accessory muscle use


Cardiovascular:  regular rate, rhythm


Gastrointestinal:  non tender, soft


Rectal:  deferred


Back:  no CVA tenderness


Extremities:  pedal edema (b/l)


Neurologic/Psychiatric:  alert, normal mood/affect, oriented x 3


Skin:  normal color


Lymphatic:  no adenopathy





Assessment/Plan


diarrhea


s/p right hemiarthroplasty


debility in rehab


patient states she's never had a colonoscopy and not wishing to have one. 


I did discuss no doing it now but on a outpatient basis. She states she will 

consider it.


Patient stool cultures and c diff negative would recommend using anti diarrheal 

agents to get under control


will sign off call if needed.











ROBIN SPEARS DO Jul 7, 2017 16:32

## 2017-07-07 NOTE — DISCHARGE SUMMARY-HOSPITALIST
Diagnosis/Chief Complaint


Date of Admission


2017 at 16:25


Date of Discharge


2017 at 10:38


Discharge Date:  2017


Discharge Time:  1200


Admission Diagnosis


Assessment:


Right hip fracture POD # 1 uncomplicated


Chronic diarrhea C. diff negative started on Lactinex and Questran


Hypokalemia


Chronic edema





Discharge Diagnosis


Assessment:


Right hip fracture POD # 2 uncomplicated


Chronic diarrhea C. diff negative started on Lactinex and Questran


Hypokalemia


Chronic edema








Pt doing well.


Diarrhea continues so will maintain the Questran to TID scheduled and consult 

Dr Lipscomb today for endo in future


Pain is controlled


Edema still persists since this is chronic and has not seen a doctor for years 

and likely due to low albumin


Ultram tolerated well


Rehab 





AFVSS, Bowen, up in chair, daughters at the bedside


RRR, CTAB no change in edema lower extremities





Laboratory Tests


17 05:13














Assessment:


Right hip fracture POD # 2 uncomplicated


Chronic diarrhea C. diff negative started on Lactinex and Questran needs 

colonoscopy as outpt since daughters requested that to be done consulting Dr Lipscomb


Hypokalemia replaced at 3.9


Chronic edema


Hyponatremia


Protein malnutrition








Plan:


Lactinex empirically


Rehab tomorrow


Continue yogurt for active cultures


Questran TID scheduled


PT/OT


DVT Px


Dr Lipscomb consultation








Reason Hospital Visit/Course


CC: Medical management following hip fracture





HPI: This is a 82 yoWF pt of Dr. NOÉ Dean presented after fall at home. She 

sustained a right hip fracture that was repaired in an uncomplicated manner by 

Dr. Goodrich last evening and currently there is a pt eval for in-pt rehab.





RN Review:


Pt states that she has had diarrhea for two weeks. Pt has recently been on Cipro


Stool sample was collected.


K+ low, 20 K+ was added to IV and 20 PO as well


Pt is hesitant to take pain meds, like hydros. Pt states that Tylenol does not 

generally help





SW Review:


Pt and her daughters are very nervous in the medical scenario currently


Rehab meets qualification for diagnosis, but if not community nursing center


Pt taking Flagyl from [facility] in ED for negative stool w/u





Patient Interview:


Pt's daughters state that pt does not see Dr. Dean, but is considering seeing 

her as a PCP.


Pt lives at home alone


Pt was talking about her ankle brace with PT upon interview


Pt confirms Dr. Dean as PCP, but has not seen him yet.


Pt states that she was experiencing constipation, then diarrhea.


Pt's daughters state that she had a UTI previously


Physical exam stable. Lungs sound perfect.


Pt states that she does not like yogurt but will eat it.


In pt rehab was discussed


Pt's daughters asked the difference between Culturell or yogurt. They were 

informed that they are similar


I informed her that I will see her tomorrow, and she seemed pleased with this.





Scribed by Roxana Estevez under the direct supervision of Dr. Martinez.








Notes from 17





PT Review:


Pt's edema is concerning





Patient Interview:


Pt states that she has been eating very fibrous foods. Pt was informed that she 

can eat all kinds of food.


Physical exam. Lungs sound perfect


Pt was informed that she will be moved to in-pt rehab. Pt is worried she may 

not be able to do much because of her frequent BMs. Pt wonders if her symptoms 

are due to nervousness and anxiety. This is entirely possible.


Pt asked if she can use OTC Claritin for her allergies. Pt has brought some 

from home. I informed pt that I will put an order in for her to restart this.


Pt's daughter is concerned about her cramping, but I assured her that this is 

to be expected.


Pt's other daughter was concerned about the swelling in her leg. This is not 

concerning at this time.





AFVSS, Pleasant, improved, chronically ill


RRR, CTAB 1+ pitting edema





Plan:


Restart home Claritin


Ambulate


Move to in-pt rehab





Scribed by Roxana Estevez under the direct supervision of Dr. Martinez.





Hospital course: Patient had an uneventful hospital course following a hip 

fracture with uncomplicated repair.  Low albumin continued the lower extremity 

edema and diarrhea that was C. difficile negative was responding slightly to 

Questran and Imodium but overall Dr. Lipscomb will be consulted for endoscopy 

ultimately as an outpatient but she did tolerate Ultram pain medication well 

and was able to be strong enough to go to rehabilitation on Friday, 17. 

Overall prognosis guarded due to poor reserve and advanced age.





Discharge Summary


Discharge Physical Examination


Allergies:  


Coded Allergies:  


     nystatin (Verified  Allergy, Mild, HIVES, 17)


     amoxicillin (Unverified  Allergy, Unknown, 10/16/14)


     chlorhexidine (Verified  Allergy, Unknown, HIVES, 17)


     clindamycin (Unverified  Allergy, Unknown, 10/16/14)


     sulfamethoxazole (Unverified  Allergy, Unknown, 10/16/14)


     trimethoprim (Unverified  Allergy, Unknown, 10/16/14)


Vitals & I&Os





Vital Signs








  Date Time  Temp Pulse Resp B/P (MAP) Pulse Ox O2 Delivery O2 Flow Rate FiO2


 


17 10:31  82 22 109/55 96 Room Air  


 


17 08:00 98.5       


 


17 15:22       2.00 











Hospital Course


Labs (last 24 hrs)


Laboratory Tests


17 05:13: 


White Blood Count 6.6, Red Blood Count 3.12L, Hemoglobin 9.5L, Hematocrit 28L, 

Mean Corpuscular Volume 90, Mean Corpuscular Hemoglobin 30, Mean Corpuscular 

Hemoglobin Concent 34, Red Cell Distribution Width 13.9, Platelet Count 278, 

Mean Platelet Volume 8.7, Neutrophils (%) (Auto) 73, Lymphocytes (%) (Auto) 12, 

Monocytes (%) (Auto) 13H, Eosinophils (%) (Auto) 2, Basophils (%) (Auto) 0, 

Neutrophils # (Auto) 4.8, Lymphocytes # (Auto) 0.8L, Monocytes # (Auto) 0.9, 

Eosinophils # (Auto) 0.1, Basophils # (Auto) 0.0, Sodium Level 134L, Potassium 

Level 4.4, Chloride Level 109H, Carbon Dioxide Level 21, Anion Gap 4L, Blood 

Urea Nitrogen 17, Creatinine 0.85, Estimat Glomerular Filtration Rate > 60, BUN/

Creatinine Ratio 20, Glucose Level 105, Calcium Level 7.9L, Total Bilirubin 0.4

, Aspartate Amino Transf (AST/SGOT) 20, Alanine Aminotransferase (ALT/SGPT) 10, 

Alkaline Phosphatase 73, Total Protein 4.3L, Albumin 1.7L





Microbiology


17 C. difficile GDH Antigen & Toxins - Final, Complete


         


17 MRSA Screen - Final, Complete


         MRSA not isolated





Pending Labs


Laboratory Tests


17 05:13: 


White Blood Count 6.6, Red Blood Count 3.12, Hemoglobin 9.5, Hematocrit 28, 

Mean Corpuscular Volume 90, Mean Corpuscular Hemoglobin 30, Mean Corpuscular 

Hemoglobin Concent 34, Red Cell Distribution Width 13.9, Platelet Count 278, 

Mean Platelet Volume 8.7, Neutrophils (%) (Auto) 73, Lymphocytes (%) (Auto) 12, 

Monocytes (%) (Auto) 13, Eosinophils (%) (Auto) 2, Basophils (%) (Auto) 0, 

Neutrophils # (Auto) 4.8, Lymphocytes # (Auto) 0.8, Monocytes # (Auto) 0.9, 

Eosinophils # (Auto) 0.1, Basophils # (Auto) 0.0, Sodium Level 134, Potassium 

Level 4.4, Chloride Level 109, Carbon Dioxide Level 21, Anion Gap 4, Blood Urea 

Nitrogen 17, Creatinine 0.85, Estimat Glomerular Filtration Rate > 60, BUN/

Creatinine Ratio 20, Glucose Level 105, Calcium Level 7.9, Total Bilirubin 0.4, 

Aspartate Amino Transf (AST/SGOT) 20, Alanine Aminotransferase (ALT/SGPT) 10, 

Alkaline Phosphatase 73, Total Protein 4.3, Albumin 1.7








Discharge


Home Medications:





Active Scripts


Active


Floranex Tablet (L. Acidophilus/Bulgaricus) 1 Each Tablet 1 Tab.chew PO AC 30 

Days


Bisac-Evac (Bisacodyl) 10 Mg Supp.rect 10 Mg FL DAILY PRN 30 Days


Klor-Con M20 (Potassium Chloride) 20 Meq Tab.er.prt 20 Meq PO DAILY@0700 30 Days


Tramadol HCl 50 Mg Tablet  Mg PO Q6H PRN 30 Days


Prevalite Packet (Cholestyramine/Aspartame) 4 Gm Powd.pack 4 Gm PO TID 30 Days


Enoxaparin Sodium 40 Mg/0.4 Ml Syringe 40 Mg SC DAILY 30 Days


Reported


Aspirin EC (Aspirin) 325 Mg Tablet.dr 325 Mg PO DAILY PRN


Claritin (Loratadine) 10 Mg Tablet 10 Mg PO DAILY PRN





Instructions to patient/family


Please see electonic discharge instructions given to patient.





Clinical Quality Measures


DVT/VTE Risk/Contraindication:


Risk Factor Score Per Nursin


RFS Level Per Nursing on Admit:  4+=Very High











RUTH ANN MARTINEZ DO 2017 10:55

## 2017-07-07 NOTE — THERAPY GROUP DAILY NOTE
Therapy Daily Group Note


Exercises


Other





Other/Notes


Pt was an active participant in OT/PT group. She introduced herself to the 

group for socialization and did seated exercises for stress management. She 

played [employer] with a group and was able to roll the dice, add up points, and 

problem-solve what moves to make next. She also was able to participate for the 

entire group time. She was returned to her room for additional therapy.


Start Time:  13:00


Stop Time:  14:15


Total Billed Treatment Time:  75


Total Billed Treatment


visit, 75 minutes group











TEREZA LAMBERT OT Jul 7, 2017 14:34

## 2017-07-07 NOTE — ST COGNITIVE LINGUISTIC EVAL
Speech Evaluation-General


Medical Diagnosis


Right DUKE


Onset Date:  2017





Therapy Diagnosis


Therapy Diagnosis:  Cognitive Lingusitic Skills WNL





Referral


Referring Physician:  Dr. Kayode Andrews


Reason for Referral:  Evaluation/Treatment


Cognitive Evaluation





Medical History


Pertinent Medical History:  Arthritis


Reviewed History:  Yes





Social History


Current Living Status:  Alone





Speech PLF-Current Status


Prior Level of Function





The patient denied recent challenges with her cognition. The patient does


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 My  use to remember


everything  for me, even the names of people we met several times."





Subjective


The patient was recently admitted to [facility] following 

a right DUKE. The patient greeted the clinician appropriately and was agreeable 

to participation in the cognitive assessment on this date.





Language Eval: Auditory


Comprehends Simple Yes/No Ques:  Functional


Indent/Objects Multiple Fields:  Functional


Ident/Pics in Multiple Fields:  Functional


Follows 1-Step Commands:  Functional


Follows Complex Directions:  Functional


Follows General Conversations:  Functional





Language Eval: Verbal Language


Completes Spontaneous Greeting:  Functional


Produces Auto, Serial Info:  Functional


Imitates Simple Words/Phrases:  Functional


Word Finding:  Functional


Requests Basic Needs:  Functional


States Basic Personal Info:  Functional


Expresses Complex Ideas:  Functional





Cognitive


Patient Orientation


The patient is independently oriented to location, city, month, day, date, and 

year.





Objective Cognitive Domain


Attention:  WNL


Memory:  WNL


Problem Solving:  Functional


Executive Functions:  WNL





Objective


Impression


The patient demonstrated cognitive linguistic skills grossly within normal 

limits and appropriate for completion of ADL's.





Communication/Social Cognition


Comprehension:  6 (To note, the patient is hard of stalin. the patient does not 

have hearing aids but is interested in a hearing evaluation upon discharge.)


Expression:  6


Social Interaction:  6


Problem Solvin


Memory:  6





Speech Patient Assess


Expression of Ideas/Wants:  Expression (4)


Understanding Vebal Content:  Understands (4)


Brief Interview-Mental Status:  Yes


Repetition of Three Words:  Three (3)


Temporal Orientation: Year:  Correct (3)


Temporal Orientation: Month:  Accurate within 5 days(2)


Temporal Orientation: Day:  Correct (1)


Recall : Wear to say "Sock":  Yes, no cue required (2)


Recall : Color:  Yes, no cue required (2)


Recall : Bed:  Yes, no cue required (2)





Speech-Plan


Treatment Plan


Speech Therapy Treatment Plan:  Discontinue ST


Evaluation, only.


Frequency:  At least 5-7 days/Wk (IRF) (The patient was provided an evaluation, 

only. Therapy services are not warranted.)


Estimated Hrs Per Day:  .25 hour per day (The patient was provided an evaluation

, only. Therapy services are not warranted. No estimated time will be provided, 

daily.)


Rehab Potential:  Fair





Safety Risks/Education


Teaching Recipient:  Patient


Teaching Methods:  Discussion


Response to Teaching:  Verbalize Understanding


Education Topics Provided:  


Results, Plan of Care, Recommendations





Time


Speech Therapy Time In:  12:45


Speech Therapy Time Out:  13:00


Total Billed Time:  15


Billed Treatment Time


1, CHELSY BALDERAS  2017 13:21

## 2017-07-07 NOTE — DISCHARGE INST-SURGICAL
Discharge Inst-Surgical


Depart Medication/Instructions


Patient Instructions


WBAT on RLE using walker for assistance


daily PT/OT when in rehab


If discharged from rehab to home, arrange [facility] physical therapy 3x/week x 3 weeks


f/u with Dr. Goodrich in 2 1/2 weeks


remove staples 10 days post op then apply steri strips


daily island dressing changes until staples are removed


Continue ASA for DVT prophylaxis


Tramadol and norco as needed for pain


Continue hip precautions


labs per rehab  or Dr. Vogt's recommendations


Final Diagnosis:  traumatic displaced right femoral neck fracture





Consults/Follow Up


Goal/Follow Up Appt.:  


f/u 2 1/2 weeks with Dr. Goodrich


Patient Instructions:  


see above





Activity


Activity as Tolerated:  No





WBAT with walker and staff assist


Walking Assistive Device:  Walker


Elevate Extremity:  Elevate as Instructed


Incentive Spirometry:  Every 2 Hours While Awake





Diet


Discharge Diet:  No Restrictions


Return to The Hospital For:  


concerns


Symptoms to Report to Physicia:  Extremity Discoloration, Numbness/Tingling, 

Swelling Increased, Fever Over 101 Degrees F


If Any Problems/Questions/Issu:  Contact Your Physician





Skin/Wound Care


Infection Signs and Symptoms:  Increased Redness, Foul Odor of Wound, Increased 

Drainage, Temperature Above 101  F


Bathing Instructions:  Shower


Operative Area Clean and Dry:  You May Remove Bandage


Stitches/Staples/Dermabond Dis:  Care of Staples


Ice Pack:  Ice On and Off Site











JOSE ALAMO Jul 7, 2017 08:52

## 2017-07-07 NOTE — PM&R POST ADMISSION ASSESSMENT
Post Admission Physician Asses


The preadmission screen agrees with the post admission assessment that the 

patient is a good candidate for inpatient rehabilitation.





The patient will have a comprehensive program of inpatient rehabilitation with 

a goal of maximizing level of functional independence prior to discharge home 

with [facility].   The patient will have PT/OT ninety minutes per day, each discipline

, five days a week for gait, strengthening, conditioning, balance, ADLs, any 

patient/family/caregiver training as necessary.  Speech therapy to do cognitive 

assessment and treat as indicated. Rehabilitation nursing to assist with bowel, 

bladder, skin, wound care, medication administration, pain management.  Social 

Services to assist with discharge planning, community reentry. SCD's for DVT 

prophylaxis.





She appears to be well motivated to participate in three hours of therapy a 

day.  She should be able to tolerate three hours of therapy a day from a 

medical and surgical standpoint.  She should benefit from the three hours of 

therapy a day.  She has a reasonable discharge plan, reasonable discharge 

rehabilitation goals and a supportive family. She has various comorbidities 

that need to be closely monitored with medications and treatments adjusted on a 

daily basis as needed. 


These include:


Chronic Diarrhea


postop anemia


Hypokalemia


Chronic edema


Hyponatremia





Barriers to discharge for this patient who had been independent prior to this 

are for her to be modified independent to supervision for ADLs and mobility 

skills prior to discharge home with [family], so as to lessen the burden of the 

caregivers. 





Risks for this patient include:


 1.  Fall


 2.  Fracture


 3.  DVT


 4.  Pulmonary embolism


 5.  Wound infection


 6.  Skin breakdown


 7.  Contractures


 8.  Poorly controlled pain


 9.  Urinary retention


10.  UTI


11.  Respiratory infection


12.  Aspiration


13.  recuurent diarrhea with incontinence


14.  Electrolyte abnormality


15.  Worsening anemia





Estimated Length of Stay:  14 days





Prognosis:  Rehab prognosis appears good for goal of discharge home with family 

and [facility] modified independent to supervision for ADLs and mobility skills.











SHIN DEL VALLE MD Jul 7, 2017 13:40

## 2017-07-07 NOTE — PHYSICAL THERAPY EVALUATION
PT Evaluation-General


Medical Diagnosis


Admission Date


2017 at 10:39


Medical Diagnosis:  right DUKE


Onset Date:  2017





Therapy Diagnosis


Therapy Diagnosis:  impaired mobility, strength, endurance





Height/Weight


Height (Feet):  5


Height (Inches):  3.00


Weight (Pounds):  126


Weight (Ounces):  0.0





Weight Bear Status


Weight Bearing Restriction:  Weight Bearing/Tolerated


Location Restriction:  R LE





Referral


Physician:  Darryl


Reason for Referral:  Evaluation/Treatment





Medical History


Pertinent Medical History:  Arthritis


Additional Medical History


chronic edema/swelling, anal fissure, hemorrhoids, chronic diarrhea, chronic 

neck and back pain, [place], surg (adenoidectomy, breast)


Current History


fell at home, came to hospital via EMS, had displaced right femoral neck fx


Reviewed History:  Yes





Social History


Home:  Multilevel


Current Living Status:  Alone


Entry Into Home:  Stairs With Railing


PT Steps Into Home:  4





Prior/Core FIM


Prior Level of Function


              Functional [place] Measure


0=Not Assessed/NA   4=Minimal Assistance


1=Total Assistance   5=Supervision or Setup


2=Maximal Assistance   6=Modified [place]


3=Moderate Assistance   7=Complete [place]


Bed Mobility:  6


Transfers (B,C,W/C) (FIM):  6


Gait:  6


Patient states she was ambulating using  a single point cane





PT Evaluation-Current


Subjective


Patient in wheelchair pre tx, just got through with OT.  She states she has no 

pain at rest but does with activity.





Pt/Family Goals


to be independent at home





Objective


Patient Orientation:  Person, Place, Situation





ROM/Strength


ROM Lower Extremities


WNL left leg, right leg not tested


Strenght Lower Extremities


NT due to pain and recent surgery





Neuromuscular


(Tone, Coordination, Reflexes)


WNL





Sensory


Vision:  Functional


Hearing:  Functional


Sensation Right Lower Extremit:  Intact


Sensation Left Lower Extremity:  Intact





Transfers


              Functional [place] Measure


0=Not Assessed/NA   4=Minimal Assistance


1=Total Assistance   5=Supervision or Setup


2=Maximal Assistance   6=Modified [place]


3=Moderate Assistance   7=Complete IndependenceIRFPAI Quality Coding Scale











6 Independent with activity with or without an assistive device


 


5  Patient requires set up or clean up by helper.  Patient completes activity  

by  themselves


 


4 Supervision or touching assist (CGA). [place] provide cues , steadying assist


 


3 The helper provides less than half the effort to complete the activity


 


2 The helper provides more than half the effort to complete the activity


 


1 Dependent.  The helper does all the effort to complete an activity 


 


7 Patient refused to complete or attempt activity


 


9 The patient did not perform the activity before the current illness or injury


 


88 Not attempted due to Medical conditions or safety concerns








Transfers (B, C, W/C) (FIM):  2


Scootin


Rollin


Roll Left to Right (QC):  3


Supine to/from Sit:  2


Sit to/from Stand:  4


bed t/f WC(FIM only if WC use):  4


Sit to Lying (QC):  2


Lying to Sitting/Side of Bed(Q:  2


Sit to Stand (QC):  3


Chair/Bed-to-Chair Xfer(QC):  4


Patient performs bed mobility with min assist except for supine <-> sit which 

is max assist, she performs sit to stand with min assist and stand pivot 

transfer with CGA, cues for safety and hand placement





Gait


Does the Patient Walk?:  Yes


Mode of Locomotion:  Walk


Anticipated Mode of Locomotion:  Walk


Gait (FIM):  2


Walk 10 feet (QC):  4


Walk 50 ft with 2 Turns(QC):  4


Walk 150 ft (QC):  88


Walking 10ft/uneven surface-QC:  4


Distance:  100', 50'


Gait Level of Assist:  4


Gait Persons Needed:  1


Gait Assistive Device:  FWW


Comments/Gait Description


Patient can ambulate 100' with a rolling walker with CGA, including 50' with at 

least 2 turns of 90 degrees and 10' over an uneven surface.  She keeps her 

right leg pretty stiff, knee slightly flexed.





Wheelchair Training


Does the Pt Use a Wheelchair?:  Yes


Wheelchair (FIM):  2


Distance:  50'


Wheelchair Level of Assist:  5


Wheel 50 ft with 2 turns (QC):  4


Wheel 150 ft (QC):  88


Type of Wheelchair:  Manual





Stairs


Stairs (FIM):  1


#of Steps:  1


Level of Assist:  4


1 Step (curb) (QC):  3


4 Steps (QC):  88


Assistive Device:  Walker


12 Steps (QC):  88


Patient can go up and down 1 step using a rolling walker with min assist.  Cues 

for safety and foot placement.





Balance


Sitting Static:  Normal


Sitting Dynamic:  Normal


Standing Static:  Fair


 Standing Dynamic:  Fair





Assessment/Needs


Patient has impaired mobility, strength, endurance post right DUKE.


Rehab Potential:  Fair





PT Short Term Goals


Short Term Goals


Time Frame:  2017


Transfers (B,C,W/C) (FIM):  4


Gait (FIM):  4


Gait Distance Comment:  150'


Gait Level of Assist:  4


Gait Assistive Device:  FWW





PT Long Term Goals


Long Term Goals


PT Long Term Goals Time Frame:  2017


Transfers (B,C,W/C) (FIM):  5


Sit to Lying (QC):  4


Lying-Sitting on Side/Bed(QC):  4


Sit to Stand (QC):  4


Rollin


Roll Left to Right (QC):  4


Chair/Bed-to-Chair Xfer(QC):  4


Gait (FIM):  5


Distance:  200'


Walk 10 feet (QC):  4


Walk 10ft-Uneven Surface(QC):  4


Walk 50ft with 2 Turns (QC):  4


Walk 150 ft (QC):  4


Gait Level of Assist:  5


Gait Assistive Device:  FWW


Stairs (FIM):  2


# of Steps:  4


1 Step (curb) (QC):  4


4 Steps (QC):  4


12 Steps (QC):  88


Stairs Level Of Assist:  4





PT Plan


Problem List


Problem List:  Activity Tolerance, Functional Strength, Safety, Balance, Gait, 

Transfer, Bed Mobility, ROM





Treatment/Plan


Treatment Plan:  Continue Plan of Care


Treatment Plan:  Bed Mobility, Education, Functional Activity Ashley, Functional 

Strength, Group Therapy, Gait, Safety, Therapeutic Exercise, Transfers


Treatment Duration:  2017


Frequency:  At least 5-7 days/Wk (IRF)


Estimated Hrs Per Day:  1.5 hours per day


Patient and/or Family Agrees t:  Yes





Safety Risks/Education


Patient Education:  Gait Training, Transfer Techniques, Steps, Correct 

Positioning, W/C Management, Safety Issues


Teaching Recipient:  Patient


Teaching Methods:  Demonstration, Discussion


Response to Teaching:  Reinforcement Needed





Discharge Recommendations


Plan


Patient will perform bed mobility and transfer training, balance and endurance 

training, functional strengthening, stair training, gait training, and education

, to improve functional mobility and independence at home.


Therapy D/C Recommendations:  Home w/ Family Support





Time/GCodes


Time In:  1120


Time Out:  1200


Total Billed Treatment Time:  40


Total Billed Treatment


1 visit


EVL 15'


GT 15'


FA 10'











YESSICA ESPANA PT 2017 11:58

## 2017-07-07 NOTE — OCCUPATIONAL THER DAILY NOTE
OT Current Status-Daily Note


Subjective


Pt agreeable to treatment.





Mental Status/Objective


              Functional [place] Measure


0=Not Assessed/NA   4=Minimal Assistance


1=Total Assistance   5=Supervision or Setup


2=Maximal Assistance   6=Modified [place]


3=Moderate Assistance   7=Complete [place]





ADL-Treatment


Pt completed grooming tasks while seated at sink. Pt brushed teeth, washed hands

, and combed hair with set up while seated. Pt transferred w/c to EOB with 

minimal assistance. Sit to supine with assist for LE. Pt then states she needs 

to use the BSC. Supine to sit with assist for trunk. Sit to stand and transfer 

to BSC with minimal assistance using FWW. Pt required assist to pull pants 

down. Pt states she needs to sit on commode for a while, will call when she is 

done. Pt sitting on BSC with call light in reach after session, nursing 

notified.


              Functional [place] Measure


0=Not Assessed/NA   4=Minimal Assistance


1=Total Assistance   5=Supervision or Setup


2=Maximal Assistance   6=Modified [place]


3=Moderate Assistance   7=Complete IndependenceIRFPAI Quality Coding Scale











6 Independent with activity with or without an assistive device


 


5  Patient requires set up or clean up by helper.  Patient completes activity  

by  themselves


 


4 Supervision or touching assist (CGA). [place] provide cues , steadying assist


 


3 The helper provides less than half the effort to complete the activity


 


2 The helper provides more than half the effort to complete the activity


 


1 Dependent.  The helper does all the effort to complete an activity 


 


7 Patient refused to complete or attempt activity


 


9 The patient did not perform the activity before the current illness or injury


 


88 Not attempted due to Medical conditions or safety concerns








Grooming (FIM):  5


Oral Hygiene (QC):  5


Toilet/Commode Transfer (FIM):  4





OT Short Term Goals


Short Term Goals


Time Frame:  2017


Bathing(FIM):  3


Lower Body Dressing(FIM):  3


Toileting(FIM):  3


Transfers (B,C,W/C) (FIM):  4


Shower Transfer(FIM):  4


Additional Short Term Goals:  1-Demonstrate ADL Tasks, 2-Verbalize Understanding

, 3-ImproveStrength/Ashley


1=Demonstrate adherence to instructed precautions during ADL tasks.


2=Patient will verbalize/demonstrate understanding of assistive devices/

modifications for ADL.


3=Patient will improve strength/tolerance for activity to enable patient to 

perform ADL's.





OT Long Term Goals


Long Term Goals


Time Frame:  2017


Eating (FIM):  6


Eating (QC):  6


Groomin


Oral Hygiene (QC):  6


Bathing(FIM):  5


Shower/Bathe Self (QC):  5


Upper Body Dressing(FIM):  5


Upper Body Dressing (QC):  5


Lower Body Dressing(FIM):  5


Lower Body Dressing (QC):  5


On/Off Footwear (QC):  5


Toileting(FIM):  6


Toileting Hygiene (QC):  6


Toilet/Commode Transfer(FIM):  6


Toilet/Commode Transfer (QC):  6


Shower Transfer(FIM):  5


Additional Goals:  1-Demonstrate ADL Tasks, 2-Verbalize Understanding, 3-

ImproveStrength/Ashley


1=Demonstrate adherence to instructed precautions during ADL tasks.


2=Patient will verbalize/demonstrate understanding of assistive devices/

modifications for ADL.


3=Patient will improve strength/tolerance for activity to enable patient to 

perform ADL's.





OT Education/Plan


Discharge Recommendations


Plan/Recommendations:  Continue POC





Treatment Plan/Plan of Care


Patient would benefit from OT for education, treatment and training to promote 

independence in ADL's, mobility, safety and/or upper extremity function for ADL'

s.


Plan of Care:  ADL Retraining, Functional Mobility, Group Exercise/Act as Ind, 

UE Funct Exercise/Act


Treatment Duration:  2017


Frequency:  At least 5-7 days/Wk (IRF)


Estimated Hrs Per Day:  1.5 hours per day


Agreement:  Yes


Rehab Potential:  Fair





Time/GCodes


Start Time:  14:15


Stop Time:  14:30


Total Time Billed (hr/min):  15


Billed Treatment Time


1 visit, ADL(15minutes)











RADHA HOWARD OT 2017 14:39

## 2017-07-08 VITALS — DIASTOLIC BLOOD PRESSURE: 68 MMHG | SYSTOLIC BLOOD PRESSURE: 115 MMHG

## 2017-07-08 VITALS — SYSTOLIC BLOOD PRESSURE: 102 MMHG | DIASTOLIC BLOOD PRESSURE: 68 MMHG

## 2017-07-08 VITALS — SYSTOLIC BLOOD PRESSURE: 118 MMHG | DIASTOLIC BLOOD PRESSURE: 66 MMHG

## 2017-07-08 RX ADMIN — POTASSIUM CHLORIDE SCH MEQ: 1500 TABLET, EXTENDED RELEASE ORAL at 06:15

## 2017-07-08 RX ADMIN — LACTOBACILLUS TAB SCH TAB.CHEW: TAB at 16:37

## 2017-07-08 RX ADMIN — CHOLESTYRAMINE SCH GM: 4 POWDER, FOR SUSPENSION ORAL at 13:42

## 2017-07-08 RX ADMIN — ENOXAPARIN SODIUM SCH MG: 100 INJECTION SUBCUTANEOUS at 08:57

## 2017-07-08 RX ADMIN — ASPIRIN SCH MG: 325 TABLET, COATED ORAL at 08:56

## 2017-07-08 RX ADMIN — LACTOBACILLUS TAB SCH TAB.CHEW: TAB at 12:57

## 2017-07-08 RX ADMIN — LACTOBACILLUS TAB SCH TAB.CHEW: TAB at 06:15

## 2017-07-08 RX ADMIN — CHOLESTYRAMINE SCH GM: 4 POWDER, FOR SUSPENSION ORAL at 20:18

## 2017-07-08 RX ADMIN — LORATADINE SCH MG: 10 TABLET ORAL at 08:56

## 2017-07-08 RX ADMIN — CHOLESTYRAMINE SCH GM: 4 POWDER, FOR SUSPENSION ORAL at 08:56

## 2017-07-08 NOTE — PHYSICAL THERAPY DAILY NOTE
PT Daily Note-Current


Subjective


Patient agrees to PT.  Patient continues to c/o loose stools.  Nursing is aware.





Pain





   Numeric Pain Scale:  5-Moderate Pain


   Location:  Right


   Location Body Site:  Hip


   Pain Description:  Acute





Appearance


bilateral LE edema





Mental Status


Patient Orientation:  Normal For Age





Transfers


              Functional [place] Measure


0=Not Assessed/NA   4=Minimal Assistance


1=Total Assistance   5=Supervision or Setup


2=Maximal Assistance   6=Modified [place]


3=Moderate Assistance   7=Complete IndependenceIRFPAI Quality Coding Scale











6 Independent with activity with or without an assistive device


 


5  Patient requires set up or clean up by helper.  Patient completes activity  

by  themselves


 


4 Supervision or touching assist (CGA). [place] provide cues , steadying assist


 


3 The helper provides less than half the effort to complete the activity


 


2 The helper provides more than half the effort to complete the activity


 


1 Dependent.  The helper does all the effort to complete an activity 


 


7 Patient refused to complete or attempt activity


 


9 The patient did not perform the activity before the current illness or injury


 


88 Not attempted due to Medical conditions or safety concerns








Transfers (B, C, W/C) (FIM):  5


Scootin


Sit to/from Stand:  5


Sit to Stand (QC):  5





Weight Bearing


Weight Bearing Restriction:  Weight Bearing/Tolerated


Location Restriction:  R LE





Gait Training


Does the Patient Walk?:  Yes


Gait (FIM):  5


Distance (FIM):  3=150 ft


Distance:  150' x 2


Walk 10 feet (QC):  5


Walk 50 ft with 2 Turns(QC):  5


Walk 150 ft (QC):  5


Gait Level of Assist:  5


Gait Persons Needed:  1


Gait Assistive Device:  FWW


step to gait sequence with skilled verbal instruction to attempt reciprocal 

pattern.  Patient has difficulty due to pain.





Exercises


Seated Therapy Exercises:  Ankle pumps, Long arc quads


Seated Reps:  20 (3 sets)





Assessment


Patient is progressing with treatment plan by demonstrating improvement with 

transfers and gait.  Exercises and gait training performed to facility the 

improvement of functional mobility to return safely to home.





PT Short Term Goals


Short Term Goals


Time Frame:  2017


Transfers (B,C,W/C) (FIM):  4


Gait (FIM):  4


Gait Distance Comment:  150'


Gait Level of Assist:  4


Gait Assistive Device:  FWW


Wheelchair Distance:  50'





PT Long Term Goals


Long Term Goals


PT Long Term Goals Time Frame:  2017


Transfers (B,C,W/C) (FIM):  5


Sit to Lying (QC):  4


Lying-Sitting on Side/Bed(QC):  4


Sit to Stand (QC):  4


Rollin


Roll Left to Right (QC):  4


Chair/Bed-to-Chair Xfer(QC):  4


Gait (FIM):  5


Distance:  200'


Walk 10 feet (QC):  4


Walk 10ft-Uneven Surface(QC):  4


Walk 50ft with 2 Turns (QC):  4


Walk 150 ft (QC):  4


Gait Level of Assist:  5


Gait Assistive Device:  FWW


Stairs (FIM):  2


# of Steps:  4


1 Step (curb) (QC):  4


4 Steps (QC):  4


12 Steps (QC):  88


Stairs Level Of Assist:  4





PT Plan


Treatment/Plan


Treatment Plan:  Continue Plan of Care


Treatment Plan:  Bed Mobility, Education, Functional Activity Ashley, Functional 

Strength, Group Therapy, Gait, Safety, Therapeutic Exercise, Transfers


Treatment Duration:  2017


Frequency:  At least 5-7 days/Wk (IRF)


Estimated Hrs Per Day:  1.5 hours per day


Patient and/or Family Agrees t:  Yes





Time/GCodes


Time In:  935


Time Out:  958


Total Billed Treatment Time:  23


Total Billed Treatment


1 visit


EX 10 min


GT 13 min











TRAMAINE WILLIAM PT 2017 10:02

## 2017-07-09 VITALS — DIASTOLIC BLOOD PRESSURE: 61 MMHG | SYSTOLIC BLOOD PRESSURE: 123 MMHG

## 2017-07-09 VITALS — SYSTOLIC BLOOD PRESSURE: 129 MMHG | DIASTOLIC BLOOD PRESSURE: 69 MMHG

## 2017-07-09 RX ADMIN — LORATADINE SCH MG: 10 TABLET ORAL at 09:09

## 2017-07-09 RX ADMIN — CHOLESTYRAMINE SCH GM: 4 POWDER, FOR SUSPENSION ORAL at 13:09

## 2017-07-09 RX ADMIN — CHOLESTYRAMINE SCH GM: 4 POWDER, FOR SUSPENSION ORAL at 09:10

## 2017-07-09 RX ADMIN — LACTOBACILLUS TAB SCH TAB.CHEW: TAB at 17:41

## 2017-07-09 RX ADMIN — CHOLESTYRAMINE SCH GM: 4 POWDER, FOR SUSPENSION ORAL at 06:55

## 2017-07-09 RX ADMIN — DIPHENOXYLATE HYDROCHLORIDE AND ATROPINE SULFATE PRN EA: 2.5; .025 TABLET ORAL at 11:30

## 2017-07-09 RX ADMIN — ASPIRIN SCH MG: 325 TABLET, COATED ORAL at 09:09

## 2017-07-09 RX ADMIN — CHOLESTYRAMINE SCH GM: 4 POWDER, FOR SUSPENSION ORAL at 20:46

## 2017-07-09 RX ADMIN — CHOLESTYRAMINE SCH GM: 4 POWDER, FOR SUSPENSION ORAL at 17:41

## 2017-07-09 RX ADMIN — ENOXAPARIN SODIUM SCH MG: 100 INJECTION SUBCUTANEOUS at 09:10

## 2017-07-09 RX ADMIN — LACTOBACILLUS TAB SCH TAB.CHEW: TAB at 11:13

## 2017-07-09 RX ADMIN — POTASSIUM CHLORIDE SCH MEQ: 1500 TABLET, EXTENDED RELEASE ORAL at 06:27

## 2017-07-09 RX ADMIN — LACTOBACILLUS TAB SCH TAB.CHEW: TAB at 06:27

## 2017-07-10 VITALS — SYSTOLIC BLOOD PRESSURE: 120 MMHG | DIASTOLIC BLOOD PRESSURE: 57 MMHG

## 2017-07-10 VITALS — SYSTOLIC BLOOD PRESSURE: 115 MMHG | DIASTOLIC BLOOD PRESSURE: 62 MMHG

## 2017-07-10 RX ADMIN — CHOLESTYRAMINE SCH GM: 4 POWDER, FOR SUSPENSION ORAL at 12:48

## 2017-07-10 RX ADMIN — POTASSIUM CHLORIDE SCH MEQ: 1500 TABLET, EXTENDED RELEASE ORAL at 06:22

## 2017-07-10 RX ADMIN — ASPIRIN SCH MG: 325 TABLET, COATED ORAL at 06:22

## 2017-07-10 RX ADMIN — CHOLESTYRAMINE SCH GM: 4 POWDER, FOR SUSPENSION ORAL at 21:08

## 2017-07-10 RX ADMIN — DIPHENOXYLATE HYDROCHLORIDE AND ATROPINE SULFATE PRN EA: 2.5; .025 TABLET ORAL at 12:51

## 2017-07-10 RX ADMIN — LORATADINE SCH MG: 10 TABLET ORAL at 06:22

## 2017-07-10 RX ADMIN — DIPHENOXYLATE HYDROCHLORIDE AND ATROPINE SULFATE PRN EA: 2.5; .025 TABLET ORAL at 21:04

## 2017-07-10 RX ADMIN — CHOLESTYRAMINE SCH GM: 4 POWDER, FOR SUSPENSION ORAL at 11:21

## 2017-07-10 RX ADMIN — DIPHENOXYLATE HYDROCHLORIDE AND ATROPINE SULFATE PRN EA: 2.5; .025 TABLET ORAL at 03:29

## 2017-07-10 RX ADMIN — LACTOBACILLUS TAB SCH TAB.CHEW: TAB at 11:21

## 2017-07-10 RX ADMIN — CHOLESTYRAMINE SCH GM: 4 POWDER, FOR SUSPENSION ORAL at 07:26

## 2017-07-10 RX ADMIN — CHOLESTYRAMINE SCH GM: 4 POWDER, FOR SUSPENSION ORAL at 15:32

## 2017-07-10 RX ADMIN — DIPHENOXYLATE HYDROCHLORIDE AND ATROPINE SULFATE PRN EA: 2.5; .025 TABLET ORAL at 11:21

## 2017-07-10 RX ADMIN — LACTOBACILLUS TAB SCH TAB.CHEW: TAB at 15:32

## 2017-07-10 RX ADMIN — ENOXAPARIN SODIUM SCH MG: 100 INJECTION SUBCUTANEOUS at 08:10

## 2017-07-10 RX ADMIN — LACTOBACILLUS TAB SCH TAB.CHEW: TAB at 06:22

## 2017-07-10 NOTE — PHYSICAL THERAPY DAILY NOTE
PT Daily Note-Current


Subjective


Pt. states she is fearful and is having anxiety about this whole thing and did 

not sleep last night.  This PTA assured pt that was making great progress and 

she was then ready to continue with more confidence





Pain





   Numeric Pain Scale:  5-Moderate Pain (right)


   Location:  Right


   Location Body Site:  Hip


   Pain Description:  Ache





Mental Status


Patient Orientation:  Normal For Age





Transfers


              Functional [place] Measure


0=Not Assessed/NA   4=Minimal Assistance


1=Total Assistance   5=Supervision or Setup


2=Maximal Assistance   6=Modified [place]


3=Moderate Assistance   7=Complete IndependenceIRFPAI Quality Coding Scale











6 Independent with activity with or without an assistive device


 


5  Patient requires set up or clean up by helper.  Patient completes activity  

by  themselves


 


4 Supervision or touching assist (CGA). [place] provide cues , steadying assist


 


3 The helper provides less than half the effort to complete the activity


 


2 The helper provides more than half the effort to complete the activity


 


1 Dependent.  The helper does all the effort to complete an activity 


 


7 Patient refused to complete or attempt activity


 


9 The patient did not perform the activity before the current illness or injury


 


88 Not attempted due to Medical conditions or safety concerns








Transfers (B, C, W/C) (FIM):  4


Scootin


Rollin


Supine to/from Sit:  4


Sit to/from Stand:  5


Bed to/from Chair:  4





Weight Bearing


Weight Bearing Restriction:  Weight Bearing/Tolerated





Gait Training


Does the Patient Walk?:  Yes


Gait (FIM):  4


Distance (FIM):  3=150 ft (2)


Gait Level of Assist:  4


Gait Persons Needed:  1


Gait Assistive Device:  FWW


step to pattern





Exercises


Supine Ex:  Bridging (to scoot side to side), Ankle pumps, Quad Set, Rolling, 

Glut sets, Heel Slides, Short Arc Quads, Scooting, Straight leg raise (x5 indep)

, Hip abd/add


Supine Reps:  15





Treatments


toileted with assist to clean and pull pants up down





Assessment


Current Status:  Good Progress


increased TRF skills as well as gait skills





PT Short Term Goals


Short Term Goals


Time Frame:  2017


Transfers (B,C,W/C) (FIM):  4


Gait (FIM):  4


Gait Distance Comment:  150'


Gait Level of Assist:  4


Gait Assistive Device:  FWW


Wheelchair Distance:  50'





PT Long Term Goals


Long Term Goals


PT Long Term Goals Time Frame:  2017


Transfers (B,C,W/C) (FIM):  5


Sit to Lying (QC):  4


Lying-Sitting on Side/Bed(QC):  4


Sit to Stand (QC):  4


Rollin


Roll Left to Right (QC):  4


Chair/Bed-to-Chair Xfer(QC):  4


Gait (FIM):  5


Distance:  200'


Walk 10 feet (QC):  4


Walk 10ft-Uneven Surface(QC):  4


Walk 50ft with 2 Turns (QC):  4


Walk 150 ft (QC):  4


Gait Level of Assist:  5


Gait Assistive Device:  FWW


Stairs (FIM):  2


# of Steps:  4


1 Step (curb) (QC):  4


4 Steps (QC):  4


12 Steps (QC):  88


Stairs Level Of Assist:  4





PT Plan


Treatment/Plan


Treatment Plan:  Continue Plan of Care


Treatment Plan:  Bed Mobility, Education, Functional Activity Ashley, Functional 

Strength, Group Therapy, Gait, Safety, Therapeutic Exercise, Transfers


Treatment Duration:  2017


Frequency:  At least 5-7 days/Wk (IRF)


Estimated Hrs Per Day:  1.5 hours per day


Patient and/or Family Agrees t:  Yes





Safety Risks/Education


Patient Education:  Gait Training, Transfer Techniques, Correct Positioning, 

Safety Issues


Teaching Recipient:  Patient


Teaching Methods:  Demonstration, Discussion


Response to Teaching:  Verbalize Understanding, Return Demonstration, 

Reinforcement Needed





Time/GCodes


Time In:  800


Time Out:  900


Total Billed Treatment Time:  60


Total Billed Treatment


1,EX20m,GT25m,FA15m


G Codes Necessary:  DANIELLA Torres PTA Jul 10, 2017 09:07

## 2017-07-10 NOTE — INDIVIDUALIZED PLAN OF CARE
Individualized Plan of Care


Rehab Nursing IPOC Order


Admission Date


Jul 7, 2017 at 10:39


Current Orders





Orders


Ondansetron  Oral Dissolve Tab (Zofran (7/9/17 16:15)


Abdomen/[place] (7/10/17 09:26)


Patient Visit (7/10/17 )


Exercise Therap, Ea 15 Min (7/10/17 )


Gait Training, Ea 15 Min (7/10/17 )


Functional Activities, Ea 15 (7/10/17 )


Lorazepam Tablet (Ativan Tablet) (7/10/17 15:15)


Rehab Nursing Orders:  Diseage Management, Edu in Press Rel Techn, Hydration 

Management, Nutrition Management, Pain Management


Other Nursing Orders:  as per ordres for diarrhea





PT IPOC


Problem List:  Activity Tolerance, Functional Strength, Safety, Balance, Gait, 

Transfer, Bed Mobility, ROM


Treatment Plan:  Continue Plan of Care


Bed Mobility, Education, Functional Activity Ashley, Functional Strength, Group 

Therapy, Gait, Safety, Therapeutic Exercise, Transfers


Treatment Duration:  Jul 28, 2017


Frequency:  Twice Daily


Estimated Hrs Per Day:  1.5 hours per day





OT IPOC


Problems:  Decreased Activ Tolerance, Decreased UE Strength, Dependent Transfers

, Impaired Funct Balance, Impaired Self-Care Skills


Plan of Care:  ADL Retraining, Functional Mobility, Group Exercise/Act as Ind, 

UE Funct Exercise/Act


Treatment Duration:  Jul 28, 2017


Frequency:  Twice Daily


Estimated Hrs Per Day:  1.5 hours per day





ST IPOC


Speech Therapy Treatment Plan:  Discontinue ST


Frequency:  At least 5-7 days/Wk (IRF) (The patient was provided an evaluation, 

only. Therapy services are not warranted.)


Estimated Hrs Per Day:  .25 hour per day (The patient was provided an evaluation

, only. Therapy services are not warranted. No estimated time will be provided, 

daily.)





Physician IPOC


Medical Issues being managed closely and that require the 24 hour availability 

of a physician: pain management,Management of diarrhea and postop hyponatremia


Medical Issues:  Bowel/Bladder Function, DVT Prophylaxis, Falls Precautions, 

Fluid/Electrolyte/Nutrition Balance, Infection Protection, Pain Management, 

Wound Care, Other (List) (as per above)


Brief Synthesis of Preadmission Screen, Post-Admission Evaluation, and Therapy 


Evaluations: 83 yo female who fell at home and sustained a rt hip frx with 

repair with ortho dr Goodrich 7/4/17 [address] [facility] Has chronic 

diarrhea and RX being given for that Declined a possible Colonoscopy with 

Surgery Had been Independent prior to this PCP DR molina Also found to have 

hyponatremia. Hospitalist service following patient


Medical Prognosis:  good


Anticipated Length of Stay:  2 weeks


Rehab Goals


Modified Independent for adls and mobility skills  and continence of bowel and 

bladder


Anticipated discharge destinat:  Home with [facility]











SHIN DEL VALLE MD Jul 10, 2017 15:29

## 2017-07-10 NOTE — OCCUPATIONAL THER DAILY NOTE
OT Current Status-Daily Note


Subjective


Pt agrees to treatment this am. Pt states she has no pain at rest, but has pain 

in right hip with movement-not rated.





Mental Status/Objective


              Functional [place] Measure


0=Not Assessed/NA   4=Minimal Assistance


1=Total Assistance   5=Supervision or Setup


2=Maximal Assistance   6=Modified [place]


3=Moderate Assistance   7=Complete [place]





ADL-Treatment


Pt sitting on [facility] when therapist arrives. Pt requires assist for hygiene and to 

pull Depends up. Pt states she is anxious about showering, but agrees to 

attempt. Transfer to walk in shower with minimal assistance using grab bars for 

safety. Pt doffed hospital gown with SBA. Used dressing stick to doff Depends 

and socks with moderate assistance and cues for technique. Seated bathing 

completed using hand held shower and long handled sponge. Pt able to wash 

bilateral UE, chest, abdomen, destiny area, and bilateral upper legs. Pt uses long 

sponge to wash lower legs and feet, but requires assist to dry after shower. Pt 

requires assist to wash/dry buttocks. Don pullover gown with set up. 

Instruction provided regarding use of adaptive equipment for LE dressing. Pt 

required max assist to don Depends. Sit to stand with minimal assistance for 

pant hike, using FWW for balance. Grooming tasks completed seated at sink. Pt 

flossed, brushed teeth, and combed hair with set up. Pt transferred to [facility] two 

more times during session with minimal assistance. Pt able to pull pants down, 

but required assist for hygiene and to pull pants up. Pt moves slowly and 

requires increased time for ADLs. Occasional rest breaks secondary to fatigue. 

Pt requests to return to bed after session. Sit to supine with minimal 

assistance. Pt in bed with needs met after session.


              Functional [place] Measure


0=Not Assessed/NA   4=Minimal Assistance


1=Total Assistance   5=Supervision or Setup


2=Maximal Assistance   6=Modified [place]


3=Moderate Assistance   7=Complete IndependenceIRFPAI Quality Coding Scale











6 Independent with activity with or without an assistive device


 


5  Patient requires set up or clean up by helper.  Patient completes activity  

by  themselves


 


4 Supervision or touching assist (CGA). [place] provide cues , steadying assist


 


3 The helper provides less than half the effort to complete the activity


 


2 The helper provides more than half the effort to complete the activity


 


1 Dependent.  The helper does all the effort to complete an activity 


 


7 Patient refused to complete or attempt activity


 


9 The patient did not perform the activity before the current illness or injury


 


88 Not attempted due to Medical conditions or safety concerns








Grooming (FIM):  5


Oral Hygiene (QC):  5


Bathing (FIM):  3


Shower/Bathe Self (QC):  3


Upper Body (FIM):  5


Upper Body Dressing (QC):  5


Lower Body Dressing (FIM):  2


Lower Body Dressing (QC):  2


Toileting (FIM):  2


Toileting Hygiene (QC):  2


Toilet/Commode Transfer (FIM):  4


Toilet Transfer (QC):  3


Shower Transfer(FIM):  4





Education


OT Patient Education:  Modified ADL techniques


Teaching Recipient:  Patient


Teaching Methods:  Demonstration, Discussion


Response to Teaching:  Reinforcement Needed





OT Short Term Goals


Short Term Goals


Time Frame:  2017


Bathing(FIM):  3


Lower Body Dressing(FIM):  3


Toileting(FIM):  3


Transfers (B,C,W/C) (FIM):  4


Shower Transfer(FIM):  4


Additional Short Term Goals:  1-Demonstrate ADL Tasks, 2-Verbalize Understanding

, 3-ImproveStrength/Ashley


1=Demonstrate adherence to instructed precautions during ADL tasks.


2=Patient will verbalize/demonstrate understanding of assistive devices/

modifications for ADL.


3=Patient will improve strength/tolerance for activity to enable patient to 

perform ADL's.





OT Long Term Goals


Long Term Goals


Time Frame:  2017


Eating (FIM):  6


Eating (QC):  6


Groomin


Oral Hygiene (QC):  6


Bathing(FIM):  5


Shower/Bathe Self (QC):  5


Upper Body Dressing(FIM):  5


Upper Body Dressing (QC):  5


Lower Body Dressing(FIM):  5


Lower Body Dressing (QC):  5


On/Off Footwear (QC):  5


Toileting(FIM):  6


Toileting Hygiene (QC):  6


Toilet/Commode Transfer(FIM):  6


Toilet/Commode Transfer (QC):  6


Shower Transfer(FIM):  5


Additional Goals:  1-Demonstrate ADL Tasks, 2-Verbalize Understanding, 3-

ImproveStrength/Ashley


1=Demonstrate adherence to instructed precautions during ADL tasks.


2=Patient will verbalize/demonstrate understanding of assistive devices/

modifications for ADL.


3=Patient will improve strength/tolerance for activity to enable patient to 

perform ADL's.





OT Education/Plan


Discharge Recommendations


Plan/Recommendations:  Continue POC





Treatment Plan/Plan of Care


Patient would benefit from OT for education, treatment and training to promote 

independence in ADL's, mobility, safety and/or upper extremity function for ADL'

s.


Plan of Care:  ADL Retraining, Functional Mobility, Group Exercise/Act as Ind, 

UE Funct Exercise/Act


Treatment Duration:  2017


Frequency:  At least 5-7 days/Wk (IRF)


Estimated Hrs Per Day:  1.5 hours per day


Agreement:  Yes


Rehab Potential:  Fair





Time/GCodes


Start Time:  09:00


Stop Time:  10:30


Total Time Billed (hr/min):  90


Billed Treatment Time


1 visit, ADLx6(90minutes)











RADHA HOWARD OT Jul 10, 2017 11:25

## 2017-07-10 NOTE — PHYSICAL THERAPY DAILY NOTE
PT Daily Note-Current


Subjective


Pt. agrees to Rx but states she is so very tired after this morning.  Pt. was 

perplexed that this therapist invited her to manage her own pants up down and 

to clean herself after toileting.  This PTA explained goals for DC etc.





Pain





   Numeric Pain Scale:  4


   Location:  Right


   Location Body Site:  Hip


   Pain Description:  Ache





Appearance


some SOB noted after Rx as pt. TRFd sit to sup





Transfers


              Functional [place] Measure


0=Not Assessed/NA   4=Minimal Assistance


1=Total Assistance   5=Supervision or Setup


2=Maximal Assistance   6=Modified [place]


3=Moderate Assistance   7=Complete IndependenceIRFPAI Quality Coding Scale











6 Independent with activity with or without an assistive device


 


5  Patient requires set up or clean up by helper.  Patient completes activity  

by  themselves


 


4 Supervision or touching assist (CGA). [place] provide cues , steadying assist


 


3 The helper provides less than half the effort to complete the activity


 


2 The helper provides more than half the effort to complete the activity


 


1 Dependent.  The helper does all the effort to complete an activity 


 


7 Patient refused to complete or attempt activity


 


9 The patient did not perform the activity before the current illness or injury


 


88 Not attempted due to Medical conditions or safety concerns








Transfers (B, C, W/C) (FIM):  4


Scootin


Rollin


Supine to/from Sit:  4 (assist CGA for RLE into bed)


Sit to/from Stand:  5


Bed to/from Chair:  5





Weight Bearing


Weight Bearing Restriction:  Weight Bearing/Tolerated





Gait Training


Does the Patient Walk?:  Yes


Gait (FIM):  2


Distance (FIM):  9=380-90 ft (125x2)


Gait Level of Assist:  5


Gait Persons Needed:  1


Gait Assistive Device:  FWW


slow step to gait pattern





Exercises


Supine Ex:  Ankle pumps, Quad Set, Glut sets, Heel Slides, Short Arc Quads, 

Scooting, Hip abd/add


Supine Reps:  12





Assessment


Current Status:  Good Progress


pt. with apparenet anxiety at end of rx. c/o SOB, sats steady at 98% , , 

nurse informed of pts. c/o and assessed pt.





PT Short Term Goals


Short Term Goals


Time Frame:  2017


Transfers (B,C,W/C) (FIM):  4


Gait (FIM):  4


Gait Distance Comment:  150'


Gait Level of Assist:  4


Gait Assistive Device:  FWW


Wheelchair Distance:  50'





PT Long Term Goals


Long Term Goals


PT Long Term Goals Time Frame:  2017


Transfers (B,C,W/C) (FIM):  5


Sit to Lying (QC):  4


Lying-Sitting on Side/Bed(QC):  4


Sit to Stand (QC):  4


Rollin


Roll Left to Right (QC):  4


Chair/Bed-to-Chair Xfer(QC):  4


Gait (FIM):  5


Distance:  200'


Walk 10 feet (QC):  4


Walk 10ft-Uneven Surface(QC):  4


Walk 50ft with 2 Turns (QC):  4


Walk 150 ft (QC):  4


Gait Level of Assist:  5


Gait Assistive Device:  FWW


Stairs (FIM):  2


# of Steps:  4


1 Step (curb) (QC):  4


4 Steps (QC):  4


12 Steps (QC):  88


Stairs Level Of Assist:  4





PT Plan


Treatment/Plan


Treatment Plan:  Continue Plan of Care


Treatment Plan:  Bed Mobility, Education, Functional Activity Ashley, Functional 

Strength, Group Therapy, Gait, Safety, Therapeutic Exercise, Transfers


Treatment Duration:  2017


Frequency:  At least 5-7 days/Wk (IRF)


Estimated Hrs Per Day:  1.5 hours per day


Patient and/or Family Agrees t:  Yes





Safety Risks/Education


Patient Education:  Gait Training, Transfer Techniques, Correct Positioning, 

Disease Process, Safety Issues


Teaching Recipient:  Patient


Teaching Methods:  Demonstration, Discussion


Response to Teaching:  Verbalize Understanding, Return Demonstration, 

Reinforcement Needed





Time/GCodes


Time In:  1300


Time Out:  1335


Total Billed Treatment Time:  35


Total Billed Treatment


1,GT15m,FA20m


G Codes Necessary:  Yes











DANIELLA SHELTON PTA Jul 10, 2017 13:46

## 2017-07-10 NOTE — DIAGNOSTIC IMAGING REPORT
INDICATION: Diarrhea.



KUB 10:58 AM.



There is scoliosis of the lumbar spine convex to the left. There

are postop changes from right hip arthroplasty. Bowel gas pattern

is normal. There are no pathologic masses or calcifications.



IMPRESSION: Scoliosis with degenerative changes in the lumbar

spine. No acute abnormality seen in the abdomen.



Dictated by: 



  Dictated on workstation # [id number]

## 2017-07-10 NOTE — PM & R (SOAP) PROGRESS NOTE
Subjective


Time Seen by Provider:  11:40


Subjective/Events-last exam


Patient was seen in her room this AM C/O diarrhea States that she would like to 

see DR VUONG re this She declined a colonoscopy with DR Apple.Will f/u Patient 

min assist for transfers.


Review of Systems


Gastrointestinal:  Diarrhea





Objective


Exam


Last Set of Vital Signs





Vital Signs








  Date Time  Temp Pulse Resp B/P (MAP) Pulse Ox O2 Delivery O2 Flow Rate FiO2


 


7/10/17 09:00     96 Room Air  


 


7/10/17 04:10 99.1 88 18 115/62    





Capillary Refill : Less Than 3 SecondsNONE


I&O











Intake and Output 


 


 7/10/17





 00:00


 


Intake Total 1090 ml


 


Balance 1090 ml


 


 


 


Intake Oral 1090 ml


 


# Voids 7


 


# Bowel Movements 17








General:  Alert, Oriented X3, Cooperative, No Acute Distress


HEENT:  Atraumatic, PERRLA, EOMI, Mucous Memb Moist/Pink


Neck:  Supple, No JVD


Lungs:  Clear to Auscultation


Heart:  Regular Rate


Abdomen:  Normal Bowel Sounds, Soft, No Tenderness


Extremities:  Other (trace edema rt ankle)


Neuro:  Other (guarding rt hip has functional strength distal RT Leg)





Results


Lab





Microbiology


7/9/17 Stool Culture - Preliminary, Resulted


         No stool pathogens as yet isolated.  ...





Assessment/Plan


Assessment


Displaced rt femoral neck frx  s/p Bipolar Billy arthroplasty rt hip DR Goodrich 

7/4/17


Postop anemia


Chronic diarrhea


Postop DVT prophylaxis on Lovenox Sub CUT


Hypokalemia replaced





Plan


Continue PT/OT 


Check Labs F/u further eval od diarrhea


Team Conference 7/12/17











SHIN DEL VALLE MD Jul 10, 2017 15:22

## 2017-07-11 VITALS — SYSTOLIC BLOOD PRESSURE: 133 MMHG | DIASTOLIC BLOOD PRESSURE: 70 MMHG

## 2017-07-11 VITALS — DIASTOLIC BLOOD PRESSURE: 63 MMHG | SYSTOLIC BLOOD PRESSURE: 117 MMHG

## 2017-07-11 LAB
ALBUMIN SERPL-MCNC: 2.2 GM/DL (ref 3.2–4.5)
ALT SERPL-CCNC: 9 U/L (ref 0–55)
ANION GAP SERPL CALC-SCNC: 5 MMOL/L (ref 5–14)
AST SERPL-CCNC: 13 U/L (ref 5–34)
BASOPHILS # BLD AUTO: 0 10^3/UL (ref 0–0.1)
BASOPHILS NFR BLD AUTO: 1 % (ref 0–10)
BILIRUB SERPL-MCNC: 0.5 MG/DL (ref 0.1–1)
BUN SERPL-MCNC: 18 MG/DL (ref 7–18)
BUN/CREAT SERPL: 20
CALCIUM SERPL-MCNC: 8.3 MG/DL (ref 8.5–10.1)
CHLORIDE SERPL-SCNC: 111 MMOL/L (ref 98–107)
CO2 SERPL-SCNC: 18 MMOL/L (ref 21–32)
CREAT SERPL-MCNC: 0.91 MG/DL (ref 0.6–1.3)
EOSINOPHIL # BLD AUTO: 0.1 10^3/UL (ref 0–0.3)
EOSINOPHIL NFR BLD AUTO: 1 % (ref 0–10)
ERYTHROCYTE [DISTWIDTH] IN BLOOD BY AUTOMATED COUNT: 14.5 % (ref 10–14.5)
GFR SERPLBLD BASED ON 1.73 SQ M-ARVRAT: 59 ML/MIN
GLUCOSE SERPL-MCNC: 145 MG/DL (ref 70–105)
LYMPHOCYTES # BLD AUTO: 0.7 X 10^3 (ref 1–4)
LYMPHOCYTES NFR BLD AUTO: 9 % (ref 12–44)
MCH RBC QN AUTO: 30 PG (ref 25–34)
MCHC RBC AUTO-ENTMCNC: 33 G/DL (ref 32–36)
MCV RBC AUTO: 91 FL (ref 80–99)
MONOCYTES # BLD AUTO: 1.1 X 10^3 (ref 0–1)
MONOCYTES NFR BLD AUTO: 14 % (ref 0–12)
NEUTROPHILS # BLD AUTO: 5.6 X 10^3 (ref 1.8–7.8)
NEUTROPHILS NFR BLD AUTO: 75 % (ref 42–75)
PLATELET # BLD: 423 10^3/UL (ref 130–400)
PMV BLD AUTO: 8.4 FL (ref 7.4–10.4)
POTASSIUM SERPL-SCNC: 4.8 MMOL/L (ref 3.6–5)
PROT SERPL-MCNC: 6 GM/DL (ref 6.4–8.2)
RBC # BLD AUTO: 3.47 10^6/UL (ref 4.35–5.85)
SODIUM SERPL-SCNC: 134 MMOL/L (ref 135–145)
WBC # BLD AUTO: 7.4 10^3/UL (ref 4.3–11)

## 2017-07-11 RX ADMIN — ASPIRIN SCH MG: 325 TABLET, COATED ORAL at 09:28

## 2017-07-11 RX ADMIN — CHOLESTYRAMINE SCH GM: 4 POWDER, FOR SUSPENSION ORAL at 16:37

## 2017-07-11 RX ADMIN — LACTOBACILLUS TAB SCH TAB.CHEW: TAB at 06:12

## 2017-07-11 RX ADMIN — HYDROCODONE BITARTRATE AND ACETAMINOPHEN PRN TAB: 5; 325 TABLET ORAL at 05:47

## 2017-07-11 RX ADMIN — ENOXAPARIN SODIUM SCH MG: 100 INJECTION SUBCUTANEOUS at 09:28

## 2017-07-11 RX ADMIN — CHOLESTYRAMINE SCH GM: 4 POWDER, FOR SUSPENSION ORAL at 05:52

## 2017-07-11 RX ADMIN — DIPHENOXYLATE HYDROCHLORIDE AND ATROPINE SULFATE PRN EA: 2.5; .025 TABLET ORAL at 09:28

## 2017-07-11 RX ADMIN — CHOLESTYRAMINE SCH GM: 4 POWDER, FOR SUSPENSION ORAL at 12:19

## 2017-07-11 RX ADMIN — CHOLESTYRAMINE SCH GM: 4 POWDER, FOR SUSPENSION ORAL at 20:41

## 2017-07-11 RX ADMIN — DIPHENOXYLATE HYDROCHLORIDE AND ATROPINE SULFATE PRN EA: 2.5; .025 TABLET ORAL at 05:47

## 2017-07-11 RX ADMIN — LACTOBACILLUS TAB SCH TAB.CHEW: TAB at 12:18

## 2017-07-11 RX ADMIN — LORATADINE SCH MG: 10 TABLET ORAL at 09:28

## 2017-07-11 RX ADMIN — DIPHENOXYLATE HYDROCHLORIDE AND ATROPINE SULFATE PRN EA: 2.5; .025 TABLET ORAL at 20:44

## 2017-07-11 RX ADMIN — POTASSIUM CHLORIDE SCH MEQ: 1500 TABLET, EXTENDED RELEASE ORAL at 06:12

## 2017-07-11 RX ADMIN — CHOLESTYRAMINE SCH GM: 4 POWDER, FOR SUSPENSION ORAL at 09:27

## 2017-07-11 RX ADMIN — LACTOBACILLUS TAB SCH TAB.CHEW: TAB at 16:37

## 2017-07-11 NOTE — PHYSICAL THERAPY DAILY NOTE
PT Daily Note-Current


Subjective


Patient agrees to continue with therapy.





Pain





   Numeric Pain Scale:  5-Moderate Pain


   Location:  Right, Left


   Location Body Site:  Knee


   Pain Description:  Ache, Pressure





Mental Status


Patient Orientation:  Normal For Age





Transfers


              Functional [place] Measure


0=Not Assessed/NA   4=Minimal Assistance


1=Total Assistance   5=Supervision or Setup


2=Maximal Assistance   6=Modified [place]


3=Moderate Assistance   7=Complete IndependenceIRFPAI Quality Coding Scale











6 Independent with activity with or without an assistive device


 


5  Patient requires set up or clean up by helper.  Patient completes activity  

by  themselves


 


4 Supervision or touching assist (CGA). [place] provide cues , steadying assist


 


3 The helper provides less than half the effort to complete the activity


 


2 The helper provides more than half the effort to complete the activity


 


1 Dependent.  The helper does all the effort to complete an activity 


 


7 Patient refused to complete or attempt activity


 


9 The patient did not perform the activity before the current illness or injury


 


88 Not attempted due to Medical conditions or safety concerns








Transfers (B, C, W/C) (FIM):  5


Scootin


Rollin


Roll Left to Right (QC):  5


Supine to/from Sit:  5


Sit to/from Stand:  5


Sit to Lying (QC):  5


Sit to Stand (QC):  5


Chair/Bed-to-Chair Xfer(QC):  5


Bed to/from Chair:  5





Weight Bearing


Weight Bearing Restriction:  Weight Bearing/Tolerated


Location Restriction:  R LE





Gait Training


Does the Patient Walk?:  Yes


Gait (FIM):  5


Distance (FIM):  3=150 ft


Distance:  150'


Walk 10 feet (QC):  5


Walk 50 ft with 2 Turns(QC):  5


Walk 150 ft (QC):  5


Gait Level of Assist:  5


Gait Persons Needed:  1


Gait Assistive Device:  FWW


antalgic, step to pattern





Stair Training


 Stair Training: Handrails/:  1 handrail, uses walker


Stairs (FIM):  2


1 Step (curb) (QC):  4


4 Steps (QC):  4


12 Steps (QC):  9


Stairs:  Pattern:  Step to


Level of Assist:  4





Exercises


Supine Ex:  Ankle pumps, Quad Set, Heel Slides


Supine Reps:  15


Seated Therapy Exercises:  Long arc quads


Seated Reps:  15





Assessment


Patient able to perform steps with much encouragement due to apprehensive to 

increase activity.  Patient returned to room and OT in to continue with 

treatment plan.  Patient progressing with POC.





PT Short Term Goals


Short Term Goals


Time Frame:  2017


Transfers (B,C,W/C) (FIM):  4


Gait (FIM):  4


Gait Distance Comment:  150'


Gait Level of Assist:  4


Gait Assistive Device:  FWW


Wheelchair Distance:  50'





PT Long Term Goals


Long Term Goals


PT Long Term Goals Time Frame:  2017


Transfers (B,C,W/C) (FIM):  5 (met 17)


Sit to Lying (QC):  4 (met 17)


Lying-Sitting on Side/Bed(QC):  4 (met 17)


Sit to Stand (QC):  4 (met 17)


Rollin


Roll Left to Right (QC):  4 (met 17)


Chair/Bed-to-Chair Xfer(QC):  4


Gait (FIM):  5


Distance:  200'


Walk 10 feet (QC):  4


Walk 10ft-Uneven Surface(QC):  4


Walk 50ft with 2 Turns (QC):  4


Walk 150 ft (QC):  4


Gait Level of Assist:  5


Gait Assistive Device:  FWW


Stairs (FIM):  2 (met 17)


# of Steps:  4 (met 17)


1 Step (curb) (QC):  4 (met 17)


4 Steps (QC):  4 (met 17)


12 Steps (QC):  88


Stairs Level Of Assist:  4 (met 17)





PT Plan


Treatment/Plan


Treatment Plan:  Continue Plan of Care


Treatment Plan:  Bed Mobility, Education, Functional Activity [place], Functional 

Strength, Group Therapy, Gait, Safety, Therapeutic Exercise, Transfers


Treatment Duration:  2017


Frequency:  At least 5-7 days/Wk (IRF)


Estimated Hrs Per Day:  1.5 hours per day


Patient and/or Family Agrees t:  Yes





Safety Risks/Education


Patient Education:  Steps


Teaching Recipient:  Patient


Teaching Methods:  Demonstration, Discussion


Response to Teaching:  Verbalize Understanding, Return Demonstration





Time/GCodes


Time In:  1030


Time Out:  1100


Total Billed Treatment Time:  30


Total Billed Treatment


1 visit


EX 15 min


FA 15 min











TRAMAINE WILLIAM PT 2017 11:15

## 2017-07-11 NOTE — PHYSICAL THERAPY DAILY NOTE
PT Daily Note-Current


Subjective


Pt sitting in recliner upon arrival.  Pt appeared anxious at start of tx.  Pt 

reports pain in R hip with WB activity.  Pt agrees to PT.





Pain





   Numeric Pain Scale:  5-Moderate Pain


   Location:  Right


   Location Body Site:  Hip


   Pain Description:  Ache, Tightness


   Comment:  Pt has R hip as well as L knee pain from how she slept.





Mental Status


Patient Orientation:  Person, Place, Situation





Transfers


              Functional [place] Measure


0=Not Assessed/NA   4=Minimal Assistance


1=Total Assistance   5=Supervision or Setup


2=Maximal Assistance   6=Modified [place]


3=Moderate Assistance   7=Complete IndependenceIRFPAI Quality Coding Scale











6 Independent with activity with or without an assistive device


 


5  Patient requires set up or clean up by helper.  Patient completes activity  

by  themselves


 


4 Supervision or touching assist (CGA). [place] provide cues , steadying assist


 


3 The helper provides less than half the effort to complete the activity


 


2 The helper provides more than half the effort to complete the activity


 


1 Dependent.  The helper does all the effort to complete an activity 


 


7 Patient refused to complete or attempt activity


 


9 The patient did not perform the activity before the current illness or injury


 


88 Not attempted due to Medical conditions or safety concerns








Scootin


Sit to/from Stand:  4


Sit to Stand (QC):  4





Weight Bearing


Weight Bearing Restriction:  Weight Bearing/Tolerated


Location Restriction:  R LE





Gait Training


Does the Patient Walk?:  Yes


Distance (FIM):  3=150 ft


Distance:  150'


Walk 10 feet (QC):  4


Walk 50 ft with 2 Turns(QC):  4


Walk 150 ft (QC):  4


Gait Level of Assist:  4


Gait Persons Needed:  1


Gait Assistive Device:  FWW


Pt walks with antalgic gait pattern.  Pt salma is slow but steady, no LOB.





Wheelchair Training


Does the Pt Use a Wheelchair?:  No





Exercises


Seated Therapy Exercises:  Ankle pumps, Long arc quads, Hip flexion, Kicking 

activity, Hip abd/add


Seated Reps:  15





Treatments


Pt transferred from recliner to standing using FWW at CGA-Min A.  Pt ambulated 

in hallway using FWW at CGA.  Pt rested shortly before continuing back to 

recliner in room to rest.  Pt completed Seated Ex at recliner.  Pt transferred 

to [facility] to use at end of tx with all needs met.





Assessment


Current Status:  Fair Progress


Pt fatigues easy and needs rest break to recover although ambulated farther in 

one setting than this morning.  Pt reported increased pain with WB.





PT Short Term Goals


Short Term Goals


Time Frame:  2017


Transfers (B,C,W/C) (FIM):  4


Gait (FIM):  4


Gait Distance Comment:  150'


Gait Level of Assist:  4


Gait Assistive Device:  FWW


Wheelchair Distance:  50'





PT Long Term Goals


Long Term Goals


PT Long Term Goals Time Frame:  2017


Transfers (B,C,W/C) (FIM):  5 (met 17)


Sit to Lying (QC):  4 (met 17)


Lying-Sitting on Side/Bed(QC):  4 (met 17)


Sit to Stand (QC):  4 (met 17)


Rollin


Roll Left to Right (QC):  4 (met 17)


Chair/Bed-to-Chair Xfer(QC):  4


Gait (FIM):  5


Distance:  200'


Walk 10 feet (QC):  4


Walk 10ft-Uneven Surface(QC):  4


Walk 50ft with 2 Turns (QC):  4


Walk 150 ft (QC):  4


Gait Level of Assist:  5


Gait Assistive Device:  FWW


Stairs (FIM):  2 (met 17)


# of Steps:  4 (met 17)


1 Step (curb) (QC):  4 (met 17)


4 Steps (QC):  4 (met 17)


12 Steps (QC):  88


Stairs Level Of Assist:  4 (met 17)





PT Plan


Problem List


Problem List:  Activity Tolerance, Functional Strength, Safety, Balance, Gait, 

Transfer





Treatment/Plan


Treatment Plan:  Continue Plan of Care


Treatment Plan:  Bed Mobility, Education, Functional Activity [place], Functional 

Strength, Group Therapy, Gait, Safety, Therapeutic Exercise, Transfers


Treatment Duration:  2017


Frequency:  At least 5-7 days/Wk (IRF)


Estimated Hrs Per Day:  1.5 hours per day


Patient and/or Family Agrees t:  Yes





Safety Risks/Education


Patient Education:  Gait Training, Transfer Techniques, Correct Positioning, 

Safety Issues


Teaching Recipient:  Patient


Teaching Methods:  Discussion


Response to Teaching:  Verbalize Understanding





Time/GCodes


Time In:  1400


Time Out:  1430


Total Billed Treatment Time:  30


Total Billed Treatment


visit, EX (15m), GT (15m)











DENNIS ALAN PTA 2017 15:34

## 2017-07-11 NOTE — PM & R (SOAP) PROGRESS NOTE
Subjective


Time Seen by Provider:  07:45


Subjective/Events-last exam


Patient was seen in her room Discussed case with RN RN reportsd bloody stools 

Stool for C DIF Negative.Patient request DR Cui for eval See orders Patient 

Setup for upper body dressing Max assist for Lower body dressing Mod assist for 

toilet transfers





Objective


Exam


Last Set of Vital Signs





Vital Signs








  Date Time  Temp Pulse Resp B/P (MAP) Pulse Ox O2 Delivery O2 Flow Rate FiO2


 


7/11/17 03:26 99.5 92 18 117/63 94 Room Air  





Capillary Refill : Less Than 3 SecondsNONE


I&O











Intake and Output 


 


 7/11/17





 00:00


 


Intake Total 1250 ml


 


Balance 1250 ml


 


 


 


Intake Oral 1250 ml


 


# Voids 7


 


# Bowel Movements 10








General:  Alert, Oriented X3, Cooperative, No Acute Distress


HEENT:  Atraumatic, PERRLA, EOMI, Mucous Memb Moist/Pink


Neck:  Supple, No JVD


Lungs:  Clear to Auscultation


Heart:  Regular Rate


Abdomen:  Normal Bowel Sounds, Soft, No Tenderness


Extremities:  Other (trace edema rt ankle)


Neuro:  Other (guarding rt hip has functional strength distal RT Leg)





Results


Lab





Microbiology


7/9/17 Stool Culture - Preliminary, Resulted


         No stool pathogens as yet isolated.  ...





Assessment/Plan


Assessment


Displaced rt femoral neck frx  s/p Bipolar Billy arthroplasty rt hip DR Goodrich 

7/4/17


Postop anemia


Chronic diarrhea-associate with bloody stools as per RN report


Postop DVT prophylaxis on Lovenox Sub CUT


Hypokalemia replaced





Plan


Continue PT/OT 


Check Labs-See orders


Team Conference tomorrow 7/12/17


Consult DR Cui General Surgery











SHIN DEL VALLE MD Jul 11, 2017 08:08

## 2017-07-11 NOTE — OCCUPATIONAL THER DAILY NOTE
OT Current Status-Daily Note


Subjective


Pt talking with physician.  Nrsg and Lab in room for pt.  Pt agreed to therapy.

  No c/o pain at this time.





Mental Status/Objective


Patient Orientation:  Person, Place, Time, Situation


              Functional [place] Measure


0=Not Assessed/NA   4=Minimal Assistance


1=Total Assistance   5=Supervision or Setup


2=Maximal Assistance   6=Modified [place]


3=Moderate Assistance   7=Complete Rice





ADL-Treatment


Assist to go from sit to stand min A.  CGA to stand pivot transfer using FWW 

from recliner to [facility].  Pt was able to pull pants down over hips with CGA.  

After set up, pt was able to bathe upper body and upper legs. Pt required 

assistance to cleanse buttocks, able to cleanse destiny area by self.  Assist and 

verbal cues to use drsg stick to doff/don briefs.  Pt was able to hike over 

hips with CGA for balance.  Pt was very anxious about colonoscopy.  PT took 

over care of pt.  All needs met.


              Functional [place] Measure


0=Not Assessed/NA   4=Minimal Assistance


1=Total Assistance   5=Supervision or Setup


2=Maximal Assistance   6=Modified [place]


3=Moderate Assistance   7=Complete IndependenceIRFPAI Quality Coding Scale











6 Independent with activity with or without an assistive device


 


5  Patient requires set up or clean up by helper.  Patient completes activity  

by  themselves


 


4 Supervision or touching assist (CGA). [place] provide cues , steadying assist


 


3 The helper provides less than half the effort to complete the activity


 


2 The helper provides more than half the effort to complete the activity


 


1 Dependent.  The helper does all the effort to complete an activity 


 


7 Patient refused to complete or attempt activity


 


9 The patient did not perform the activity before the current illness or injury


 


88 Not attempted due to Medical conditions or safety concerns








Bathing (FIM):  3


Bathing Location:  L Arm, R Arm, L Upper Leg, R Upper Leg, Chest, Abdomen, 

Perineal Area


Lower Body Dressing (FIM):  3


Toileting (FIM):  3


Transfers (B, C, W/C) (FIM):  4


Toilet/Commode Transfer (FIM):  4





OT Short Term Goals


Short Term Goals


Time Frame:  2017


Bathing(FIM):  3


Lower Body Dressing(FIM):  3


Toileting(FIM):  3


Transfers (B,C,W/C) (FIM):  4


Shower Transfer(FIM):  4


Additional Short Term Goals:  1-Demonstrate ADL Tasks, 2-Verbalize Understanding

, 3-ImproveStrength/Ashley


1=Demonstrate adherence to instructed precautions during ADL tasks.


2=Patient will verbalize/demonstrate understanding of assistive devices/

modifications for ADL.


3=Patient will improve strength/tolerance for activity to enable patient to 

perform ADL's.





OT Long Term Goals


Long Term Goals


Time Frame:  2017


Eating (FIM):  6


Eating (QC):  6


Groomin


Oral Hygiene (QC):  6


Bathing(FIM):  5


Shower/Bathe Self (QC):  5


Upper Body Dressing(FIM):  5


Upper Body Dressing (QC):  5


Lower Body Dressing(FIM):  5


Lower Body Dressing (QC):  5


On/Off Footwear (QC):  5


Toileting(FIM):  6


Toileting Hygiene (QC):  6


Toilet/Commode Transfer(FIM):  6


Toilet/Commode Transfer (QC):  6


Shower Transfer(FIM):  5


Additional Goals:  1-Demonstrate ADL Tasks, 2-Verbalize Understanding, 3-

ImproveStrength/Ashley


1=Demonstrate adherence to instructed precautions during ADL tasks.


2=Patient will verbalize/demonstrate understanding of assistive devices/

modifications for ADL.


3=Patient will improve strength/tolerance for activity to enable patient to 

perform ADL's.





OT Education/Plan


Discharge Recommendations


Plan/Recommendations:  Continue POC





Treatment Plan/Plan of Care


Patient would benefit from OT for education, treatment and training to promote 

independence in ADL's, mobility, safety and/or upper extremity function for ADL'

s.


Plan of Care:  ADL Retraining, Functional Mobility, Group Exercise/Act as Ind, 

UE Funct Exercise/Act


Treatment Duration:  2017


Frequency:  Twice Daily


Estimated Hrs Per Day:  1.5 hours per day


Agreement:  Yes


Rehab Potential:  Fair





Time/GCodes


Start Time:  09:25


Stop Time:  10:00


Total Time Billed (hr/min):  35


Billed Treatment Time


1 visit-ADL 2 (35 min)











KHADAR GARCIA 2017 14:04

## 2017-07-11 NOTE — PROGRESS NOTE-HOSPITALIST
Subjective


HPI/CC On Admission


Date Seen by Provider:  Jul 11, 2017


Time Seen by Provider:  10:00


Subjective/Events-last exam


Mrs. Larry reports she's only had one bowel movement today and it was loose 

without blood.  She has little over a month's history of chronic diarrhea as 

many of tan stools today per day occasionally with bright red blood or pink 

discoloration not reportedly mixed in with the stool.  She has no previous 

history of chronic diarrhea and this predates any antibiotic use.  Stool 

cultures and C. difficile toxin studies have been negative from last week.  

Apparently there had been some stool present and concerns for overflow diarrhea 

although definitive hard stool or need for disimpaction is not reported.  Dr. Lipscomb had recommended colonoscopy but the patient stated that she did not know 

him and requested that if she had to have the procedure done that I perform it.

  I do have a relationship with several other family members and performs their 

colonoscopies.  She couldn't tell me if she lost any weight.  Her mother 

apparently had uterine cancer that metastasized to the bowel but she's not 

aware of any family history for colon cancer.  She does note abdominal 

sensitivity of if she leans against anything that places pressure on her 

abdomen going on for the past month.  She's been doing more belching and does 

report intermittent dysphagia to solids feeling that it hangs up in the 

epigastric area.  She has not had any regurgitation events coughing or choking 

with meals and has always been able to get food to go down.  She denies melena.

  She has not had previous colonoscopy or upper endoscopy.  She denies chills 

fever or night sweats.





Objective


Exam


Vital Signs





Vital Sign - Last 12Hours








 7/7/17





 10:30


 


Temp 97.3


 


Pulse 97


 


Resp 18


 


B/P (MAP) 107/52


 


Pulse Ox 100


 


O2 Delivery Room Air





Capillary Refill : NONENONE


General Appearance:  Anxious, Chronically ill


HEENT:  Pale Conjunctivae (L), Pale Conjunctivae (R)


Respiratory:  Chest Non Tender, Lungs Clear, Normal Breath Sounds, No Accessory 

Muscle Use, No Respiratory Distress


Cardiovascular:  Regular Rate, Rhythm, No Edema, No Gallop, No JVD, No Murmur, 

Normal Peripheral Pulses


Gastrointestinal:  No Organomegaly, No Pulsatile Mass, Soft, Distended, 

Tenderness (Mild generalized), Other (I'll tympany to percussion in the right 

upper quadrant)





Results/Procedures


Lab


Laboratory Tests


7/11/17 09:28














Assessment/Plan


Assessment and Plan


Assess & Plan/Chief Complaint


1.  Patient convalescing from right total hip replacement improving with 

physical therapy.


2.  Malnutrition improving with increasing albumin level and good appetite.


3.  Chronic diarrhea questionably overflow in etiology.  Patient is anxious and 

very concerned about whether or not she could complete a bowel prep.  Symptoms 

are concerning for partial small bowel obstruction and clearly malignancy needs 

to be ruled out.  As patient has a good appetite and did not have any evidence 

for impaction on yesterday's KUB with improving nutritional level we'll see 

about setting up colonoscopy for the following week as long as progress 

continues.











MANNY SALGADO MD Jul 11, 2017 10:46

## 2017-07-11 NOTE — OCCUPATIONAL THER DAILY NOTE
OT Current Status-Daily Note


Subjective


Pt finishing up with PT.  OT took over care of pt.  Pt agreed to therapy.





Pain





   Numeric Pain Scale:  0-No Pain


   Location:  Right, Left, Soft Tissue, Dorsal


   Location Body Site:  Foot


   Pain Description:  Burning


   Comment:  Itching





Mental Status/Objective


Patient Orientation:  Person, Place, Time, Situation


              Functional [place] Measure


0=Not Assessed/NA   4=Minimal Assistance


1=Total Assistance   5=Supervision or Setup


2=Maximal Assistance   6=Modified [place]


3=Moderate Assistance   7=Complete [place]





ADL-Treatment


Pt was able to doff socks with min A using dressing stick.  Then pt placed feet 

into foot bath.  Attempted to have pt cleanse feet with long handle sponge and 

pt stated that it made her feet itch worse.  BAXTER reported to nrsg the itching/

burning with bilateral dorsum of feet, B feet increased edema with R foot more 

than L.  Redness on dorsum of B feet.  Recommended to pt that she keep feet 

elevated as much as possible when not in therapy.  Pt then stated she needed to 

use the BSC.  Min A to transfer from recliner to BS.  Pt was able to hike 

pants down over hips.  Then pt unable to cleanse buttocks and requested for 

BAXTER to hike pants over hips.  Pt took increased time to complete theses tasks.

  Pt is very worried about all the different things that she has been asked to 

do while in the hospital (inspirometer, drinking water, and not having enough 

time to do either one when in therapy).  After therapy, pt sitting in recliner 

with feet elevated ordering lunch.  Call light/phone in reach.  All needs met 

in room.


              Functional [place] Measure


0=Not Assessed/NA   4=Minimal Assistance


1=Total Assistance   5=Supervision or Setup


2=Maximal Assistance   6=Modified [place]


3=Moderate Assistance   7=Complete IndependenceIRFPAI Quality Coding Scale











6 Independent with activity with or without an assistive device


 


5  Patient requires set up or clean up by helper.  Patient completes activity  

by  themselves


 


4 Supervision or touching assist (CGA). [place] provide cues , steadying assist


 


3 The helper provides less than half the effort to complete the activity


 


2 The helper provides more than half the effort to complete the activity


 


1 Dependent.  The helper does all the effort to complete an activity 


 


7 Patient refused to complete or attempt activity


 


9 The patient did not perform the activity before the current illness or injury


 


88 Not attempted due to Medical conditions or safety concerns








Lower Body Dressing (QC):  3


Toileting (FIM):  3


Transfers (B, C, W/C) (FIM):  4


Toilet/Commode Transfer (FIM):  4





OT Short Term Goals


Short Term Goals


Time Frame:  2017


Bathing(FIM):  3


Lower Body Dressing(FIM):  3


Toileting(FIM):  3


Transfers (B,C,W/C) (FIM):  4


Shower Transfer(FIM):  4


Additional Short Term Goals:  1-Demonstrate ADL Tasks, 2-Verbalize Understanding

, 3-ImproveStrength/Ashley


1=Demonstrate adherence to instructed precautions during ADL tasks.


2=Patient will verbalize/demonstrate understanding of assistive devices/

modifications for ADL.


3=Patient will improve strength/tolerance for activity to enable patient to 

perform ADL's.





OT Long Term Goals


Long Term Goals


Time Frame:  2017


Eating (FIM):  6


Eating (QC):  6


Groomin


Oral Hygiene (QC):  6


Bathing(FIM):  5


Shower/Bathe Self (QC):  5


Upper Body Dressing(FIM):  5


Upper Body Dressing (QC):  5


Lower Body Dressing(FIM):  5


Lower Body Dressing (QC):  5


On/Off Footwear (QC):  5


Toileting(FIM):  6


Toileting Hygiene (QC):  6


Toilet/Commode Transfer(FIM):  6


Toilet/Commode Transfer (QC):  6


Shower Transfer(FIM):  5


Additional Goals:  1-Demonstrate ADL Tasks, 2-Verbalize Understanding, 3-

ImproveStrength/Ashley


1=Demonstrate adherence to instructed precautions during ADL tasks.


2=Patient will verbalize/demonstrate understanding of assistive devices/

modifications for ADL.


3=Patient will improve strength/tolerance for activity to enable patient to 

perform ADL's.





OT Education/Plan


Discharge Recommendations


Plan/Recommendations:  Continue POC





Treatment Plan/Plan of Care


Patient would benefit from OT for education, treatment and training to promote 

independence in ADL's, mobility, safety and/or upper extremity function for ADL'

s.


Plan of Care:  ADL Retraining, Functional Mobility, Group Exercise/Act as Ind, 

UE Funct Exercise/Act


Treatment Duration:  2017


Frequency:  Twice Daily


Estimated Hrs Per Day:  1.5 hours per day


Agreement:  Yes


Rehab Potential:  Fair





Time/GCodes


Start Time:  11:00


Stop Time:  12:00


Total Time Billed (hr/min):  60


Billed Treatment Time


1 visit-FA 4 (60 min)











KHADAR GARCIA 2017 12:02

## 2017-07-12 VITALS — DIASTOLIC BLOOD PRESSURE: 56 MMHG | SYSTOLIC BLOOD PRESSURE: 114 MMHG

## 2017-07-12 VITALS — SYSTOLIC BLOOD PRESSURE: 109 MMHG | DIASTOLIC BLOOD PRESSURE: 69 MMHG

## 2017-07-12 RX ADMIN — POTASSIUM CHLORIDE SCH MEQ: 1500 TABLET, EXTENDED RELEASE ORAL at 06:27

## 2017-07-12 RX ADMIN — DIPHENOXYLATE HYDROCHLORIDE AND ATROPINE SULFATE PRN EA: 2.5; .025 TABLET ORAL at 18:14

## 2017-07-12 RX ADMIN — LORATADINE SCH MG: 10 TABLET ORAL at 08:12

## 2017-07-12 RX ADMIN — ASPIRIN SCH MG: 325 TABLET, COATED ORAL at 08:12

## 2017-07-12 RX ADMIN — ENOXAPARIN SODIUM SCH MG: 100 INJECTION SUBCUTANEOUS at 08:14

## 2017-07-12 RX ADMIN — LACTOBACILLUS TAB SCH TAB.CHEW: TAB at 13:47

## 2017-07-12 RX ADMIN — LACTOBACILLUS TAB SCH TAB.CHEW: TAB at 06:26

## 2017-07-12 RX ADMIN — DIPHENOXYLATE HYDROCHLORIDE AND ATROPINE SULFATE PRN EA: 2.5; .025 TABLET ORAL at 06:26

## 2017-07-12 RX ADMIN — CHOLESTYRAMINE SCH GM: 4 POWDER, FOR SUSPENSION ORAL at 06:27

## 2017-07-12 RX ADMIN — LACTOBACILLUS TAB SCH TAB.CHEW: TAB at 09:38

## 2017-07-12 NOTE — PM & R (SOAP) PROGRESS NOTE
Subjective


Time Seen by Provider:  07:40


Subjective/Events-last exam


Patient was seen in her room this AM Patient CGA for tranfers and gait Patient 

prfers DR Jo to manage her loose stools Patient doesnt care for cuurent 

med for Control of diarrhea(Questran) Discussed with RN Immodium substitued HGB 

stable Stool for OB+


Review of Systems


Gastrointestinal:  Diarrhea





Objective


Exam


Last Set of Vital Signs





Vital Signs








  Date Time  Temp Pulse Resp B/P (MAP) Pulse Ox O2 Delivery O2 Flow Rate FiO2


 


7/12/17 08:41      Room Air  


 


7/12/17 06:25 98.1 85 20 114/56 96   





Capillary Refill : NONENONE


I&O











Intake and Output 


 


 7/11/17





 23:59


 


Intake Total 1200 ml


 


Balance 1200 ml


 


 


 


Intake Oral 1200 ml


 


# Voids 9


 


# Bowel Movements 8








General:  Alert, Oriented X3, Cooperative, No Acute Distress


HEENT:  Atraumatic, PERRLA, EOMI, Mucous Memb Moist/Pink


Neck:  Supple, No JVD


Lungs:  Clear to Auscultation


Heart:  Regular Rate


Abdomen:  Normal Bowel Sounds, Soft, No Tenderness


Extremities:  Other (trace edema rt ankle)


Neuro:  Other (guarding rt hip has functional strength distal RT Leg)





Results


Lab


Laboratory Tests


7/11/17 09:28: 


White Blood Count 7.4, Red Blood Count 3.47L, Hemoglobin 10.4L, Hematocrit 32L, 

Mean Corpuscular Volume 91, Mean Corpuscular Hemoglobin 30, Mean Corpuscular 

Hemoglobin Concent 33, Red Cell Distribution Width 14.5, Platelet Count 423H, 

Mean Platelet Volume 8.4, Neutrophils (%) (Auto) 75, Lymphocytes (%) (Auto) 9L, 

Monocytes (%) (Auto) 14H, Eosinophils (%) (Auto) 1, Basophils (%) (Auto) 1, 

Neutrophils # (Auto) 5.6, Lymphocytes # (Auto) 0.7L, Monocytes # (Auto) 1.1H, 

Eosinophils # (Auto) 0.1, Basophils # (Auto) 0.0, Sodium Level 134L, Potassium 

Level 4.8, Chloride Level 111H, Carbon Dioxide Level 18L, Anion Gap 5, Blood 

Urea Nitrogen 18, Creatinine 0.91, Estimat Glomerular Filtration Rate 59, BUN/

Creatinine Ratio 20, Glucose Level 145H, Calcium Level 8.3L, Total Bilirubin 0.5

, Aspartate Amino Transf (AST/SGOT) 13, Alanine Aminotransferase (ALT/SGPT) 9, 

Alkaline Phosphatase 82, Total Protein 6.0L, Albumin 2.2L


7/12/17 03:39: Stool Occult Blood Immunoassay POSITIVEH





Microbiology


7/9/17 Stool Culture - Preliminary, Resulted


         No stool pathogens as yet isolated.  ...





Assessment/Plan


Assessment


Displaced rt femoral neck frx  s/p Bipolar Billy arthroplasty rt hip DR Goodrich 

7/4/17


Postop anemia


Chronic diarrhea-associate with bloody stools as per RN report


Postop DVT prophylaxis on Lovenox Sub CUT


Hypokalemia replaced





Plan


Continue PT/OT 


Checked Labs-


Team Conference held earlier today -See report for full functional; update and 

POC and ELOS


DR Jo Manageing Diarrhea-He will consider colonoscopy at a later time 

perhaps as an outpatient


D/C Questran as patients request


-Immodium ordered


Probiotic d/cd as patients request=She feels that it may be contributing to her 

loose stools


See orders.











SHIN DEL VALLE MD Jul 12, 2017 15:14

## 2017-07-12 NOTE — PROGRESS NOTE-HOSPITALIST
Progress Note


Progress Notes/Assess & Plan


Date Seen


7/12/17


Time Seen by Provider:  10:00


Diagonsis/Assessment & Plan


Chart Review:


Vitals stable





RN Review:


Positive occult stool but pt has bad hemorrhoids


Diarrhea is still present


Will refuse Lactinex. Pt has been given Lomotil, pt is not yet doing well on 

it. Pt refuses Questran


Pt refuses therapy because of her diarrhea


Pt would prefer Dr. Jo for colonoscopy and wants to have the colonoscopy 

here





Patient Interview:


Pt was in the bathroom upon interview


Pt confirms still having diarrhea


Pt asked if she is still able to eat anything. I confirmed this.


Pt was informed that we will have to do the colonoscopy as out-pt


Physical exam stable





AFVSS, Ple[place], O x 3


RRR, CTAB


No edema





Plan:


Continue Lomotil


Colonoscopy when Dr Jo arranges


Continue eating a well rounded diet





Scribed by Roxana Estevez under the direct supervision of Dr. Vogt.











RUTH ANN VOGT DO Jul 12, 2017 10:44

## 2017-07-12 NOTE — THERAPY GROUP DAILY NOTE
Therapy Daily Group Note


Patient Education Topic


Energy Cons, Exercises





Exercises


LE Seated Exercise, UE Exercise





Other/Notes


Pt walked to OT/PT group using FWW. Group consisted of introductions (name, 

place living, what inspires you), socialization, energy conservation education 

and upper/lower seated exercises that incorporated cardio. and energy 

conservation.  Pt contributed to discussions effectively.  Pt completed UE/LE 

exercises but reported that she couldn't complete EX although she could but was 

really motivated.  Pt was able to verbalize understanding of education topic 

and elaborate on discussions.  After group, pt walked back to room using FWW.  

Call light/phone in reach.  All needs met in room.


Start Time:  13:00


Stop Time:  14:15


Total Billed Treatment Time:  75


Total Billed Treatment


1, GRP











DENNIS ALAN PTA Jul 12, 2017 15:04

## 2017-07-12 NOTE — PHYSICAL THERAPY DAILY NOTE
PT Daily Note-Current


Subjective


Reports she has spent alot of time on the toilet today and it has impeded with 

her ability to participate with therapy. 


Pt currently on the toilet.





Mental Status


Patient Orientation:  Person, Place, Time, Situation





Transfers


              Functional [place] Measure


0=Not Assessed/NA   4=Minimal Assistance


1=Total Assistance   5=Supervision or Setup


2=Maximal Assistance   6=Modified [place]


3=Moderate Assistance   7=Complete IndependenceIRFPAI Quality Coding Scale











6 Independent with activity with or without an assistive device


 


5  Patient requires set up or clean up by helper.  Patient completes activity  

by  themselves


 


4 Supervision or touching assist (CGA). [place] provide cues , steadying assist


 


3 The helper provides less than half the effort to complete the activity


 


2 The helper provides more than half the effort to complete the activity


 


1 Dependent.  The helper does all the effort to complete an activity 


 


7 Patient refused to complete or attempt activity


 


9 The patient did not perform the activity before the current illness or injury


 


88 Not attempted due to Medical conditions or safety concerns











Treatments


Worked on functional seated and standing dynamic balance with pt performing 

destiny care while seated and then standing; pt able to pull pants up as well.  Pt 

required CGA throughout this.  pt stood at the sink to wash her hands with SBA.

  Pt then ambulated x 125 ft with FWW with SB-CGA.  Pt up in chair post 

treatment with needs met.





Assessment


Pt slow with pericare and toileting as well as arranging her clothing but able 

to do so.  Pt ambulates with a step to gait pattern but her speed is fairly 

normal.  She is progressing in her ability to transfer and ambulate.





PT Short Term Goals


Short Term Goals


Time Frame:  2017


Transfers (B,C,W/C) (FIM):  4


Gait (FIM):  4


Gait Distance Comment:  150'


Gait Level of Assist:  4


Gait Assistive Device:  FWW


Wheelchair Distance:  50'





PT Long Term Goals


Long Term Goals


PT Long Term Goals Time Frame:  2017


Transfers (B,C,W/C) (FIM):  5 (met 17)


Sit to Lying (QC):  4 (met 17)


Lying-Sitting on Side/Bed(QC):  4 (met 17)


Sit to Stand (QC):  4 (met 17)


Rollin


Roll Left to Right (QC):  4 (met 17)


Chair/Bed-to-Chair Xfer(QC):  4


Gait (FIM):  5


Distance:  200'


Walk 10 feet (QC):  4


Walk 10ft-Uneven Surface(QC):  4


Walk 50ft with 2 Turns (QC):  4


Walk 150 ft (QC):  4


Gait Level of Assist:  5


Gait Assistive Device:  FWW


Stairs (FIM):  2 (met 17)


# of Steps:  4 (met 17)


1 Step (curb) (QC):  4 (met 17)


4 Steps (QC):  4 (met 17)


12 Steps (QC):  88


Stairs Level Of Assist:  4 (met 17)





PT Plan


Problem List


Problem List:  Activity Tolerance, Functional Strength, Safety





Treatment/Plan


Treatment Plan:  Continue Plan of Care


Treatment Plan:  Bed Mobility, Education, Functional Activity Ashley, Functional 

Strength, Group Therapy, Gait, Safety, Therapeutic Exercise, Transfers


Treatment Duration:  2017


Frequency:  At least 5-7 days/Wk (IRF)


Estimated Hrs Per Day:  1.5 hours per day


Patient and/or Family Agrees t:  Yes





Safety Risks/Education


Patient Education:  Transfer Techniques, Safety Issues


Teaching Recipient:  Patient


Teaching Methods:  Demonstration, Discussion


Response to Teaching:  Reinforcement Needed





Discharge Recommendations


Therapy D/C Recommendations:  Physical Therapy Home Care





Time/GCodes


Time In:  1035


Time Out:  1105


Total Billed Treatment Time:  30


Total Billed Treatment


visit


Neuro 20


GT 10











KHADAR MALIN PT 2017 11:37

## 2017-07-12 NOTE — OCCUPATIONAL THER DAILY NOTE
OT Current Status-Daily Note


Subjective


Pt sitting in chair, agrees to treatment.





Mental Status/Objective


              Functional [place] Measure


0=Not Assessed/NA   4=Minimal Assistance


1=Total Assistance   5=Supervision or Setup


2=Maximal Assistance   6=Modified [place]


3=Moderate Assistance   7=Complete [place]





ADL-Treatment


Pt states she would like to shower and wash her hair today. Sit to stand from 

chair with supervision and cues for hand placement. Gait to restroom with FWW, 

slow pace. Pt requests to use toilet. Transfer to [facility] over toilet with CGA and 

cues for safety. Pt states she is anxious about whether or not she will have 

diarrhea and she completed toileting x3 during session with increased time. Pt 

able to manage pants up/down, but requires assist for thorough hygiene. 

Transfer to walk in shower with minimal assistance using grab bars for balance 

and safety. Upper body bathing completed with set up. Pt requires assist to wash

/dry hair. Pt able to wash bilateral upper legs and used long handled sponge to 

wash lower legs and feet. Stood with minimal assistance to wash buttocks. Pt 

able to dry upper body, but after shower pt reports urgent need to use toilet, 

so therapist assisted with drying lower body. Pt donned pullover gown with set 

up. Pt requires assist to start [place] over feet. Stood with CGA to pull over 

hips. Pt requires occasional cues for sequencing during ADL tasks and increased 

time for ADL completion. Pt transferred to chair with CGA, but then states she 

needs to use toilet again. Pt transferred to BS over toilet with CGA. Pt 

states she needs to sit for awhile, instructed to pull cord when she is 

finished. Nursing was notified.


              Functional [place] Measure


0=Not Assessed/NA   4=Minimal Assistance


1=Total Assistance   5=Supervision or Setup


2=Maximal Assistance   6=Modified [place]


3=Moderate Assistance   7=Complete IndependenceIRFPAI Quality Coding Scale











6 Independent with activity with or without an assistive device


 


5  Patient requires set up or clean up by helper.  Patient completes activity  

by  themselves


 


4 Supervision or touching assist (CGA). [place] provide cues , steadying assist


 


3 The helper provides less than half the effort to complete the activity


 


2 The helper provides more than half the effort to complete the activity


 


1 Dependent.  The helper does all the effort to complete an activity 


 


7 Patient refused to complete or attempt activity


 


9 The patient did not perform the activity before the current illness or injury


 


88 Not attempted due to Medical conditions or safety concerns








Upper Body (FIM):  5


Upper Body Dressing (QC):  5


Lower Body Dressing (FIM):  3


Lower Body Dressing (QC):  3


Toileting (FIM):  3


Toileting Hygiene (QC):  3


Toilet/Commode Transfer (FIM):  4


Toilet Transfer (QC):  4


Shower Transfer(FIM):  4





OT Short Term Goals


Short Term Goals


Time Frame:  2017


Bathing(FIM):  3


Lower Body Dressing(FIM):  3


Toileting(FIM):  3


Transfers (B,C,W/C) (FIM):  4


Shower Transfer(FIM):  4


Additional Short Term Goals:  1-Demonstrate ADL Tasks, 2-Verbalize Understanding

, 3-ImproveStrength/Ashley


1=Demonstrate adherence to instructed precautions during ADL tasks.


2=Patient will verbalize/demonstrate understanding of assistive devices/

modifications for ADL.


3=Patient will improve strength/tolerance for activity to enable patient to 

perform ADL's.





OT Long Term Goals


Long Term Goals


Time Frame:  2017


Eating (FIM):  6


Eating (QC):  6


Groomin


Oral Hygiene (QC):  6


Bathing(FIM):  5


Shower/Bathe Self (QC):  5


Upper Body Dressing(FIM):  5


Upper Body Dressing (QC):  5


Lower Body Dressing(FIM):  5


Lower Body Dressing (QC):  5


On/Off Footwear (QC):  5


Toileting(FIM):  6


Toileting Hygiene (QC):  6


Toilet/Commode Transfer(FIM):  6


Toilet/Commode Transfer (QC):  6


Shower Transfer(FIM):  5


Additional Goals:  1-Demonstrate ADL Tasks, 2-Verbalize Understanding, 3-

ImproveStrength/Ashley


1=Demonstrate adherence to instructed precautions during ADL tasks.


2=Patient will verbalize/demonstrate understanding of assistive devices/

modifications for ADL.


3=Patient will improve strength/tolerance for activity to enable patient to 

perform ADL's.





OT Education/Plan


Discharge Recommendations


Plan/Recommendations:  Continue POC





Treatment Plan/Plan of Care


Patient would benefit from OT for education, treatment and training to promote 

independence in ADL's, mobility, safety and/or upper extremity function for ADL'

s.


Plan of Care:  ADL Retraining, Functional Mobility, Group Exercise/Act as Ind, 

UE Funct Exercise/Act


Treatment Duration:  2017


Frequency:  Twice Daily


Estimated Hrs Per Day:  1.5 hours per day


Agreement:  Yes


Rehab Potential:  Fair





Time/GCodes


Start Time:  09:00


Stop Time:  10:15


Total Time Billed (hr/min):  75


Billed Treatment Time


1 visit, ADLx5(75minutes)











RADHA HOWARD OT 2017 10:46

## 2017-07-12 NOTE — PHYSICAL THERAPY DAILY NOTE
PT Daily Note-Current


Subjective


Pt. in bathroom and states she is still troubled by diarrhea and never really 

knows if she needs to go or not. Pt. expresses that she is nervous and anxious 

about this ordeal.





Pain





   Numeric Pain Scale:  0-No Pain





Appearance


bilateral LE edema





Mental Status


Patient Orientation:  Normal For Age


pt. is anxious and a bit unreasonable . Easily forgets a plan that has just 

been discussed for what to do the next few minutes and becomes frustrated etc.





Transfers


              Functional [place] Measure


0=Not Assessed/NA   4=Minimal Assistance


1=Total Assistance   5=Supervision or Setup


2=Maximal Assistance   6=Modified [place]


3=Moderate Assistance   7=Complete IndependenceIRFPAI Quality Coding Scale











6 Independent with activity with or without an assistive device


 


5  Patient requires set up or clean up by helper.  Patient completes activity  

by  themselves


 


4 Supervision or touching assist (CGA). [place] provide cues , steadying assist


 


3 The helper provides less than half the effort to complete the activity


 


2 The helper provides more than half the effort to complete the activity


 


1 Dependent.  The helper does all the effort to complete an activity 


 


7 Patient refused to complete or attempt activity


 


9 The patient did not perform the activity before the current illness or injury


 


88 Not attempted due to Medical conditions or safety concerns








Transfers (B, C, W/C) (FIM):  5


Scootin


Rollin


Supine to/from Sit:  5 (from flat surface no rails with instruction)





Gait Training


Does the Patient Walk?:  Yes


Gait (FIM):  4


Distance (FIM):  3=150 ft (x2)


Gait Level of Assist:  4


Gait Persons Needed:  1


Gait Assistive Device:  FWW


improving gait pattern, still slow, looks to floor while walking





Exercises


Seated Therapy Exercises:  Ankle pumps, Sit to stand, Long arc quads, Hip abd/

add


Seated Reps:  12





Treatments


pt. toileted on BSC and needs full instruction as to how to clean front and or 

back , states she cant remember how she did this at home.  Pt. wears brief and 

this PTA encourages walking to from toilet. Pt. resists and wants BSC next to 

bed secondary to loose stools.





Assessment


Current Status:  Good Progress


pts. anxiety limits her participation at times





PT Short Term Goals


Short Term Goals


Time Frame:  2017


Transfers (B,C,W/C) (FIM):  4


Gait (FIM):  4


Gait Distance Comment:  150'


Gait Level of Assist:  4


Gait Assistive Device:  FWW


Wheelchair Distance:  50'





PT Long Term Goals


Long Term Goals


PT Long Term Goals Time Frame:  2017


Transfers (B,C,W/C) (FIM):  5 (met 17)


Sit to Lying (QC):  4 (met 17)


Lying-Sitting on Side/Bed(QC):  4 (met 17)


Sit to Stand (QC):  4 (met 17)


Rollin


Roll Left to Right (QC):  4 (met 17)


Chair/Bed-to-Chair Xfer(QC):  4


Gait (FIM):  5


Distance:  200'


Walk 10 feet (QC):  4


Walk 10ft-Uneven Surface(QC):  4


Walk 50ft with 2 Turns (QC):  4


Walk 150 ft (QC):  4


Gait Level of Assist:  5


Gait Assistive Device:  FWW


Stairs (FIM):  2 (met 17)


# of Steps:  4 (met 17)


1 Step (curb) (QC):  4 (met 17)


4 Steps (QC):  4 (met 17)


12 Steps (QC):  88


Stairs Level Of Assist:  4 (met 17)





PT Plan


Treatment/Plan


Treatment Plan:  Continue Plan of Care


Treatment Plan:  Bed Mobility, Education, Functional Activity Ashley, Functional 

Strength, Group Therapy, Gait, Safety, Therapeutic Exercise, Transfers


Treatment Duration:  2017


Frequency:  At least 5-7 days/Wk (IRF)


Estimated Hrs Per Day:  1.5 hours per day


Patient and/or Family Agrees t:  Yes





Safety Risks/Education


Patient Education:  Gait Training, Transfer Techniques, Correct Positioning, 

Disease Process, Safety Issues


Teaching Recipient:  Patient


Teaching Methods:  Demonstration, Discussion


Response to Teaching:  Verbalize Understanding, Return Demonstration, 

Reinforcement Needed





Time/GCodes


Time In:  730


Time Out:  815


Total Billed Treatment Time:  45


Total Billed Treatment


1,EX15m,FA15m,GT15m


G Codes Necessary:  DANIELLA Torres PTA 2017 08:15

## 2017-07-13 VITALS — SYSTOLIC BLOOD PRESSURE: 137 MMHG | DIASTOLIC BLOOD PRESSURE: 66 MMHG

## 2017-07-13 VITALS — SYSTOLIC BLOOD PRESSURE: 99 MMHG | DIASTOLIC BLOOD PRESSURE: 57 MMHG

## 2017-07-13 RX ADMIN — HYDROCORTISONE SCH GM: 25 CREAM TOPICAL at 22:39

## 2017-07-13 RX ADMIN — ENOXAPARIN SODIUM SCH MG: 100 INJECTION SUBCUTANEOUS at 09:03

## 2017-07-13 RX ADMIN — ANORECTAL OINTMENT SCH GM: 15.7; .44; 24; 20.6 OINTMENT TOPICAL at 22:39

## 2017-07-13 RX ADMIN — LORATADINE SCH MG: 10 TABLET ORAL at 09:02

## 2017-07-13 RX ADMIN — LACTOBACILLUS TAB SCH TAB.CHEW: TAB at 12:16

## 2017-07-13 RX ADMIN — LACTOBACILLUS TAB SCH TAB.CHEW: TAB at 16:14

## 2017-07-13 RX ADMIN — LACTOBACILLUS TAB SCH TAB.CHEW: TAB at 05:30

## 2017-07-13 RX ADMIN — POTASSIUM CHLORIDE SCH MEQ: 1500 TABLET, EXTENDED RELEASE ORAL at 05:56

## 2017-07-13 RX ADMIN — DIPHENOXYLATE HYDROCHLORIDE AND ATROPINE SULFATE PRN EA: 2.5; .025 TABLET ORAL at 05:30

## 2017-07-13 RX ADMIN — ASPIRIN SCH MG: 325 TABLET, COATED ORAL at 09:02

## 2017-07-13 RX ADMIN — DIPHENOXYLATE HYDROCHLORIDE AND ATROPINE SULFATE PRN EA: 2.5; .025 TABLET ORAL at 22:39

## 2017-07-13 NOTE — PHYSICAL THERAPY DAILY NOTE
PT Daily Note-Current


Subjective


Pt. is very side tracked and worried about her diarrhea and wants to make sure 

she can get diagnosis and Rx if needed as she "cannot return home with diarrhea 

and swollen legs which caused all this to begin with"





Pain





   Numeric Pain Scale:  0-No Pain





Mental Status


anxiety and worry





Transfers


              Functional [place] Measure


0=Not Assessed/NA   4=Minimal Assistance


1=Total Assistance   5=Supervision or Setup


2=Maximal Assistance   6=Modified [place]


3=Moderate Assistance   7=Complete IndependenceIRFPAI Quality Coding Scale











6 Independent with activity with or without an assistive device


 


5  Patient requires set up or clean up by helper.  Patient completes activity  

by  themselves


 


4 Supervision or touching assist (CGA). [place] provide cues , steadying assist


 


3 The helper provides less than half the effort to complete the activity


 


2 The helper provides more than half the effort to complete the activity


 


1 Dependent.  The helper does all the effort to complete an activity 


 


7 Patient refused to complete or attempt activity


 


9 The patient did not perform the activity before the current illness or injury


 


88 Not attempted due to Medical conditions or safety concerns








Transfers (B, C, W/C) (FIM):  6


all TRFs toilet and chair SBA to Mod I





Gait Training


Gait Assistive Device:  FWW


150x2 SBA and better step length





Exercises


NuStep Minutes:  10


 NuStep Workload:  2





Assessment


Current Status:  Good Progress


conts limited secondary to anxiety. Nursing spoke with pt. a long time 

explaining her situation and what likely will ensue





PT Short Term Goals


Short Term Goals


Time Frame:  2017


Transfers (B,C,W/C) (FIM):  4


Gait (FIM):  4


Gait Distance Comment:  150'


Gait Level of Assist:  4


Gait Assistive Device:  FWW


Wheelchair Distance:  50'





PT Long Term Goals


Long Term Goals


PT Long Term Goals Time Frame:  2017


Transfers (B,C,W/C) (FIM):  5 (met 17)


Sit to Lying (QC):  4 (met 17)


Lying-Sitting on Side/Bed(QC):  4 (met 17)


Sit to Stand (QC):  4 (met 17)


Rollin


Roll Left to Right (QC):  4 (met 17)


Chair/Bed-to-Chair Xfer(QC):  4


Gait (FIM):  5


Distance:  200'


Walk 10 feet (QC):  4


Walk 10ft-Uneven Surface(QC):  4


Walk 50ft with 2 Turns (QC):  4


Walk 150 ft (QC):  4


Gait Level of Assist:  5


Gait Assistive Device:  FWW


Stairs (FIM):  2 (met 17)


# of Steps:  4 (met 17)


1 Step (curb) (QC):  4 (met 17)


4 Steps (QC):  4 (met 17)


12 Steps (QC):  88


Stairs Level Of Assist:  4 (met 17)





PT Plan


Treatment/Plan


Treatment Plan:  Continue Plan of Care


Treatment Plan:  Bed Mobility, Education, Functional Activity Ashley, Functional 

Strength, Group Therapy, Gait, Safety, Therapeutic Exercise, Transfers


Treatment Duration:  2017


Frequency:  At least 5-7 days/Wk (IRF)


Estimated Hrs Per Day:  1.5 hours per day


Patient and/or Family Agrees t:  Yes





Safety Risks/Education


Patient Education:  Gait Training, Transfer Techniques


Teaching Recipient:  Patient


Teaching Methods:  Demonstration, Discussion


Response to Teaching:  Verbalize Understanding, Return Demonstration, 

Reinforcement Needed





Time/GCodes


Time In:  1300


Time Out:  1330


Total Billed Treatment Time:  30


Total Billed Treatment


1,FA20m,EX10m


G Codes Necessary:  DANIELLA Torres PTA 2017 14:14

## 2017-07-13 NOTE — PHYSICAL THERAPY DAILY NOTE
PT Daily Note-Current


Subjective


Pt. agrees to Rx. Has already had breakfast and toileted.  States she is 

worried about rearranging her home ie, 7 steps in to house, garage detached and 

washer dryer are in basement. States she has realized a long time that this is 

not the safest arrangement for her. Doesnt want to move. This PTA suggested 

home modification and or hired assist a few hours a day.





Pain





   Numeric Pain Scale:  4


   Location:  Right


   Location Body Site:  Hip


   Pain Description:  Ache


   Comment:  with activity only, no pain at rest





Appearance


continues with pitted edema bilat LEs and up in to trunk





Mental Status


Patient Orientation:  Person, Place, Time, Situation


very anxious, worrier





Transfers


              Functional [place] Measure


0=Not Assessed/NA   4=Minimal Assistance


1=Total Assistance   5=Supervision or Setup


2=Maximal Assistance   6=Modified [place]


3=Moderate Assistance   7=Complete IndependenceIRFPAI Quality Coding Scale











6 Independent with activity with or without an assistive device


 


5  Patient requires set up or clean up by helper.  Patient completes activity  

by  themselves


 


4 Supervision or touching assist (CGA). [place] provide cues , steadying assist


 


3 The helper provides less than half the effort to complete the activity


 


2 The helper provides more than half the effort to complete the activity


 


1 Dependent.  The helper does all the effort to complete an activity 


 


7 Patient refused to complete or attempt activity


 


9 The patient did not perform the activity before the current illness or injury


 


88 Not attempted due to Medical conditions or safety concerns








Transfers (B, C, W/C) (FIM):  5


Scootin


Rollin


Supine to/from Sit:  5 (needs instruction for all tye sup to sit)


Sit to/from Stand:  5





Weight Bearing


Weight Bearing Restriction:  Weight Bearing/Tolerated


Location Restriction:  R LE





Gait Training


Does the Patient Walk?:  Yes


Gait (FIM):  5


Distance (FIM):  3=150 ft (x2)


Gait Level of Assist:  5


Gait Persons Needed:  1


Gait Assistive Device:  FWW


emphasized even step length as pt. is step to gait pattern





Wheelchair Training


Does the Pt Use a Wheelchair?:  No





Stair Training


 Stair Training: Handrails/:  2 handrails


Stairs (FIM):  2


#of Steps:  4


Stairs:  Pattern:  Step to


Level of Assist:  4


instruction for sequence and wt bearing etc





Exercises


Supine Ex:  Bridging, Ankle pumps, Quad Set, Rolling, Glut sets, Heel Slides, 

Short Arc Quads, Scooting, Straight leg raise, Hip abd/add


Supine Reps:  15





Assessment


Current Status:  Good Progress


edema and anxiety limit pts progress, pt. will likely need home modifications 

or 2-3 hrs of assist at home as she is at risk for falls





PT Short Term Goals


Short Term Goals


Time Frame:  2017


Transfers (B,C,W/C) (FIM):  4


Gait (FIM):  4


Gait Distance Comment:  150'


Gait Level of Assist:  4


Gait Assistive Device:  FWW


Wheelchair Distance:  50'





PT Long Term Goals


Long Term Goals


PT Long Term Goals Time Frame:  2017


Transfers (B,C,W/C) (FIM):  5 (met 17)


Sit to Lying (QC):  4 (met 17)


Lying-Sitting on Side/Bed(QC):  4 (met 17)


Sit to Stand (QC):  4 (met 17)


Rollin


Roll Left to Right (QC):  4 (met 17)


Chair/Bed-to-Chair Xfer(QC):  4


Gait (FIM):  5


Distance:  200'


Walk 10 feet (QC):  4


Walk 10ft-Uneven Surface(QC):  4


Walk 50ft with 2 Turns (QC):  4


Walk 150 ft (QC):  4


Gait Level of Assist:  5


Gait Assistive Device:  FWW


Stairs (FIM):  2 (met 17)


# of Steps:  4 (met 17)


1 Step (curb) (QC):  4 (met 17)


4 Steps (QC):  4 (met 17)


12 Steps (QC):  88


Stairs Level Of Assist:  4 (met 17)





PT Plan


Treatment/Plan


Treatment Plan:  Continue Plan of Care


Treatment Plan:  Bed Mobility, Education, Functional Activity Ashley, Functional 

Strength, Group Therapy, Gait, Safety, Therapeutic Exercise, Transfers


Treatment Duration:  2017


Frequency:  At least 5-7 days/Wk (IRF)


Estimated Hrs Per Day:  1.5 hours per day


Patient and/or Family Agrees t:  Yes





Safety Risks/Education


Patient Education:  Gait Training, Transfer Techniques, Steps, Correct 

Positioning, Disease Process, Safety Issues


Teaching Recipient:  Patient


Teaching Methods:  Demonstration, Discussion


Response to Teaching:  Verbalize Understanding, Return Demonstration, 

Reinforcement Needed





Time/GCodes


Time In:  800


Time Out:  900


Total Billed Treatment Time:  60


Total Billed Treatment


1,GT25m,EX20m,FA15m


G Codes Necessary:  DANIELLA Torres PTA 2017 09:01

## 2017-07-13 NOTE — OCCUPATIONAL THER DAILY NOTE
OT Current Status-Daily Note


Subjective


Pt alert, sitting in w/c sitting at sink.  Pt agreed to therapy.  C/o edema and 

anxiety.





Mental Status/Objective


Patient Orientation:  Person, Place, Time, Situation


              Functional [place] Measure


0=Not Assessed/NA   4=Minimal Assistance


1=Total Assistance   5=Supervision or Setup


2=Maximal Assistance   6=Modified [place]


3=Moderate Assistance   7=Complete [place]





ADL-Treatment


              Functional [place] Measure


0=Not Assessed/NA   4=Minimal Assistance


1=Total Assistance   5=Supervision or Setup


2=Maximal Assistance   6=Modified [place]


3=Moderate Assistance   7=Complete IndependenceIRFPAI Quality Coding Scale











6 Independent with activity with or without an assistive device


 


5  Patient requires set up or clean up by helper.  Patient completes activity  

by  themselves


 


4 Supervision or touching assist (CGA). [place] provide cues , steadying assist


 


3 The helper provides less than half the effort to complete the activity


 


2 The helper provides more than half the effort to complete the activity


 


1 Dependent.  The helper does all the effort to complete an activity 


 


7 Patient refused to complete or attempt activity


 


9 The patient did not perform the activity before the current illness or injury


 


88 Not attempted due to Medical conditions or safety concerns








Eating (FIM):  6


Grooming (FIM):  5 (Sitting at sink, pt able to complete all grooming by self.)





Other Treatment


Pt completed arm strengthening exercises when maneuvering w/c throughout 

hospital.  Pt was able to use B UE and B LE to maneuver over thresholds, around 

corners and through doors.  Pt did required multiple breaks due c/o pain and 

fatigue of L knee and hip.  Pt was able to tolerate well with UE's.  Back in 

room, pt ambulated to bathroom and transferred to toilet with CGA using FWW.  

Pt was able to manipulate clothing and cleanse self with CGA.  Pt then 

ambulated back to recliner with FWW.  Pt ordered lunch.  Pt demonstrated 

ability to use utensils to cut food and feed self.  Pt was able to open 

packages though did have difficulty completing task.  After therapy, pt sitting 

in recliner eating lunch.  Call light and phone in reach.  All needs met in 

room.





OT Short Term Goals


Short Term Goals


Time Frame:  2017


Bathing(FIM):  3


Lower Body Dressing(FIM):  3


Toileting(FIM):  3


Transfers (B,C,W/C) (FIM):  4


Shower Transfer(FIM):  4


Additional Short Term Goals:  1-Demonstrate ADL Tasks, 2-Verbalize Understanding

, 3-ImproveStrength/Ashley


1=Demonstrate adherence to instructed precautions during ADL tasks.


2=Patient will verbalize/demonstrate understanding of assistive devices/

modifications for ADL.


3=Patient will improve strength/tolerance for activity to enable patient to 

perform ADL's.





OT Long Term Goals


Long Term Goals


Time Frame:  2017


Eating (FIM):  6


Eating (QC):  6


Groomin


Oral Hygiene (QC):  6


Bathing(FIM):  5


Shower/Bathe Self (QC):  5


Upper Body Dressing(FIM):  5


Upper Body Dressing (QC):  5


Lower Body Dressing(FIM):  5


Lower Body Dressing (QC):  5


On/Off Footwear (QC):  5


Toileting(FIM):  6


Toileting Hygiene (QC):  6


Toilet/Commode Transfer(FIM):  6


Toilet/Commode Transfer (QC):  6


Shower Transfer(FIM):  5


Additional Goals:  1-Demonstrate ADL Tasks, 2-Verbalize Understanding, 3-

ImproveStrength/Ashley


1=Demonstrate adherence to instructed precautions during ADL tasks.


2=Patient will verbalize/demonstrate understanding of assistive devices/

modifications for ADL.


3=Patient will improve strength/tolerance for activity to enable patient to 

perform ADL's.





OT Education/Plan


Discharge Recommendations


Plan/Recommendations:  Continue POC





Treatment Plan/Plan of Care


Patient would benefit from OT for education, treatment and training to promote 

independence in ADL's, mobility, safety and/or upper extremity function for ADL'

s.


Plan of Care:  ADL Retraining, Functional Mobility, Group Exercise/Act as Ind, 

UE Funct Exercise/Act


Treatment Duration:  2017


Frequency:  Twice Daily


Estimated Hrs Per Day:  1.5 hours per day


Agreement:  Yes


Rehab Potential:  Fair





Time/GCodes


Start Time:  11:05


Stop Time:  12:05


Total Time Billed (hr/min):  6060


Billed Treatment Time


1 visit-FA 2 (30 min)  EX 2 (30 min)











KHADAR GARCIA 2017 13:23

## 2017-07-13 NOTE — OCCUPATIONAL THER DAILY NOTE
OT Current Status-Daily Note


Subjective


Pt very highly anxious about diarrhea and LE edema.  Pt also anxious about 

discharge planning and all the advice that she has been given. Pt agreed to 

therapy. Daughter entered room and attempted to calm pt down.  Daughter went to 

leave and came back in with  to discuss discharge planning.





Mental Status/Objective


              Functional [place] Measure


0=Not Assessed/NA   4=Minimal Assistance


1=Total Assistance   5=Supervision or Setup


2=Maximal Assistance   6=Modified [place]


3=Moderate Assistance   7=Complete [place]





ADL-Treatment


              Functional [place] Measure


0=Not Assessed/NA   4=Minimal Assistance


1=Total Assistance   5=Supervision or Setup


2=Maximal Assistance   6=Modified [place]


3=Moderate Assistance   7=Complete IndependenceIRFPAI Quality Coding Scale











6 Independent with activity with or without an assistive device


 


5  Patient requires set up or clean up by helper.  Patient completes activity  

by  themselves


 


4 Supervision or touching assist (CGA). [place] provide cues , steadying assist


 


3 The helper provides less than half the effort to complete the activity


 


2 The helper provides more than half the effort to complete the activity


 


1 Dependent.  The helper does all the effort to complete an activity 


 


7 Patient refused to complete or attempt activity


 


9 The patient did not perform the activity before the current illness or injury


 


88 Not attempted due to Medical conditions or safety concerns











Other Treatment


BAXTER attempted to engage pt in therapy.  Pt stated that she started the day off 

in a good mood and then became highly anxious.  Pt stated that she really wasn'

t up to doing anything.  BAXTER continued to encourage pt to brush teeth or clean 

up a little.  This is when daughter came into room and pt started to discuss 

concerns with daughter.  Daughter tried to calm pt and had stated that both 

daughters had already discussed discharge planning and was getting things 

prepared.  This is when daughter left the room and came back with social 

worker.  Left pt in care of  and daughter.





OT Short Term Goals


Short Term Goals


Time Frame:  2017


Bathing(FIM):  3


Lower Body Dressing(FIM):  3


Toileting(FIM):  3


Transfers (B,C,W/C) (FIM):  4


Shower Transfer(FIM):  4


Additional Short Term Goals:  1-Demonstrate ADL Tasks, 2-Verbalize Understanding

, 3-ImproveStrength/Ashley


1=Demonstrate adherence to instructed precautions during ADL tasks.


2=Patient will verbalize/demonstrate understanding of assistive devices/

modifications for ADL.


3=Patient will improve strength/tolerance for activity to enable patient to 

perform ADL's.





OT Long Term Goals


Long Term Goals


Time Frame:  2017


Eating (FIM):  6


Eating (QC):  6


Groomin


Oral Hygiene (QC):  6


Bathing(FIM):  5


Shower/Bathe Self (QC):  5


Upper Body Dressing(FIM):  5


Upper Body Dressing (QC):  5


Lower Body Dressing(FIM):  5


Lower Body Dressing (QC):  5


On/Off Footwear (QC):  5


Toileting(FIM):  6


Toileting Hygiene (QC):  6


Toilet/Commode Transfer(FIM):  6


Toilet/Commode Transfer (QC):  6


Shower Transfer(FIM):  5


Additional Goals:  1-Demonstrate ADL Tasks, 2-Verbalize Understanding, 3-

ImproveStrength/Ashley


1=Demonstrate adherence to instructed precautions during ADL tasks.


2=Patient will verbalize/demonstrate understanding of assistive devices/

modifications for ADL.


3=Patient will improve strength/tolerance for activity to enable patient to 

perform ADL's.





OT Education/Plan


Discharge Recommendations


Plan/Recommendations:  Continue POC





Treatment Plan/Plan of Care


Patient would benefit from OT for education, treatment and training to promote 

independence in ADL's, mobility, safety and/or upper extremity function for ADL'

s.


Plan of Care:  ADL Retraining, Functional Mobility, Group Exercise/Act as Ind, 

UE Funct Exercise/Act


Treatment Duration:  2017


Frequency:  Twice Daily


Estimated Hrs Per Day:  1.5 hours per day


Agreement:  Yes


Rehab Potential:  Fair





Time/GCodes


Start Time:  10:00


Stop Time:  10:30


Total Time Billed (hr/min):  30


Billed Treatment Time


1 visit-FA 2 (30 min)











KHADAR GARCIA 2017 13:20

## 2017-07-13 NOTE — PM & R (SOAP) PROGRESS NOTE
Subjective


Time Seen by Provider:  07:55


Subjective/Events-last exam


Patient was seen in her room this AM Patient CGA for Gait with walker.Patient c/

o edema in ankles Patient with 2+ pitting edema in ankles Patient declines 

Supprot hose due to binding.Serum Ambumin low which may be contributing to this 

Legs elevated in Recliner at this time.Appreciate DR Garrison note.


Review of Systems


Cardiovascular:  Edema





Objective


Exam


Last Set of Vital Signs





Vital Signs








  Date Time  Temp Pulse Resp B/P (MAP) Pulse Ox O2 Delivery O2 Flow Rate FiO2


 


7/13/17 05:31 97.9 87 18 137/66 97 Room Air  





Capillary Refill : NONENONE


I&O











Intake and Output 


 


 7/13/17





 00:00


 


Intake Total 1040 ml


 


Output Total 350 ml


 


Balance 690 ml


 


 


 


Intake Oral 1040 ml


 


Output Urine Total 350 ml


 


# Voids 1


 


# Bowel Movements 10








General:  Alert, Oriented X3, Cooperative, No Acute Distress


HEENT:  Atraumatic, PERRLA, EOMI, Mucous Memb Moist/Pink


Neck:  Supple, No JVD


Lungs:  Clear to Auscultation


Heart:  Regular Rate


Abdomen:  Normal Bowel Sounds, Soft, No Tenderness


Extremities:  Other (2+ pitting edema both ankles)


Neuro:  Other (guarding rt hip has functional strength distal RT Leg)





Results


Lab


Laboratory Tests


7/11/17 09:28: 


White Blood Count 7.4, Red Blood Count 3.47L, Hemoglobin 10.4L, Hematocrit 32L, 

Mean Corpuscular Volume 91, Mean Corpuscular Hemoglobin 30, Mean Corpuscular 

Hemoglobin Concent 33, Red Cell Distribution Width 14.5, Platelet Count 423H, 

Mean Platelet Volume 8.4, Neutrophils (%) (Auto) 75, Lymphocytes (%) (Auto) 9L, 

Monocytes (%) (Auto) 14H, Eosinophils (%) (Auto) 1, Basophils (%) (Auto) 1, 

Neutrophils # (Auto) 5.6, Lymphocytes # (Auto) 0.7L, Monocytes # (Auto) 1.1H, 

Eosinophils # (Auto) 0.1, Basophils # (Auto) 0.0, Sodium Level 134L, Potassium 

Level 4.8, Chloride Level 111H, Carbon Dioxide Level 18L, Anion Gap 5, Blood 

Urea Nitrogen 18, Creatinine 0.91, Estimat Glomerular Filtration Rate 59, BUN/

Creatinine Ratio 20, Glucose Level 145H, Calcium Level 8.3L, Total Bilirubin 0.5

, Aspartate Amino Transf (AST/SGOT) 13, Alanine Aminotransferase (ALT/SGPT) 9, 

Alkaline Phosphatase 82, Total Protein 6.0L, Albumin 2.2L


7/12/17 03:39: Stool Occult Blood Immunoassay POSITIVEH





Microbiology


7/9/17 Stool Culture - Preliminary, Resulted


         No stool pathogens as yet isolated.  ...





Assessment/Plan


Assessment


Displaced rt femoral neck frx  s/p Bipolar Billy arthroplasty rt hip DR Goodrich 

7/4/17


Postop anemia


Chronic diarrhea-associated with Stool +for OB times one-DR Jo following


Hypoalbuminemia


Peripheral edema


Postop DVT prophylaxis on Lovenox Sub CUT


Hypokalemia replaced





Plan


Continue PT/OT 


Checked Labs-


Team Conference held yesterday -See report for full functional; update and POC 

and ELOS


DR Jo Manageing Diarrhea-He will consider colonoscopy at a later time 

perhaps as an outpatient


D/C Questran as patients request


-Immodium ordered


Probiotic d/cd as patients request=She feels that it may be contributing to her 

loose stools


See orders.


Elevate legs as much as possible


Ace wraps legs -see orders











SHIN DEL VALLE MD Jul 13, 2017 08:23

## 2017-07-14 VITALS — DIASTOLIC BLOOD PRESSURE: 73 MMHG | SYSTOLIC BLOOD PRESSURE: 119 MMHG

## 2017-07-14 VITALS — SYSTOLIC BLOOD PRESSURE: 102 MMHG | DIASTOLIC BLOOD PRESSURE: 61 MMHG

## 2017-07-14 VITALS — SYSTOLIC BLOOD PRESSURE: 91 MMHG | DIASTOLIC BLOOD PRESSURE: 53 MMHG

## 2017-07-14 RX ADMIN — HYDROCODONE BITARTRATE AND ACETAMINOPHEN PRN TAB: 5; 325 TABLET ORAL at 03:06

## 2017-07-14 RX ADMIN — IBUPROFEN PRN MG: 200 TABLET, FILM COATED ORAL at 22:06

## 2017-07-14 RX ADMIN — ENOXAPARIN SODIUM SCH MG: 100 INJECTION SUBCUTANEOUS at 09:05

## 2017-07-14 RX ADMIN — LORATADINE SCH MG: 10 TABLET ORAL at 09:05

## 2017-07-14 RX ADMIN — DIPHENOXYLATE HYDROCHLORIDE AND ATROPINE SULFATE PRN EA: 2.5; .025 TABLET ORAL at 09:05

## 2017-07-14 RX ADMIN — HYDROCORTISONE SCH APPLIC: 25 CREAM TOPICAL at 09:06

## 2017-07-14 RX ADMIN — ANORECTAL OINTMENT SCH APPLIC: 15.7; .44; 24; 20.6 OINTMENT TOPICAL at 09:06

## 2017-07-14 RX ADMIN — ANORECTAL OINTMENT SCH APPLIC: 15.7; .44; 24; 20.6 OINTMENT TOPICAL at 20:39

## 2017-07-14 RX ADMIN — ASPIRIN SCH MG: 325 TABLET, COATED ORAL at 09:05

## 2017-07-14 RX ADMIN — DIPHENOXYLATE HYDROCHLORIDE AND ATROPINE SULFATE PRN EA: 2.5; .025 TABLET ORAL at 16:56

## 2017-07-14 RX ADMIN — POTASSIUM CHLORIDE SCH MEQ: 1500 TABLET, EXTENDED RELEASE ORAL at 06:13

## 2017-07-14 RX ADMIN — Medication SCH OZ: at 09:05

## 2017-07-14 RX ADMIN — HYDROCORTISONE SCH APPLIC: 25 CREAM TOPICAL at 20:39

## 2017-07-14 RX ADMIN — LACTOBACILLUS TAB SCH TAB.CHEW: TAB at 06:13

## 2017-07-14 RX ADMIN — LACTOBACILLUS TAB SCH TAB.CHEW: TAB at 10:33

## 2017-07-14 RX ADMIN — LACTOBACILLUS TAB SCH TAB.CHEW: TAB at 16:51

## 2017-07-14 NOTE — PHYSICAL THERAPY DAILY NOTE
PT Daily Note-Current


Subjective


Pt. c/o pain in right knee that she has had for quite some time but has 

exacerbated with the hip problem. Agrees to Rx.  Negative dialogue, stating she 

is not better, wont be going home for a long time. This PTA stating factually 

that pt. is making improvement and progressing daily





Pain





   Numeric Pain Scale:  4


   Location:  Right


   Location Body Site:  Knee


   Pain Description:  Ache





Mental Status


Patient Orientation:  Normal For Age





Transfers


              Functional [place] Measure


0=Not Assessed/NA   4=Minimal Assistance


1=Total Assistance   5=Supervision or Setup


2=Maximal Assistance   6=Modified [place]


3=Moderate Assistance   7=Complete IndependenceIRFPAI Quality Coding Scale











6 Independent with activity with or without an assistive device


 


5  Patient requires set up or clean up by helper.  Patient completes activity  

by  themselves


 


4 Supervision or touching assist (CGA). [place] provide cues , steadying assist


 


3 The helper provides less than half the effort to complete the activity


 


2 The helper provides more than half the effort to complete the activity


 


1 Dependent.  The helper does all the effort to complete an activity 


 


7 Patient refused to complete or attempt activity


 


9 The patient did not perform the activity before the current illness or injury


 


88 Not attempted due to Medical conditions or safety concerns








Transfers (B, C, W/C) (FIM):  5


Scootin


Rollin


Supine to/from Sit:  5


Sit to/from Stand:  5


Bed to/from Chair:  5





Weight Bearing


Weight Bearing Restriction:  Weight Bearing/Tolerated





Gait Training


Does the Patient Walk?:  Yes


Gait (FIM):  5


Distance (FIM):  3=150 ft (x3)


Gait Level of Assist:  5


Gait Persons Needed:  1


Gait Assistive Device:  FWW


needs some cues for equal step length and alignment , head up etc





Stair Training


 Stair Training: Handrails/:  2 handrails


Stairs (FIM):  2


#of Steps:  4


Stairs:  Pattern:  Step to


Level of Assist:  4





Exercises


Supine Ex:  Ankle pumps, Quad Set, Rolling, Glut sets, Heel Slides, Short Arc 

Quads, Scooting, Straight leg raise, Hip abd/add


Supine Reps:  15





Treatments


toileted and washed up at sink with SBA.





Assessment


Current Status:  Good Progress


anxiety limits pts. concentration and progress





PT Short Term Goals


Short Term Goals


Time Frame:  2017


Transfers (B,C,W/C) (FIM):  4


Gait (FIM):  4


Gait Distance Comment:  150'


Gait Level of Assist:  4


Gait Assistive Device:  FWW


Wheelchair Distance:  50'





PT Long Term Goals


Long Term Goals


PT Long Term Goals Time Frame:  2017


Transfers (B,C,W/C) (FIM):  5 (met 17)


Sit to Lying (QC):  4 (met 17)


Lying-Sitting on Side/Bed(QC):  4 (met 17)


Sit to Stand (QC):  4 (met 17)


Rollin


Roll Left to Right (QC):  4 (met 17)


Chair/Bed-to-Chair Xfer(QC):  4


Gait (FIM):  5


Distance:  200'


Walk 10 feet (QC):  4


Walk 10ft-Uneven Surface(QC):  4


Walk 50ft with 2 Turns (QC):  4


Walk 150 ft (QC):  4


Gait Level of Assist:  5


Gait Assistive Device:  FWW


Stairs (FIM):  2 (met 17)


# of Steps:  4 (met 17)


1 Step (curb) (QC):  4 (met 17)


4 Steps (QC):  4 (met 17)


12 Steps (QC):  88


Stairs Level Of Assist:  4 (met 17)





PT Plan


Treatment/Plan


Treatment Plan:  Continue Plan of Care


Treatment Plan:  Bed Mobility, Education, Functional Activity Ashley, Functional 

Strength, Group Therapy, Gait, Safety, Therapeutic Exercise, Transfers


Treatment Duration:  2017


Frequency:  At least 5-7 days/Wk (IRF)


Estimated Hrs Per Day:  1.5 hours per day


Patient and/or Family Agrees t:  Yes





Safety Risks/Education


Patient Education:  Gait Training, Transfer Techniques, Steps, Correct 

Positioning, Safety Issues


Teaching Recipient:  Patient


Teaching Methods:  Demonstration, Discussion


Response to Teaching:  Verbalize Understanding, Return Demonstration, 

Reinforcement Needed





Time/GCodes


Time In:  800


Time Out:  900


Total Billed Treatment Time:  60


Total Billed Treatment


1,EX15m,FA25m,GT20m


G Codes Necessary:  DANIELLA Torres PTA 2017 09:10

## 2017-07-14 NOTE — OCCUPATIONAL THER DAILY NOTE
OT Current Status-Daily Note


Subjective


Pt sitting in chair, agrees to treatment. Pt states she is not feeling 

confident and is anxious about taking care of herself.





Mental Status/Objective


              Functional [place] Measure


0=Not Assessed/NA   4=Minimal Assistance


1=Total Assistance   5=Supervision or Setup


2=Maximal Assistance   6=Modified [place]


3=Moderate Assistance   7=Complete [place]





ADL-Treatment


Pt requests shower today. Sit to stand from chair with supervision. Gait to 

restroom with FWW, slow pace. Transfer to [facility] over toilet with SBA. Pt able to 

pull pants down and complete hygiene. Pt doffed Depends with minimal assistance 

in preparation for shower. Transfer to walk in shower with minimal assistance 

using grab bars, cues for safety. Seated bathing completed using hand held 

shower and long handled sponge. Pt bathed upper body, destiny area, and bilateral 

upper legs with SBA. Used long handled sponge to wash lower legs and feet. 

Stood with minimal assistance for balance while washing buttocks. Pt dried 

upper body with SBA. Used adaptive equipment to dry feet. Min assist to wash 

buttocks. Don pullover shirt with set up. Pt instructed in use of adaptive 

equipment for LE dressing. Pt used reacher to start Depends and shorts over 

feet with minimal assistance. Stood with CGA for balance during pant hike using 

FWW. Assist required to don AARON hose. Pt donned socks with minimal assistance 

and multiple verbal cues using sock aid. Pt requires frequent cues for use of 

adaptive equipment during LE dressing. Pt stood at sink to floss and brush 

teeth with SBA. Transfer to chair with SBA. Pt fatigues with activity and 

requires occasional rest breaks throughout treatment. Pt sitting in chair with 

needs met after session.


              Functional [place] Measure


0=Not Assessed/NA   4=Minimal Assistance


1=Total Assistance   5=Supervision or Setup


2=Maximal Assistance   6=Modified [place]


3=Moderate Assistance   7=Complete IndependenceIRFPAI Quality Coding Scale











6 Independent with activity with or without an assistive device


 


5  Patient requires set up or clean up by helper.  Patient completes activity  

by  themselves


 


4 Supervision or touching assist (CGA). [place] provide cues , steadying assist


 


3 The helper provides less than half the effort to complete the activity


 


2 The helper provides more than half the effort to complete the activity


 


1 Dependent.  The helper does all the effort to complete an activity 


 


7 Patient refused to complete or attempt activity


 


9 The patient did not perform the activity before the current illness or injury


 


88 Not attempted due to Medical conditions or safety concerns








Grooming (FIM):  5


Oral Hygiene (QC):  4


Bathing (FIM):  4


Shower/Bathe Self (QC):  3


Upper Body (FIM):  5


Upper Body Dressing (QC):  5


Lower Body Dressing (FIM):  4


Lower Body Dressing (QC):  3


On/Off Footwear (QC):  3


Toilet/Commode Transfer (FIM):  5


Toilet Transfer (QC):  4


Shower Transfer(FIM):  4





Education


OT Patient Education:  Modified ADL techniques


Teaching Recipient:  Patient


Teaching Methods:  Demonstration, Discussion


Response to Teaching:  Verbalize Understanding, Reinforcement Needed





OT Short Term Goals


Short Term Goals


Time Frame:  2017


Bathing(FIM):  3


Lower Body Dressing(FIM):  3


Toileting(FIM):  3


Transfers (B,C,W/C) (FIM):  4


Shower Transfer(FIM):  4


Additional Short Term Goals:  1-Demonstrate ADL Tasks, 2-Verbalize Understanding

, 3-ImproveStrength/Ashley


1=Demonstrate adherence to instructed precautions during ADL tasks.


2=Patient will verbalize/demonstrate understanding of assistive devices/

modifications for ADL.


3=Patient will improve strength/tolerance for activity to enable patient to 

perform ADL's.





OT Long Term Goals


Long Term Goals


Time Frame:  2017


Eating (FIM):  6


Eating (QC):  6


Groomin


Oral Hygiene (QC):  6


Bathing(FIM):  5


Shower/Bathe Self (QC):  5


Upper Body Dressing(FIM):  5


Upper Body Dressing (QC):  5


Lower Body Dressing(FIM):  5


Lower Body Dressing (QC):  5


On/Off Footwear (QC):  5


Toileting(FIM):  6


Toileting Hygiene (QC):  6


Toilet/Commode Transfer(FIM):  6


Toilet/Commode Transfer (QC):  6


Shower Transfer(FIM):  5


Additional Goals:  1-Demonstrate ADL Tasks, 2-Verbalize Understanding, 3-

ImproveStrength/Ashley


1=Demonstrate adherence to instructed precautions during ADL tasks.


2=Patient will verbalize/demonstrate understanding of assistive devices/

modifications for ADL.


3=Patient will improve strength/tolerance for activity to enable patient to 

perform ADL's.





OT Education/Plan


Discharge Recommendations


Plan/Recommendations:  Continue POC





Treatment Plan/Plan of Care


Patient would benefit from OT for education, treatment and training to promote 

independence in ADL's, mobility, safety and/or upper extremity function for ADL'

s.


Plan of Care:  ADL Retraining, Functional Mobility, Group Exercise/Act as Ind, 

UE Funct Exercise/Act


Treatment Duration:  2017


Frequency:  Twice Daily


Estimated Hrs Per Day:  1.5 hours per day


Agreement:  Yes


Rehab Potential:  Fair





Time/GCodes


Start Time:  09:05


Stop Time:  10:05


Total Time Billed (hr/min):  60


Billed Treatment Time


1 visit, ADLx4(60minutes)











RADHA HOWARD OT 2017 10:29

## 2017-07-14 NOTE — PM & R (SOAP) PROGRESS NOTE
Subjective


Time Seen by Provider:  08:10


Subjective/Events-last exam


Patient was seen in her room this AM Explained to patient that supplement being 

provided to hopefully increase seurm albumin and thus decrease Peripheral edema 

Patient declines support hose Nursing went over POC with patient 

yesterday.Discussed case with RN yesterday


Review of Systems


Cardiovascular:  Edema





Objective


Exam


Last Set of Vital Signs





Vital Signs








  Date Time  Temp Pulse Resp B/P (MAP) Pulse Ox O2 Delivery O2 Flow Rate FiO2


 


7/14/17 05:00 98.9 104 24 91/53 97 Room Air  





Capillary Refill : NONENONE


I&O











Intake and Output 


 


 7/14/17





 00:00


 


Intake Total 1080 ml


 


Balance 1080 ml


 


 


 


Intake Oral 1080 ml


 


# Voids 10


 


# Bowel Movements 8








General:  Alert, Oriented X3, Cooperative, No Acute Distress


HEENT:  Atraumatic, PERRLA, EOMI, Mucous Memb Moist/Pink


Neck:  Supple, No JVD


Lungs:  Clear to Auscultation


Heart:  Regular Rate


Abdomen:  Normal Bowel Sounds, Soft, No Tenderness


Extremities:  Other (edema decreasing with elevation)


Neuro:  Other (guarding rt hip has functional strength distal RT Leg)





Results


Lab


Laboratory Tests


7/11/17 09:28: 


White Blood Count 7.4, Red Blood Count 3.47L, Hemoglobin 10.4L, Hematocrit 32L, 

Mean Corpuscular Volume 91, Mean Corpuscular Hemoglobin 30, Mean Corpuscular 

Hemoglobin Concent 33, Red Cell Distribution Width 14.5, Platelet Count 423H, 

Mean Platelet Volume 8.4, Neutrophils (%) (Auto) 75, Lymphocytes (%) (Auto) 9L, 

Monocytes (%) (Auto) 14H, Eosinophils (%) (Auto) 1, Basophils (%) (Auto) 1, 

Neutrophils # (Auto) 5.6, Lymphocytes # (Auto) 0.7L, Monocytes # (Auto) 1.1H, 

Eosinophils # (Auto) 0.1, Basophils # (Auto) 0.0, Sodium Level 134L, Potassium 

Level 4.8, Chloride Level 111H, Carbon Dioxide Level 18L, Anion Gap 5, Blood 

Urea Nitrogen 18, Creatinine 0.91, Estimat Glomerular Filtration Rate 59, BUN/

Creatinine Ratio 20, Glucose Level 145H, Calcium Level 8.3L, Total Bilirubin 0.5

, Aspartate Amino Transf (AST/SGOT) 13, Alanine Aminotransferase (ALT/SGPT) 9, 

Alkaline Phosphatase 82, Total Protein 6.0L, Albumin 2.2L


7/12/17 03:39: Stool Occult Blood Immunoassay POSITIVEH





Microbiology


7/9/17 Stool Culture - Preliminary, Resulted


         No stool pathogens as yet isolated.  ...





Assessment/Plan


Assessment


Displaced rt femoral neck frx  s/p Bipolar Billy arthroplasty rt hip DR Goodrich 

7/4/17


Postop anemia


Chronic diarrhea-associated with Stool +for OB times one-DR Jo following


Hypoalbuminemia


Peripheral edema-associated with Hypoalbuminemia


Postop DVT prophylaxis on Lovenox Sub CUT


Hypokalemia replaced





Plan


Continue PT/OT 


Checked Labs-


Team Conference held 7-12-17 -See report for full functional; update and POC 

and ELOS


DR Jo Manageing Diarrhea-He will consider colonoscopy at a later time 

perhaps as an outpatient


D/C Questran as patients request


-Immodium ordered


Probiotic d/cd as patients request=She feels that it may be contributing to her 

loose stools


See orders.


Elevate legs as much as possible


Ace wraps legs -see orders


Nutritional supplement has with morning meal











SHIN DEL VALLE MD Jul 14, 2017 08:39

## 2017-07-14 NOTE — THERAPY GROUP DAILY NOTE
Therapy Daily Group Note


Patient Education Topic


Other List Below (gait and assistive devices)





Exercises


LE Seated Exercise, UE Exercise





Other/Notes


Pt. attended group PT OT session. Pt. ambulated to and from with FWW and SBA. 

Pt. was pleasant and socialized well with others introducing herself and 

sharing his "favorite meal" with others.  U&L extremity ther ex was done today 

lead by HANANE Roblero with pts. reading from illustrated card instructions with 

all pts participating . Pts also were all distributed yellow Tband for UE 

exercise  by OT. Education was offered focusing on gait pattern and 

use of various assistive devices.  Pt. returned to room, toileted and up in 

chair after, call bell at hand.  pt. appeared to enjoy group a great deal 

sharing and laughing and making conversation.


Start Time:  13:00


Stop Time:  14:15


Total Billed Treatment Time:  75


Total Billed Treatment


1,GRP











DANIELLA SHELTON PTA Jul 14, 2017 14:47

## 2017-07-15 VITALS — SYSTOLIC BLOOD PRESSURE: 114 MMHG | DIASTOLIC BLOOD PRESSURE: 65 MMHG

## 2017-07-15 VITALS — SYSTOLIC BLOOD PRESSURE: 104 MMHG | DIASTOLIC BLOOD PRESSURE: 63 MMHG

## 2017-07-15 RX ADMIN — ANORECTAL OINTMENT SCH APPLIC: 15.7; .44; 24; 20.6 OINTMENT TOPICAL at 20:45

## 2017-07-15 RX ADMIN — LACTOBACILLUS TAB SCH TAB.CHEW: TAB at 13:04

## 2017-07-15 RX ADMIN — POTASSIUM CHLORIDE SCH MEQ: 1500 TABLET, EXTENDED RELEASE ORAL at 06:21

## 2017-07-15 RX ADMIN — LORATADINE SCH MG: 10 TABLET ORAL at 08:38

## 2017-07-15 RX ADMIN — HYDROCORTISONE SCH APPLIC: 25 CREAM TOPICAL at 08:40

## 2017-07-15 RX ADMIN — Medication SCH OZ: at 08:39

## 2017-07-15 RX ADMIN — HYDROCORTISONE SCH APPLIC: 25 CREAM TOPICAL at 20:46

## 2017-07-15 RX ADMIN — ENOXAPARIN SODIUM SCH MG: 100 INJECTION SUBCUTANEOUS at 08:38

## 2017-07-15 RX ADMIN — LACTOBACILLUS TAB SCH TAB.CHEW: TAB at 16:57

## 2017-07-15 RX ADMIN — ANORECTAL OINTMENT SCH APPLIC: 15.7; .44; 24; 20.6 OINTMENT TOPICAL at 08:40

## 2017-07-15 RX ADMIN — ASPIRIN SCH MG: 325 TABLET, COATED ORAL at 08:38

## 2017-07-15 RX ADMIN — IBUPROFEN PRN MG: 200 TABLET, FILM COATED ORAL at 13:04

## 2017-07-15 RX ADMIN — LACTOBACILLUS TAB SCH TAB.CHEW: TAB at 06:21

## 2017-07-15 NOTE — PHYSICAL THERAPY DAILY NOTE
PT Daily Note-Current


Subjective


Pt. in bathroom on toilet and states she has been here most of the night after 

eating broccoli and then having a lot of gas which she kept thinking might be 

BM.





Pain





   Numeric Pain Scale:  0-No Pain





Appearance


edema in LEs down a little





Mental Status


Patient Orientation:  Normal For Age


anxiety continues





Transfers


              Functional [place] Measure


0=Not Assessed/NA   4=Minimal Assistance


1=Total Assistance   5=Supervision or Setup


2=Maximal Assistance   6=Modified [place]


3=Moderate Assistance   7=Complete IndependenceIRFPAI Quality Coding Scale











6 Independent with activity with or without an assistive device


 


5  Patient requires set up or clean up by helper.  Patient completes activity  

by  themselves


 


4 Supervision or touching assist (CGA). [place] provide cues , steadying assist


 


3 The helper provides less than half the effort to complete the activity


 


2 The helper provides more than half the effort to complete the activity


 


1 Dependent.  The helper does all the effort to complete an activity 


 


7 Patient refused to complete or attempt activity


 


9 The patient did not perform the activity before the current illness or injury


 


88 Not attempted due to Medical conditions or safety concerns








Transfers (B, C, W/C) (FIM):  5


Scootin


Rollin


Supine to/from Sit:  6


Sit to/from Stand:  5





Gait Training


Does the Patient Walk?:  Yes


Gait (FIM):  5


Distance (FIM):  3=150 ft (x2)


Gait Level of Assist:  5


Gait Persons Needed:  1


Gait Assistive Device:  FWW





Exercises


NuStep Minutes:  10


 NuStep Workload:  2





Assessment


Current Status:  Good Progress


improving indep and funct mob





PT Short Term Goals


Short Term Goals


Time Frame:  2017


Transfers (B,C,W/C) (FIM):  4


Gait (FIM):  4


Gait Distance Comment:  150'


Gait Level of Assist:  4


Gait Assistive Device:  FWW


Wheelchair Distance:  50'





PT Long Term Goals


Long Term Goals


PT Long Term Goals Time Frame:  2017


Transfers (B,C,W/C) (FIM):  5 (met 17)


Sit to Lying (QC):  4 (met 17)


Lying-Sitting on Side/Bed(QC):  4 (met 17)


Sit to Stand (QC):  4 (met 17)


Rollin


Roll Left to Right (QC):  4 (met 17)


Chair/Bed-to-Chair Xfer(QC):  4


Gait (FIM):  5


Distance:  200'


Walk 10 feet (QC):  4


Walk 10ft-Uneven Surface(QC):  4


Walk 50ft with 2 Turns (QC):  4


Walk 150 ft (QC):  4


Gait Level of Assist:  5


Gait Assistive Device:  FWW


Stairs (FIM):  2 (met 17)


# of Steps:  4 (met 17)


1 Step (curb) (QC):  4 (met 17)


4 Steps (QC):  4 (met 17)


12 Steps (QC):  88


Stairs Level Of Assist:  4 (met 17)





PT Plan


Treatment/Plan


Treatment Plan:  Continue Plan of Care


Treatment Plan:  Bed Mobility, Education, Functional Activity Ashley, Functional 

Strength, Group Therapy, Gait, Safety, Therapeutic Exercise, Transfers


Treatment Duration:  2017


Frequency:  At least 5-7 days/Wk (IRF)


Estimated Hrs Per Day:  1.5 hours per day


Patient and/or Family Agrees t:  Yes





Safety Risks/Education


Patient Education:  Gait Training, Transfer Techniques, Correct Positioning, 

Disease Process, Safety Issues


Teaching Recipient:  Patient


Teaching Methods:  Demonstration, Discussion


Response to Teaching:  Verbalize Understanding, Return Demonstration, 

Reinforcement Needed





Time/GCodes


Time In:  855


Time Out:  915


Total Billed Treatment Time:  20


Total Billed Treatment


1,FA20m


G Codes Necessary:  DANIELLA Torres PTA Jul 15, 2017 11:39

## 2017-07-16 VITALS — DIASTOLIC BLOOD PRESSURE: 63 MMHG | SYSTOLIC BLOOD PRESSURE: 118 MMHG

## 2017-07-16 VITALS — SYSTOLIC BLOOD PRESSURE: 124 MMHG | DIASTOLIC BLOOD PRESSURE: 66 MMHG

## 2017-07-16 RX ADMIN — LACTOBACILLUS TAB SCH TAB.CHEW: TAB at 11:06

## 2017-07-16 RX ADMIN — LORAZEPAM PRN MG: 0.5 TABLET ORAL at 05:19

## 2017-07-16 RX ADMIN — IBUPROFEN PRN MG: 200 TABLET, FILM COATED ORAL at 08:48

## 2017-07-16 RX ADMIN — LORATADINE SCH MG: 10 TABLET ORAL at 08:48

## 2017-07-16 RX ADMIN — ANORECTAL OINTMENT SCH APPLIC: 15.7; .44; 24; 20.6 OINTMENT TOPICAL at 08:49

## 2017-07-16 RX ADMIN — DIPHENOXYLATE HYDROCHLORIDE AND ATROPINE SULFATE PRN EA: 2.5; .025 TABLET ORAL at 11:06

## 2017-07-16 RX ADMIN — POTASSIUM CHLORIDE SCH MEQ: 1500 TABLET, EXTENDED RELEASE ORAL at 06:23

## 2017-07-16 RX ADMIN — ENOXAPARIN SODIUM SCH MG: 100 INJECTION SUBCUTANEOUS at 08:47

## 2017-07-16 RX ADMIN — ANORECTAL OINTMENT SCH APPLIC: 15.7; .44; 24; 20.6 OINTMENT TOPICAL at 21:25

## 2017-07-16 RX ADMIN — ASPIRIN SCH MG: 325 TABLET, COATED ORAL at 08:48

## 2017-07-16 RX ADMIN — LACTOBACILLUS TAB SCH TAB.CHEW: TAB at 06:23

## 2017-07-16 RX ADMIN — ONDANSETRON PRN MG: 4 TABLET, ORALLY DISINTEGRATING ORAL at 05:18

## 2017-07-16 RX ADMIN — LACTOBACILLUS TAB SCH TAB.CHEW: TAB at 16:58

## 2017-07-16 RX ADMIN — HYDROCORTISONE SCH APPLIC: 25 CREAM TOPICAL at 08:49

## 2017-07-16 RX ADMIN — Medication SCH OZ: at 08:48

## 2017-07-16 RX ADMIN — HYDROCORTISONE SCH APPLIC: 25 CREAM TOPICAL at 21:25

## 2017-07-17 VITALS — SYSTOLIC BLOOD PRESSURE: 100 MMHG | DIASTOLIC BLOOD PRESSURE: 60 MMHG

## 2017-07-17 VITALS — SYSTOLIC BLOOD PRESSURE: 119 MMHG | DIASTOLIC BLOOD PRESSURE: 78 MMHG

## 2017-07-17 RX ADMIN — HYDROCORTISONE SCH APPLIC: 25 CREAM TOPICAL at 09:14

## 2017-07-17 RX ADMIN — ASPIRIN SCH MG: 325 TABLET, COATED ORAL at 09:09

## 2017-07-17 RX ADMIN — Medication SCH OZ: at 09:10

## 2017-07-17 RX ADMIN — HYDROCORTISONE SCH APPLIC: 25 CREAM TOPICAL at 20:26

## 2017-07-17 RX ADMIN — DIPHENOXYLATE HYDROCHLORIDE AND ATROPINE SULFATE PRN EA: 2.5; .025 TABLET ORAL at 20:26

## 2017-07-17 RX ADMIN — DIPHENOXYLATE HYDROCHLORIDE AND ATROPINE SULFATE PRN EA: 2.5; .025 TABLET ORAL at 01:43

## 2017-07-17 RX ADMIN — LACTOBACILLUS TAB SCH TAB.CHEW: TAB at 10:53

## 2017-07-17 RX ADMIN — HYDROCODONE BITARTRATE AND ACETAMINOPHEN PRN TAB: 5; 325 TABLET ORAL at 09:16

## 2017-07-17 RX ADMIN — IBUPROFEN PRN MG: 200 TABLET, FILM COATED ORAL at 05:58

## 2017-07-17 RX ADMIN — LOPERAMIDE HYDROCHLORIDE SCH MG: 2 CAPSULE ORAL at 21:09

## 2017-07-17 RX ADMIN — ENOXAPARIN SODIUM SCH MG: 100 INJECTION SUBCUTANEOUS at 09:09

## 2017-07-17 RX ADMIN — IBUPROFEN PRN MG: 200 TABLET, FILM COATED ORAL at 21:28

## 2017-07-17 RX ADMIN — POTASSIUM CHLORIDE SCH MEQ: 1500 TABLET, EXTENDED RELEASE ORAL at 05:58

## 2017-07-17 RX ADMIN — ANORECTAL OINTMENT SCH APPLIC: 15.7; .44; 24; 20.6 OINTMENT TOPICAL at 20:26

## 2017-07-17 RX ADMIN — ANORECTAL OINTMENT SCH APPLIC: 15.7; .44; 24; 20.6 OINTMENT TOPICAL at 09:14

## 2017-07-17 RX ADMIN — LACTOBACILLUS TAB SCH TAB.CHEW: TAB at 05:58

## 2017-07-17 RX ADMIN — LORATADINE SCH MG: 10 TABLET ORAL at 09:09

## 2017-07-17 RX ADMIN — LACTOBACILLUS TAB SCH TAB.CHEW: TAB at 16:39

## 2017-07-17 NOTE — THERAPY GROUP DAILY NOTE
Therapy Daily Group Note


Patient Education Topic


Fall Prevention





Exercises


Other





Other/Notes


Pt was an active participant in OT/PT group.  She walked CGA, FWW and 

introduced herself to the group by sharing her favorite vacation spot. She 

participated in group education/discussion on fall prevention. She did a memory 

activity with the group and was successful in recalling items on board. She 

missed part of the group (exercises), refusing to come to group until she had 

eaten her dessert. Pt walked back to her room, [facility] for safety, FWW, all needs 

met.


Start Time:  13:00


Stop Time:  14:00


Total Billed Treatment Time:  60


Total Billed Treatment


visit, 60 minutes group











TEREZA LAMBERT OT Jul 17, 2017 15:12

## 2017-07-17 NOTE — PM & R (SOAP) PROGRESS NOTE
Subjective


Time Seen by Provider:  07:45


Subjective/Events-last exam


Patient was seen in her room this AM Patient SBA for transfers Patient 

considering going to SNU upon discharge from rehab as She feels that she cant 

go home with loose stools and hip pain.Will pass on to SW Patient indicates 

that she cant drink an entire ensure supplement as she becomes full Encouraged 

patient to drink as much that she can tolerate.


Review of Systems


Gastrointestinal:  Other (loose stools)





Objective


Exam


Last Set of Vital Signs





Vital Signs








  Date Time  Temp Pulse Resp B/P (MAP) Pulse Ox O2 Delivery O2 Flow Rate FiO2


 


7/17/17 20:07      Room Air  


 


7/17/17 18:40 98.0 102 16 119/78 95   





Capillary Refill : Less Than 3 SecondsNONE


I&O











Intake and Output 


 


 7/17/17





 00:00


 


Intake Total 1040 ml


 


Balance 1040 ml


 


 


 


Intake Oral 1040 ml


 


# Voids 15


 


# Bowel Movements 7








General:  Alert, Oriented X3, Cooperative, No Acute Distress


HEENT:  Atraumatic, PERRLA, EOMI, Mucous Memb Moist/Pink


Neck:  Supple, No JVD


Lungs:  Clear to Auscultation


Heart:  Regular Rate


Abdomen:  Normal Bowel Sounds, Soft, No Tenderness


Extremities:  Other (edema decreasing with elevation)


Neuro:  Other (guarding rt hip has functional strength distal RT Leg)





Results


Lab





Microbiology


7/9/17 Stool Culture - Final, Complete


         Negative for Salmonella...





Assessment/Plan


Assessment


Displaced rt femoral neck frx  s/p Bipolar Billy arthroplasty rt hip DR Goodrich 

7/4/17


Postop anemia


Chronic diarrhea-associated with Stool +for OB times one-DR Jo following


Hypoalbuminemia


Peripheral edema-associated with Hypoalbuminemia


Postop DVT prophylaxis on Lovenox Sub CUT


Hypokalemia replaced





Plan


Continue PT/OT 


Checked Labs-


DR Jo Manageing Diarrhea-He will consider colonoscopy at a later time 

perhaps as an outpatient


D/C Questran as patients request


-Immodium ordered=adjust dose as needed


Probiotic d/cd as patients request=She feels that it may be contributing to her 

loose stools


See orders.


Elevate legs as much as possible


Ace wraps legs -see orders


 Continue with Nutritional supplement as tolerated


Next Team Conference 7/19/17


F/U with SW tomorrow re possible SNU placement











SHIN DEL VALLE MD Jul 17, 2017 20:33

## 2017-07-17 NOTE — OCCUPATIONAL THER DAILY NOTE
OT Current Status-Daily Note


Subjective


Pt sitting in chair, agrees to treatment. Pt reports 4/10 pain in right hip. Pt 

states she is just worn out from having so much diarrhea.





Mental Status/Objective


              Functional [place] Measure


0=Not Assessed/NA   4=Minimal Assistance


1=Total Assistance   5=Supervision or Setup


2=Maximal Assistance   6=Modified [place]


3=Moderate Assistance   7=Complete [place]





ADL-Treatment


Pt declined shower secondary to fatigue, states she would like a sponge bath 

this morning. Sit to stand from chair with modified independence. Gait to 

restroom with FWW. Pt transferred to [facility] over toilet with SBA. Pt able to 

manage hygiene and clothing with SBA. Stood at sink to wash hands with 

supervision. Pt flossed and brushed with SBA while standing at sink. Sponge 

bath completed while seated in chair. Pt washed upper body with set up. Stood 

with supervision while washing buttocks and destiny area. Pt used long handled 

sponge to wash lower legs and feet. Increased time required for bathing tasks. 

Don pullover shirt with set up. Pt used reacher to start [place] and shorts 

over feet. Stood with supervision for pant hike using FWW for balance. Pt 

requires cues for use of adaptive equipment during LE dressing, but does not 

require physical assistance to complete task. Don bilateral socks using sock aid

; skilled cues for use of sock aid. Pt requires increased time for ADL task and 

takes occasional rest breaks secondary to fatigue. Pt sitting in chair with 

needs met after session.


              Functional [place] Measure


0=Not Assessed/NA   4=Minimal Assistance


1=Total Assistance   5=Supervision or Setup


2=Maximal Assistance   6=Modified [place]


3=Moderate Assistance   7=Complete IndependenceIRFPAI Quality Coding Scale











6 Independent with activity with or without an assistive device


 


5  Patient requires set up or clean up by helper.  Patient completes activity  

by  themselves


 


4 Supervision or touching assist (CGA). [place] provide cues , steadying assist


 


3 The helper provides less than half the effort to complete the activity


 


2 The helper provides more than half the effort to complete the activity


 


1 Dependent.  The helper does all the effort to complete an activity 


 


7 Patient refused to complete or attempt activity


 


9 The patient did not perform the activity before the current illness or injury


 


88 Not attempted due to Medical conditions or safety concerns








Grooming (FIM):  5


Oral Hygiene (QC):  4


Bathing (FIM):  5


Shower/Bathe Self (QC):  4


Upper Body (FIM):  5


Upper Body Dressing (QC):  5


Lower Body Dressing (FIM):  5


Lower Body Dressing (QC):  4


On/Off Footwear (QC):  5


Toileting (FIM):  5


Toilet/Commode Transfer (FIM):  5





OT Short Term Goals


Short Term Goals


Time Frame:  2017


Bathing(FIM):  3


Lower Body Dressing(FIM):  3


Toileting(FIM):  3


Transfers (B,C,W/C) (FIM):  4


Shower Transfer(FIM):  4


Additional Short Term Goals:  1-Demonstrate ADL Tasks, 2-Verbalize Understanding

, 3-ImproveStrength/Ashley


1=Demonstrate adherence to instructed precautions during ADL tasks.


2=Patient will verbalize/demonstrate understanding of assistive devices/

modifications for ADL.


3=Patient will improve strength/tolerance for activity to enable patient to 

perform ADL's.





OT Long Term Goals


Long Term Goals


Time Frame:  2017


Eating (FIM):  6


Eating (QC):  6


Groomin


Oral Hygiene (QC):  6


Bathing(FIM):  5


Shower/Bathe Self (QC):  5


Upper Body Dressing(FIM):  5


Upper Body Dressing (QC):  5


Lower Body Dressing(FIM):  5


Lower Body Dressing (QC):  5


On/Off Footwear (QC):  5


Toileting(FIM):  6


Toileting Hygiene (QC):  6


Toilet/Commode Transfer(FIM):  6


Toilet/Commode Transfer (QC):  6


Shower Transfer(FIM):  5


Additional Goals:  1-Demonstrate ADL Tasks, 2-Verbalize Understanding, 3-

ImproveStrength/Ashley


1=Demonstrate adherence to instructed precautions during ADL tasks.


2=Patient will verbalize/demonstrate understanding of assistive devices/

modifications for ADL.


3=Patient will improve strength/tolerance for activity to enable patient to 

perform ADL's.





OT Education/Plan


Discharge Recommendations


Plan/Recommendations:  Continue POC





Treatment Plan/Plan of Care


Patient would benefit from OT for education, treatment and training to promote 

independence in ADL's, mobility, safety and/or upper extremity function for ADL'

s.


Plan of Care:  ADL Retraining, Functional Mobility, Group Exercise/Act as Ind, 

UE Funct Exercise/Act


Treatment Duration:  2017


Frequency:  Twice Daily


Estimated Hrs Per Day:  1.5 hours per day


Agreement:  Yes


Rehab Potential:  Fair





Time/GCodes


Start Time:  09:00


Stop Time:  10:00


Total Time Billed (hr/min):  60


Billed Treatment Time


1 visit, ADLx4(60minutes)











RADHA HOWARD OT 2017 13:17

## 2017-07-17 NOTE — PHYSICAL THERAPY DAILY NOTE
PT Daily Note-Current


Subjective


Pt. upset and states she is having more swelling and is absolutely exhausted. 

Pt. expresses that she is still angry at the loss of her .  Pt. states 

repeatedly that she could improve if she could only stop the diarrhea and get 

rid of the swelling in her LEs. Nursing explains that the pt. has expressed 

that she only wants Dr. Jo for her colonoscopy and he only sees OPs. Pt. 

also expresses that she feels she should go to [facility] [place] as she cannot take 

care of herself.





Appearance


Continues with significant pitted edema cristiane LEs





Mental Status


Patient Orientation:  Normal For Age





Transfers


              Functional [place] Measure


0=Not Assessed/NA   4=Minimal Assistance


1=Total Assistance   5=Supervision or Setup


2=Maximal Assistance   6=Modified [place]


3=Moderate Assistance   7=Complete IndependenceIRFPAI Quality Coding Scale











6 Independent with activity with or without an assistive device


 


5  Patient requires set up or clean up by helper.  Patient completes activity  

by  themselves


 


4 Supervision or touching assist (CGA). [place] provide cues , steadying assist


 


3 The helper provides less than half the effort to complete the activity


 


2 The helper provides more than half the effort to complete the activity


 


1 Dependent.  The helper does all the effort to complete an activity 


 


7 Patient refused to complete or attempt activity


 


9 The patient did not perform the activity before the current illness or injury


 


88 Not attempted due to Medical conditions or safety concerns








Transfers (B, C, W/C) (FIM):  5


Scootin


Rollin


Supine to/from Sit:  6


Sit to/from Stand:  6


Bed to/from Chair:  6





Gait Training


Does the Patient Walk?:  Yes


Gait (FIM):  5


Distance (FIM):  3=150 ft (2)


Gait Level of Assist:  5


Gait Persons Needed:  1


Gait Assistive Device:  FWW


slow, step to gait pattern, head down, cues for head up etc.





Stair Training


 Stair Training: Handrails/:  2 handrails


Stairs (FIM):  2


#of Steps:  4


Stairs:  Pattern:  Step to


Level of Assist:  4





Exercises


Supine Ex:  Ankle pumps, Quad Set, Rolling, Glut sets, Heel Slides, Short Arc 

Quads, Scooting, Straight leg raise, Hip abd/add


Supine Reps:  10


Seated Therapy Exercises:  Ankle pumps, Sit to stand, Long arc quads


Seated Reps:  10


NuStep Minutes:  10


 NuStep Workload:  2





Treatments


ther ex and nu step to increase strength and functional mobility





Assessment


Current Status:  Good Progress


fatigue, frequent diarrhea and significant diarrhea limit pts. funct mob





PT Short Term Goals


Short Term Goals


Time Frame:  2017


Transfers (B,C,W/C) (FIM):  4


Gait (FIM):  4


Gait Distance Comment:  150'


Gait Level of Assist:  4


Gait Assistive Device:  FWW


Wheelchair Distance:  50'





PT Long Term Goals


Long Term Goals


PT Long Term Goals Time Frame:  2017


Transfers (B,C,W/C) (FIM):  5 (met 17)


Sit to Lying (QC):  4 (met 17)


Lying-Sitting on Side/Bed(QC):  4 (met 17)


Sit to Stand (QC):  4 (met 17)


Rollin


Roll Left to Right (QC):  4 (met 17)


Chair/Bed-to-Chair Xfer(QC):  4


Gait (FIM):  5


Distance:  200'


Walk 10 feet (QC):  4


Walk 10ft-Uneven Surface(QC):  4


Walk 50ft with 2 Turns (QC):  4


Walk 150 ft (QC):  4


Gait Level of Assist:  5


Gait Assistive Device:  FWW


Stairs (FIM):  2 (met 17)


# of Steps:  4 (met 17)


1 Step (curb) (QC):  4 (met 17)


4 Steps (QC):  4 (met 17)


12 Steps (QC):  88


Stairs Level Of Assist:  4 (met 17)





PT Plan


Treatment/Plan


Treatment Plan:  Continue Plan of Care


Treatment Plan:  Bed Mobility, Education, Functional Activity Ashley, Functional 

Strength, Group Therapy, Gait, Safety, Therapeutic Exercise, Transfers


Treatment Duration:  2017


Frequency:  At least 5-7 days/Wk (IRF)


Estimated Hrs Per Day:  1.5 hours per day


Patient and/or Family Agrees t:  Yes





Safety Risks/Education


Patient Education:  Gait Training, Transfer Techniques, Steps, Correct 

Positioning, Disease Process, Safety Issues


Teaching Recipient:  Patient


Teaching Methods:  Demonstration, Discussion


Response to Teaching:  Verbalize Understanding, Return Demonstration, 

Reinforcement Needed





Time/GCodes


Time In:  1100


Time Out:  1210


Total Billed Treatment Time:  70


Total Billed Treatment


1,FA25m,GT20m,EX25m


G Codes Necessary:  DANIELLA Torres PTA 2017 12:17

## 2017-07-18 VITALS — DIASTOLIC BLOOD PRESSURE: 55 MMHG | SYSTOLIC BLOOD PRESSURE: 112 MMHG

## 2017-07-18 VITALS — SYSTOLIC BLOOD PRESSURE: 126 MMHG | DIASTOLIC BLOOD PRESSURE: 69 MMHG

## 2017-07-18 VITALS — DIASTOLIC BLOOD PRESSURE: 65 MMHG | SYSTOLIC BLOOD PRESSURE: 123 MMHG

## 2017-07-18 RX ADMIN — ANORECTAL OINTMENT SCH APPLIC: 15.7; .44; 24; 20.6 OINTMENT TOPICAL at 08:01

## 2017-07-18 RX ADMIN — LOPERAMIDE HYDROCHLORIDE SCH MG: 2 CAPSULE ORAL at 08:00

## 2017-07-18 RX ADMIN — ENOXAPARIN SODIUM SCH MG: 100 INJECTION SUBCUTANEOUS at 08:00

## 2017-07-18 RX ADMIN — ASPIRIN SCH MG: 325 TABLET, COATED ORAL at 08:00

## 2017-07-18 RX ADMIN — ANORECTAL OINTMENT SCH APPLIC: 15.7; .44; 24; 20.6 OINTMENT TOPICAL at 21:10

## 2017-07-18 RX ADMIN — LACTOBACILLUS TAB SCH TAB.CHEW: TAB at 05:25

## 2017-07-18 RX ADMIN — LOPERAMIDE HYDROCHLORIDE SCH MG: 2 CAPSULE ORAL at 20:58

## 2017-07-18 RX ADMIN — IBUPROFEN PRN MG: 200 TABLET, FILM COATED ORAL at 15:25

## 2017-07-18 RX ADMIN — HYDROCORTISONE SCH APPLIC: 25 CREAM TOPICAL at 20:58

## 2017-07-18 RX ADMIN — HYDROCORTISONE SCH APPLIC: 25 CREAM TOPICAL at 08:00

## 2017-07-18 RX ADMIN — LACTOBACILLUS TAB SCH TAB.CHEW: TAB at 11:17

## 2017-07-18 RX ADMIN — LORATADINE SCH MG: 10 TABLET ORAL at 08:00

## 2017-07-18 RX ADMIN — LEVOFLOXACIN SCH MLS/HR: 500 INJECTION, SOLUTION INTRAVENOUS at 12:17

## 2017-07-18 RX ADMIN — Medication SCH OZ: at 08:01

## 2017-07-18 RX ADMIN — POTASSIUM CHLORIDE SCH MEQ: 1500 TABLET, EXTENDED RELEASE ORAL at 05:25

## 2017-07-18 RX ADMIN — LACTOBACILLUS TAB SCH TAB.CHEW: TAB at 15:24

## 2017-07-18 RX ADMIN — METRONIDAZOLE SCH MLS/HR: 5 INJECTION, SOLUTION INTRAVENOUS at 18:20

## 2017-07-18 RX ADMIN — METRONIDAZOLE SCH MLS/HR: 5 INJECTION, SOLUTION INTRAVENOUS at 11:14

## 2017-07-18 NOTE — PODIATRY PROGRESS NOTE
Standard Progress Note


Progress Notes/Assess & Plan


Date Seen by Provider:  Jul 18, 2017


Time Seen by Provider:  16:29


Progress/Assessment & Plan


Podiatry consult dictated.  Foot care given.





Onychomycosis


Peripheral Neuropathy


Hyperkeratotic lesion


Edema


Final Diagnosis


Onychomycosis


Peripheral Neuropathy


Hyperkeratotic lesion


Edema











MAHAD AVALOS DPDANYELLE Jul 18, 2017 16:30

## 2017-07-18 NOTE — PHYSICAL THERAPY DAILY NOTE
PT Daily Note-Current


Subjective


Pt washing her hands in restroom with OT supervising upon arrival.  Pt agrees 

to PT.





Pain





   Numeric Pain Scale:  5-Moderate Pain


   Location:  Right


   Location Body Site:  Foot


   Pain Description:  Tightness


   Comment:  Pt reports pain in RLE (incisionally on R hip as well as R foot).





Mental Status


Patient Orientation:  Person, Place, Situation





Transfers


              Functional [place] Measure


0=Not Assessed/NA   4=Minimal Assistance


1=Total Assistance   5=Supervision or Setup


2=Maximal Assistance   6=Modified [place]


3=Moderate Assistance   7=Complete IndependenceIRFPAI Quality Coding Scale











6 Independent with activity with or without an assistive device


 


5  Patient requires set up or clean up by helper.  Patient completes activity  

by  themselves


 


4 Supervision or touching assist (CGA). [place] provide cues , steadying assist


 


3 The helper provides less than half the effort to complete the activity


 


2 The helper provides more than half the effort to complete the activity


 


1 Dependent.  The helper does all the effort to complete an activity 


 


7 Patient refused to complete or attempt activity


 


9 The patient did not perform the activity before the current illness or injury


 


88 Not attempted due to Medical conditions or safety concerns








Scootin


Sit to/from Stand:  5


Sit to Stand (QC):  5





Weight Bearing


Weight Bearing Restriction:  Full Weight Bearing


Location Restriction:  LE Bilateral





Gait Training


Does the Patient Walk?:  Yes


Distance (FIM):  3=150 ft


Distance:  150'


Walk 10 feet (QC):  5


Walk 50 ft with 2 Turns(QC):  5


Walk 150 ft (QC):  5


Gait Level of Assist:  5


Gait Persons Needed:  1


Gait Assistive Device:  FWW


Pt walks with slow antalgic gait pattern but steady, no LOB.





Wheelchair Training


Does the Pt Use a Wheelchair?:  No





Exercises


Seated Therapy Exercises:  Ankle pumps, Sit to stand (5), Long arc quads


Seated Reps:  20





Treatments


Pt finishes in restroom at start of tx then sits in recliner to blow dry hair.  

Pt insists that she can't leave the room  w/o hair dried because she will get 

sick.  Pt also has nurse put on Flavio hose to assist with LE swelling.  Pt then 

ambulates using FWW at SBA.  Pt completes Seated Ex in chair as well as NuStep 

for 10m at Workload 2.  Pt returns to room to rest in recliner with feet up at 

end of tx with all needs met.





Assessment


Current Status:  Fair Progress


Pt reports swelling & tightness in RLE and Flavio hose makes it tighter.  Nursing 

is notified.





PT Short Term Goals


Short Term Goals


Time Frame:  2017


Transfers (B,C,W/C) (FIM):  4


Gait (FIM):  4


Gait Distance Comment:  150'


Gait Level of Assist:  4


Gait Assistive Device:  FWW


Wheelchair Distance:  50'





PT Long Term Goals


Long Term Goals


PT Long Term Goals Time Frame:  2017


Transfers (B,C,W/C) (FIM):  5 (met 17)


Sit to Lying (QC):  4 (met 17)


Lying-Sitting on Side/Bed(QC):  4 (met 17)


Sit to Stand (QC):  4 (met 17)


Rollin


Roll Left to Right (QC):  4 (met 17)


Chair/Bed-to-Chair Xfer(QC):  4


Gait (FIM):  5


Distance:  200'


Walk 10 feet (QC):  4


Walk 10ft-Uneven Surface(QC):  4


Walk 50ft with 2 Turns (QC):  4


Walk 150 ft (QC):  4


Gait Level of Assist:  5


Gait Assistive Device:  FWW


Stairs (FIM):  2 (met 17)


# of Steps:  4 (met 17)


1 Step (curb) (QC):  4 (met 17)


4 Steps (QC):  4 (met 17)


12 Steps (QC):  88


Stairs Level Of Assist:  4 (met 17)





PT Plan


Problem List


Problem List:  Activity Tolerance, Functional Strength, Safety, Balance, Gait, 

Transfer





Treatment/Plan


Treatment Plan:  Continue Plan of Care


Treatment Plan:  Bed Mobility, Education, Functional Activity Ashley, Functional 

Strength, Group Therapy, Gait, Safety, Therapeutic Exercise, Transfers


Treatment Duration:  2017


Frequency:  At least 5-7 days/Wk (IRF)


Estimated Hrs Per Day:  1.5 hours per day


Patient and/or Family Agrees t:  Yes





Safety Risks/Education


Patient Education:  Gait Training, Transfer Techniques, Correct Positioning, 

Safety Issues


Teaching Recipient:  Patient


Teaching Methods:  Discussion


Response to Teaching:  Verbalize Understanding





Time/GCodes


Time In:  945


Time Out:  1045


Total Billed Treatment Time:  60


Total Billed Treatment


visit, FA x2 (30m), GT (10m) & EX (20m)











DENNIS ALAN PTA 2017 12:08

## 2017-07-18 NOTE — OCCUPATIONAL THER DAILY NOTE
OT Current Status-Daily Note


Subjective


Pt sitting in chair, agrees to treatment. Pt states she doesn't like change and 

is anxious about the changes that have taken place.





Mental Status/Objective


              Functional [place] Measure


0=Not Assessed/NA   4=Minimal Assistance


1=Total Assistance   5=Supervision or Setup


2=Maximal Assistance   6=Modified [place]


3=Moderate Assistance   7=Complete [place]





ADL-Treatment


Pt performed gait to restroom with FWW, slow pace but no LOB noted. Transfer to 

walk in shower with SBA using grab bars; skilled cues for safety. Pt completed 

seated bathing using hand held shower and long handled sponge. Upper body 

bathing completed with set up. Pt stood with supervision to wash buttocks and 

destiny area. Used long handled sponge to wash lower legs and feet. Increased time 

required for bathing. Don pullover shirt with set up. Pt used reacher to start 

Depends and shorts over feet. Stood with supervision for pant hike. Pt donned 

left sock with verbal cues using sock aid. Minimal assistance required to don 

right sock. Pt frustrated with having to use adaptive equipment for LE ADLs. 

Reviewed reasons for using adaptive equipment. Pt states understanding, but 

states she doesn't feel like she will be able to do it at home by herself. 

Grooming tasks completed standing at sink. Pt flossed and brushed teeth and 

combed hair with SBA. Pt transferred to [facility] over toilet with SBA. Pt on toilet 

with PT present after session.


              Functional [place] Measure


0=Not Assessed/NA   4=Minimal Assistance


1=Total Assistance   5=Supervision or Setup


2=Maximal Assistance   6=Modified [place]


3=Moderate Assistance   7=Complete IndependenceIRFPAI Quality Coding Scale











6 Independent with activity with or without an assistive device


 


5  Patient requires set up or clean up by helper.  Patient completes activity  

by  themselves


 


4 Supervision or touching assist (CGA). [place] provide cues , steadying assist


 


3 The helper provides less than half the effort to complete the activity


 


2 The helper provides more than half the effort to complete the activity


 


1 Dependent.  The helper does all the effort to complete an activity 


 


7 Patient refused to complete or attempt activity


 


9 The patient did not perform the activity before the current illness or injury


 


88 Not attempted due to Medical conditions or safety concerns








Grooming (FIM):  5


Oral Hygiene (QC):  4


Bathing (FIM):  5


Shower/Bathe Self (QC):  4


Upper Body (FIM):  5


Upper Body Dressing (QC):  5


Lower Body Dressing (FIM):  4


Lower Body Dressing (QC):  4


On/Off Footwear (QC):  3


Toilet/Commode Transfer (FIM):  5


Toilet Transfer (QC):  4


Shower Transfer(FIM):  5





OT Short Term Goals


Short Term Goals


Time Frame:  2017


Bathing(FIM):  3


Lower Body Dressing(FIM):  3


Toileting(FIM):  3


Transfers (B,C,W/C) (FIM):  4


Shower Transfer(FIM):  4


Additional Short Term Goals:  1-Demonstrate ADL Tasks, 2-Verbalize Understanding

, 3-ImproveStrength/Ashley


1=Demonstrate adherence to instructed precautions during ADL tasks.


2=Patient will verbalize/demonstrate understanding of assistive devices/

modifications for ADL.


3=Patient will improve strength/tolerance for activity to enable patient to 

perform ADL's.





OT Long Term Goals


Long Term Goals


Time Frame:  2017


Eating (FIM):  6


Eating (QC):  6


Groomin


Oral Hygiene (QC):  6


Bathing(FIM):  5


Shower/Bathe Self (QC):  5


Upper Body Dressing(FIM):  5


Upper Body Dressing (QC):  5


Lower Body Dressing(FIM):  5


Lower Body Dressing (QC):  5


On/Off Footwear (QC):  5


Toileting(FIM):  6


Toileting Hygiene (QC):  6


Toilet/Commode Transfer(FIM):  6


Toilet/Commode Transfer (QC):  6


Shower Transfer(FIM):  5


Additional Goals:  1-Demonstrate ADL Tasks, 2-Verbalize Understanding, 3-

ImproveStrength/Ashley


1=Demonstrate adherence to instructed precautions during ADL tasks.


2=Patient will verbalize/demonstrate understanding of assistive devices/

modifications for ADL.


3=Patient will improve strength/tolerance for activity to enable patient to 

perform ADL's.





OT Education/Plan


Discharge Recommendations


Plan/Recommendations:  Continue POC





Treatment Plan/Plan of Care


Patient would benefit from OT for education, treatment and training to promote 

independence in ADL's, mobility, safety and/or upper extremity function for ADL'

s.


Plan of Care:  ADL Retraining, Functional Mobility, Group Exercise/Act as Ind, 

UE Funct Exercise/Act


Treatment Duration:  2017


Frequency:  Twice Daily


Estimated Hrs Per Day:  1.5 hours per day


Agreement:  Yes


Rehab Potential:  Fair





Time/GCodes


Start Time:  08:45


Stop Time:  09:45


Total Time Billed (hr/min):  60


Billed Treatment Time


1 visit, ADLx4(60minutes)











RADHA HOWARD OT 2017 12:12

## 2017-07-18 NOTE — PM & R (SOAP) PROGRESS NOTE
Subjective


Time Seen by Provider:  07:50


Subjective/Events-last exam


Patient was seen in her room this AM Concerned re ongoing Loose stools Now with 

lowgrade fever last night Discussed case with DR Jo Antibiotics begun 

empirically May have endoscopy Friday the 21st.Patient SBA for transfers


Review of Systems


General:  Fatigue


Gastrointestinal:  Diarrhea


Musculoskeletal:  leg pain





Objective


Exam


Last Set of Vital Signs





Vital Signs








  Date Time  Temp Pulse Resp B/P (MAP) Pulse Ox O2 Delivery O2 Flow Rate FiO2


 


7/18/17 08:57      Room Air  


 


7/18/17 05:50 97.9 87 17 126/69 95   





Capillary Refill : Less Than 3 SecondsNONE


I&O











Intake and Output 


 


 7/18/17





 00:00


 


Intake Total 900 ml


 


Balance 900 ml


 


 


 


Intake Oral 900 ml


 


# Voids 12


 


# Bowel Movements 5








General:  Alert, Oriented X3, Cooperative, No Acute Distress


HEENT:  Atraumatic, PERRLA, EOMI, Mucous Memb Moist/Pink


Neck:  Supple, No JVD


Lungs:  Clear to Auscultation


Heart:  Regular Rate


Abdomen:  Normal Bowel Sounds, Soft, No Tenderness


Extremities:  Other (edema decreasing with elevation)


Neuro:  Other (guarding rt hip has functional strength distal RT Leg)





Results


Lab





Microbiology


7/9/17 Stool Culture - Final, Complete


         Negative for Salmonella...





Assessment/Plan


Assessment


Displaced rt femoral neck frx  s/p Bipolar Billy arthroplasty rt hip DR Goodrich 

7/4/17


Postop anemia


Chronic diarrhea-associated with Stool +for OB times one-DR Jo following 

and now with


FUO started on antibiotics empirically


Hypoalbuminemia


Peripheral edema-associated with Hypoalbuminemia-nutritional supplement ordered 

and legs elevated as much as possible


Postop DVT prophylaxis on Lovenox Sub CUT


Hypokalemia replaced





Plan


Continue PT/OT 


Checked Labs-


DR Jo Manageing Diarrhea-He will consider colonoscopy at a later time 

perhaps as an outpatient


D/C Questran as patients request


-Immodium ordered=adjust dose as needed


Probiotic d/cd as patients request=She feels that it may be contributing to her 

loose stools


See orders.


Elevate legs as much as possible


Ace wraps legs -see orders


 Continue with Nutritional supplement as tolerated


Next Team Conference tomorrow 7/19/17


F/U with SW tomorrow re possible SNU placement


Antibiitc for Low grade fever on an empirical basis











SHIN DEL VALLE MD Jul 18, 2017 11:57

## 2017-07-18 NOTE — PROGRESS NOTE-HOSPITALIST
Subjective


HPI/CC On Admission


Date Seen by Provider:  Jul 18, 2017


Time Seen by Provider:  08:30


Mrs. Campbell and used to have diarrhea with poor appetite.  She's had some low-

grade temperature.  She denies abdominal pain or distention.  She continues to 

have lower extremity swelling unchanged and has not seen any blood in her 

stool.  With L[place]otil diarrhea has diminished a little but still averaging 7-8 

loose to semi-formed stools.  She has been anxious about the thought of 

returning home.  Currently she would not be able to care for herself due to 

diarrhea and fear of accidents.  This in addition to continued fatigue with 

poor appetite.  She is sided appropriately to look into skilled nursing care 

when discharged from acute rehabilitation.





Objective


Exam


Vital Signs





Vital Sign - Last 12Hours








 7/12/17





 06:25


 


Temp 98.1


 


Pulse 85


 


Resp 20


 


B/P (MAP) 114/56


 


Pulse Ox 96


 


O2 Delivery Room Air





Capillary Refill : Less Than 3 SecondsNONE


General Appearance:  Anxious, Chronically ill


Respiratory:  Chest Non Tender, Lungs Clear, Normal Breath Sounds, No Accessory 

Muscle Use, No Respiratory Distress


Cardiovascular:  Regular Rate, Rhythm, No Edema, No Gallop, No JVD, No Murmur, 

Normal Peripheral Pulses


Gastrointestinal:  Normal Bowel Sounds, No Organomegaly, No Pulsatile Mass, Soft

, Other (Minimal tenderness in the right lower quadrant without rebound or 

guarding.)


Extremity:  Other (3+ edema in the right lower extremity to the level of the 

knee 2+ on the left extremities are warm no upper extremity edema noted.)





Assessment/Plan


Assessment and Plan


Assess & Plan/Chief Complaint


1.  With abrupt onset of diarrhea and current low-grade fever despite negative 

cultures and infectious etiology is a strong possibility.  Overflow is in the 

differential although the patient is not having much in the way of abdominal 

distention and her last x-ray did not reveal significant stool retention done 

last week.  She is going to have significant difficulty in her current 

condition keeping a prep down.  Will initiate empiric Levaquin and Flagyl and 

reevaluate on Thursday for possible colonoscopy on Friday.


2.  Situational anxiety aggravating number 1.  Number 3 malnutrition secondary 

to number 1 contributing to pedal edema continue elevation.


3.  We'll discontinue aspirin and a performing colonoscopy on Friday we'll need 

to hold Lovenox after Thursday's dose.  In addressing the patient's concerns 

lengthy discussion about colonoscopy and her current concerns about coverage 

and addressing anxiety issues one hour of care time has been spent including 

discussion with nursing staff and physical therapy.  Patient has concerns about 

medication and does not wish any for anxiety at this time.











MANNY SALGADO MD Jul 18, 2017 09:24

## 2017-07-18 NOTE — THERAPY GROUP DAILY NOTE
Therapy Daily Group Note


Exercises


Balance, Stretching





Other/Notes


Pt arrived to PT Group in [facility] area by ambulating using FWW at [facility].  

Group consisted of Introductions (Name, where you are from and the Funniest 

thing you have ever seen) as well Activities focused on Memory, Problem Solving

, Sequencing as well as body/trunk control and dynamic sitting balance.  Pt 

actively participated in Group by verbally giving answers during Introductions 

and group activity as well as actively moving during group activity.  Pt did 

ask several times "if Group was almost done yet".  Pt returned to room at end 

of Group to rest with all needs met.  Pt wanted nurse to check IV since she 

thought it was pulled out when putting on her sweater.  Nurse to arrive shortly.


Start Time:  13:00


Stop Time:  14:00


Total Billed Treatment Time:  60


Total Billed Treatment


1, GRP











DENNIS ALAN PTA Jul 18, 2017 14:34

## 2017-07-19 VITALS — SYSTOLIC BLOOD PRESSURE: 128 MMHG | DIASTOLIC BLOOD PRESSURE: 70 MMHG

## 2017-07-19 VITALS — DIASTOLIC BLOOD PRESSURE: 68 MMHG | SYSTOLIC BLOOD PRESSURE: 127 MMHG

## 2017-07-19 RX ADMIN — METRONIDAZOLE SCH MLS/HR: 5 INJECTION, SOLUTION INTRAVENOUS at 16:51

## 2017-07-19 RX ADMIN — ONDANSETRON PRN MG: 4 TABLET, ORALLY DISINTEGRATING ORAL at 06:03

## 2017-07-19 RX ADMIN — ENOXAPARIN SODIUM SCH MG: 100 INJECTION SUBCUTANEOUS at 08:36

## 2017-07-19 RX ADMIN — METRONIDAZOLE SCH MLS/HR: 5 INJECTION, SOLUTION INTRAVENOUS at 02:23

## 2017-07-19 RX ADMIN — LORAZEPAM PRN MG: 0.5 TABLET ORAL at 03:27

## 2017-07-19 RX ADMIN — Medication SCH OZ: at 08:36

## 2017-07-19 RX ADMIN — LORAZEPAM PRN MG: 0.5 TABLET ORAL at 21:11

## 2017-07-19 RX ADMIN — LACTOBACILLUS TAB SCH TAB.CHEW: TAB at 05:42

## 2017-07-19 RX ADMIN — ANORECTAL OINTMENT SCH APPLIC: 15.7; .44; 24; 20.6 OINTMENT TOPICAL at 08:36

## 2017-07-19 RX ADMIN — HYDROCORTISONE SCH APPLIC: 25 CREAM TOPICAL at 20:20

## 2017-07-19 RX ADMIN — HYDROCODONE BITARTRATE AND ACETAMINOPHEN PRN TAB: 5; 325 TABLET ORAL at 22:44

## 2017-07-19 RX ADMIN — IBUPROFEN PRN MG: 200 TABLET, FILM COATED ORAL at 08:36

## 2017-07-19 RX ADMIN — LORATADINE SCH MG: 10 TABLET ORAL at 08:36

## 2017-07-19 RX ADMIN — METRONIDAZOLE SCH MLS/HR: 5 INJECTION, SOLUTION INTRAVENOUS at 09:18

## 2017-07-19 RX ADMIN — HYDROCORTISONE SCH APPLIC: 25 CREAM TOPICAL at 08:35

## 2017-07-19 RX ADMIN — LACTOBACILLUS TAB SCH TAB.CHEW: TAB at 11:51

## 2017-07-19 RX ADMIN — POTASSIUM CHLORIDE SCH MEQ: 1500 TABLET, EXTENDED RELEASE ORAL at 05:42

## 2017-07-19 RX ADMIN — LOPERAMIDE HYDROCHLORIDE SCH MG: 2 CAPSULE ORAL at 20:20

## 2017-07-19 RX ADMIN — LACTOBACILLUS TAB SCH TAB.CHEW: TAB at 16:50

## 2017-07-19 RX ADMIN — ANORECTAL OINTMENT SCH APPLIC: 15.7; .44; 24; 20.6 OINTMENT TOPICAL at 20:20

## 2017-07-19 RX ADMIN — LOPERAMIDE HYDROCHLORIDE SCH MG: 2 CAPSULE ORAL at 08:36

## 2017-07-19 NOTE — PHYSICAL THERAPY DAILY NOTE
PT Daily Note-Current


Subjective


Pt. states she is miserable and has had N&V all night long, cant drink or eat 

now and not sure she can participate in her Rx.  C/o neck pain R>L in upper 

trap area. States she did not sleep last night.





Pain





   Numeric Pain Scale:  6


   Location:  Right, Left


   Location Body Site:  Neck


   Pain Description:  Ache





Appearance


pitting edema bilateral LEs , very tight traps bilat.





Mental Status


Patient Orientation:  Normal For Age





Transfers


              Functional [place] Measure


0=Not Assessed/NA   4=Minimal Assistance


1=Total Assistance   5=Supervision or Setup


2=Maximal Assistance   6=Modified [place]


3=Moderate Assistance   7=Complete IndependenceIRFPAI Quality Coding Scale











6 Independent with activity with or without an assistive device


 


5  Patient requires set up or clean up by helper.  Patient completes activity  

by  themselves


 


4 Supervision or touching assist (CGA). [place] provide cues , steadying assist


 


3 The helper provides less than half the effort to complete the activity


 


2 The helper provides more than half the effort to complete the activity


 


1 Dependent.  The helper does all the effort to complete an activity 


 


7 Patient refused to complete or attempt activity


 


9 The patient did not perform the activity before the current illness or injury


 


88 Not attempted due to Medical conditions or safety concerns








Transfers (B, C, W/C) (FIM):  5


Scootin


Rollin


Supine to/from Sit:  5


Sit to/from Stand:  5


Bed to/from Chair:  5





Weight Bearing


Weight Bearing Restriction:  Weight Bearing/Tolerated





Gait Training


Does the Patient Walk?:  Yes


Gait (FIM):  5


Distance (FIM):  3=150 ft (x2)


Gait Level of Assist:  5


Gait Persons Needed:  1


Gait Assistive Device:  FWW


slow, looks down, c/o she is so very fatigued





Exercises


Seated Therapy Exercises:  Ankle pumps, Sit to stand, Long arc quads, Hip 

flexion, Hip abd/add


Seated Reps:  15





Treatments


warm moist pack and massage to cristiane upper traps, pt. later states it made her 

feel worse instead of better and now has a headache





Assessment


Current Status:  Fair Progress


pt. states she feels worse and not sure she can continue therapies





PT Short Term Goals


Short Term Goals


Time Frame:  2017


Transfers (B,C,W/C) (FIM):  4


Gait (FIM):  4


Gait Distance Comment:  150'


Gait Level of Assist:  4


Gait Assistive Device:  FWW


Wheelchair Distance:  50'





PT Long Term Goals


Long Term Goals


PT Long Term Goals Time Frame:  2017


Transfers (B,C,W/C) (FIM):  5 (met 17)


Sit to Lying (QC):  4 (met 17)


Lying-Sitting on Side/Bed(QC):  4 (met 17)


Sit to Stand (QC):  4 (met 17)


Rollin


Roll Left to Right (QC):  4 (met 17)


Chair/Bed-to-Chair Xfer(QC):  4


Gait (FIM):  5


Distance:  200'


Walk 10 feet (QC):  4


Walk 10ft-Uneven Surface(QC):  4


Walk 50ft with 2 Turns (QC):  4


Walk 150 ft (QC):  4


Gait Level of Assist:  5


Gait Assistive Device:  FWW


Stairs (FIM):  2 (met 17)


# of Steps:  4 (met 17)


1 Step (curb) (QC):  4 (met 17)


4 Steps (QC):  4 (met 17)


12 Steps (QC):  88


Stairs Level Of Assist:  4 (met 17)





PT Plan


Treatment/Plan


Treatment Plan:  Continue Plan of Care


Treatment Plan:  Bed Mobility, Education, Functional Activity Ashley, Functional 

Strength, Group Therapy, Gait, Safety, Therapeutic Exercise, Transfers


Treatment Duration:  2017


Frequency:  At least 5-7 days/Wk (IRF)


Estimated Hrs Per Day:  1.5 hours per day


Patient and/or Family Agrees t:  Yes





Safety Risks/Education


Patient Education:  Gait Training, Transfer Techniques, Correct Positioning, 

Disease Process, Safety Issues


Teaching Recipient:  Patient


Teaching Methods:  Demonstration, Discussion


Response to Teaching:  Verbalize Understanding, Return Demonstration, 

Reinforcement Needed





Time/GCodes


Time In:  800


Time Out:  900


Total Billed Treatment Time:  60


Total Billed Treatment


1,GT25m,FA20m,EX15m


G Codes Necessary:  DANIELLA Torres PTA 2017 09:04

## 2017-07-19 NOTE — OCCUPATIONAL THER DAILY NOTE
OT Current Status-Daily Note


Subjective


Pt alert, sitting in recliner.  Pt stated that she had a couple of good days 

then she was drained in afternoon and it continued throughout the night with N&

V.  She wasn't sure how much she could do for therapy today.  BAXTER encouraged 

pt to get cleaned up for the day.





Mental Status/Objective


Patient Orientation:  Person, Place, Time, Situation


              Functional [place] Measure


0=Not Assessed/NA   4=Minimal Assistance


1=Total Assistance   5=Supervision or Setup


2=Maximal Assistance   6=Modified [place]


3=Moderate Assistance   7=Complete [place]


Attachments:  IV





ADL-Treatment


Pt ambulated to bathroom with SBA using FWW.  Pt then completed toilet transfer 

and toileting using FWW, elevated seat and grabbars with SBA.  Pt ambulated to 

sink with FWW and sat in chair to complete grooming by self.  Pt ambulated to 

recliner and was able to don night gown by self after set up.  Pt doffed socks 

by self with dressing stick and assist to don sock due to increased swelling in 

feet.  Edema appeared to have increased in R foot.  Skin taught and shiny with 

redness on dorsum of foot.  Pain from outside heel up to calf with redness on 

outside of calf.  No heat from red areas.  Nrsg notified.  Light retrograde 

massage to B lower legs.  Nrsg brought ACE wraps and BAXTER wrapped foot up to 

ankle, will check on foot in 1/2 hour after wrapping.  Pt then requested to go 

to bathroom.  Ambulated to bathroom and completed transfer to toilet and 

toileting with SBA.  After therapy, pt sitting on toilet and was instructed to 

use call light for assistance.


              Functional [place] Measure


0=Not Assessed/NA   4=Minimal Assistance


1=Total Assistance   5=Supervision or Setup


2=Maximal Assistance   6=Modified [place]


3=Moderate Assistance   7=Complete IndependenceIRFPAI Quality Coding Scale











6 Independent with activity with or without an assistive device


 


5  Patient requires set up or clean up by helper.  Patient completes activity  

by  themselves


 


4 Supervision or touching assist (CGA). [place] provide cues , steadying assist


 


3 The helper provides less than half the effort to complete the activity


 


2 The helper provides more than half the effort to complete the activity


 


1 Dependent.  The helper does all the effort to complete an activity 


 


7 Patient refused to complete or attempt activity


 


9 The patient did not perform the activity before the current illness or injury


 


88 Not attempted due to Medical conditions or safety concerns








Grooming (FIM):  6


Upper Body (FIM):  5


Toileting (FIM):  5


Transfers (B, C, W/C) (FIM):  5


Toilet/Commode Transfer (FIM):  5





OT Short Term Goals


Short Term Goals


Time Frame:  2017


Bathing(FIM):  3


Lower Body Dressing(FIM):  3


Toileting(FIM):  3


Transfers (B,C,W/C) (FIM):  4


Shower Transfer(FIM):  4


Additional Short Term Goals:  1-Demonstrate ADL Tasks, 2-Verbalize Understanding

, 3-ImproveStrength/Ashley


1=Demonstrate adherence to instructed precautions during ADL tasks.


2=Patient will verbalize/demonstrate understanding of assistive devices/

modifications for ADL.


3=Patient will improve strength/tolerance for activity to enable patient to 

perform ADL's.





OT Long Term Goals


Long Term Goals


Time Frame:  2017


Eating (FIM):  6


Eating (QC):  6


Groomin


Oral Hygiene (QC):  6


Bathing(FIM):  5


Shower/Bathe Self (QC):  5


Upper Body Dressing(FIM):  5


Upper Body Dressing (QC):  5


Lower Body Dressing(FIM):  5


Lower Body Dressing (QC):  5


On/Off Footwear (QC):  5


Toileting(FIM):  6


Toileting Hygiene (QC):  6


Toilet/Commode Transfer(FIM):  6


Toilet/Commode Transfer (QC):  6


Shower Transfer(FIM):  5


Additional Goals:  1-Demonstrate ADL Tasks, 2-Verbalize Understanding, 3-

ImproveStrength/Ashley


1=Demonstrate adherence to instructed precautions during ADL tasks.


2=Patient will verbalize/demonstrate understanding of assistive devices/

modifications for ADL.


3=Patient will improve strength/tolerance for activity to enable patient to 

perform ADL's.





OT Education/Plan


Discharge Recommendations


Plan/Recommendations:  Continue POC





Treatment Plan/Plan of Care


Patient would benefit from OT for education, treatment and training to promote 

independence in ADL's, mobility, safety and/or upper extremity function for ADL'

s.


Plan of Care:  ADL Retraining, Functional Mobility, Group Exercise/Act as Ind, 

UE Funct Exercise/Act


Treatment Duration:  2017


Frequency:  Twice Daily


Estimated Hrs Per Day:  1.5 hours per day


Agreement:  Yes


Rehab Potential:  Fair





Time/GCodes


Start Time:  10:00


Stop Time:  11:00


Total Time Billed (hr/min):  60


Billed Treatment Time


1 visit-FA 4 (60 min)











KHADAR GARCIA 2017 11:43

## 2017-07-19 NOTE — CONSULTATION REPORT
DATE OF CONSULTATION:  

07/18/2017

 

REASON FOR CONSULT:

Foot care. 

 

HISTORY OF PRESENT ILLNESS:

This 82-year-old female was admitted to the hospital secondary to

a hip fracture right lower extremity. She has difficulty even

before the injury to reach and care for her feet. She is now

complaining of toenails which she cannot care for herself. She is

also complaining of chronic edema to the legs and including the

feet. She has been wearing some compression hose recently, which

seemed to help a little bit. She also has a history of posterior

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

wearing a brace. She had surgery on July 4th for the displaced

femoral neck fracture right hip. She indicated prior to admission

that she was taking no medication. She denies tobacco, alcohol or

illicit drug use current.

 

MEDICATIONS: 

Medications are listed on the patient's chart. 

 

ALLERGIES:

1.   She is allergic to CLINDAMYCIN.

2.   SULFA.

3.   NYSTATIN.

4.   AMOXICILLIN.

5.   CHLOHEXIDINE.  

 

FAMILY HISTORY:

Noncontributory. 

 

PHYSICAL EXAM:

This this is a well-developed female, in no apparent distress.

She has 0/4 dorsalis pedis pulse on the right 2/4 dorsalis pedis

pulse on the left, 0/4 posterior tibial pulse bilaterally,

Pitting edema noted to the bilateral foot and ankle. 

NEUROLOGICALLY:  The patient has and diminished protective

sensation per 10 grams monofilament wire examination bilaterally.

Diminished deep tendon reflexes to the Achilles tendon bilateral

and diminished vibratory sensation bilateral forefoot.

DERMATOLOGICALLY:  She has thick, yellow dystrophic toenails with

subungual debris, associated with R1, 3, 4, 5, L2, 3, 4 and 5,

there is no nail plate to the L1 digit. There is a hyperkeratotic

lesion, lateral aspect of the left hallux toe. 

MUSCULOSKELETAL FINDINGS:  She has contracted digits that are

flexible right 3, 4 and 5, L4 and 5. She has a semirigid

contracture of the left second digit. 4/5 muscle strength to the

4 major quadrants of the foot noted bilateral lower extremity. 

 

ASSESSMENT:

1.   Idiopathic neuropathy. 

2.   Onychomycosis. 

3.   Hammer digit syndrome.

4.   Hyperkeratosis. 

 

PLAN:

The toenails were debrided manually and mechanically. Betadine

applied. I recommend the patient utilize a toe spacer between the

first and second digits, left foot. She is to continue  with

compression therapy.  We will see the patient back in our clinic

as needed 

 

 

 

 

 

Job ID: [id number] 

Dictated Date: 07/18/2017 16:28:57 

Transcription Date: 07/19/2017 08:39:11/[email or web address]

## 2017-07-19 NOTE — PM & R (SOAP) PROGRESS NOTE
Subjective


Time Seen by Provider:  07:55


Subjective/Events-last exam


Patient was seen in her room this AM lying in bed Patient SBA for transfers 

Patient indicates that diarrhea improved but had nocturia affecting her sleep. 

Appreciate DR Sebastian note (DPM)


Review of Systems


Cardiovascular:  Edema





Objective


Exam


Last Set of Vital Signs





Vital Signs








  Date Time  Temp Pulse Resp B/P (MAP) Pulse Ox O2 Delivery O2 Flow Rate FiO2


 


7/19/17 05:04 98.7 91 18 128/70 96 Room Air  





Capillary Refill : Less Than 3 SecondsNONE


I&O











Intake and Output 


 


 7/19/17





 00:00


 


Intake Total 2492 ml


 


Balance 2492 ml


 


 


 


Intake Oral 2342 ml


 


IV Total 150 ml


 


# Voids 11


 


# Bowel Movements 1








General:  Alert, Oriented X3, Cooperative, No Acute Distress


HEENT:  Atraumatic, PERRLA, EOMI, Mucous Memb Moist/Pink


Neck:  Supple, No JVD


Lungs:  Clear to Auscultation


Heart:  Regular Rate


Abdomen:  Normal Bowel Sounds, Soft, No Tenderness


Extremities:  Other (edema 1+ BLES)


Neuro:  Other (guarding rt hip has functional strength distal RT Leg)





Results


Lab





Microbiology


7/9/17 Stool Culture - Final, Complete


         Negative for Salmonella...





Assessment/Plan


Assessment


Displaced rt femoral neck frx  s/p Bipolar Billy arthroplasty rt hip DR Goodrich 

7/4/17


Postop anemia


Chronic diarrhea-associated with Stool +for OB times one-DR Jo following 

and now with


FUO started on antibiotics empirically now afebrile


Hypoalbuminemia


Peripheral edema-associated with Hypoalbuminemia-nutritional supplement ordered 

and legs elevated as much as possible


Postop DVT prophylaxis on Lovenox Sub CUT


Hypokalemia replaced


Idiopathhic neuropathy


Onychomycosis


Hammer digit syndrome


Hyperkeratosis





Plan


Continue PT/OT -


DR Jo Manageing Diarrhea- and antibiotics-He will consider colonoscopy at 

a later time perhaps as an outpatient


D/C Questran as patients request


-Immodium ordered=adjust dose as needed


Probiotic d/cd as patients request=She feels that it may be contributing to her 

loose stools


.Elevate legs as much as possible


 Continue with Nutritional supplement as tolerated


Next Team Conference later today- 7/19/17


F/U with SW tomorrow re possible SNU placement upon completion of IV antibiotics


Antibiotic for Low grade fever on an empirical basis


Recheck Labs


See orders











SHIN DEL VALLE MD Jul 19, 2017 09:45

## 2017-07-19 NOTE — OCCUPATIONAL THER DAILY NOTE
OT Current Status-Daily Note


Subjective


Pt alert, lying in bed.  Finished up with PT.  Pt agreed to OT.  C/o fatigue.





Mental Status/Objective


Patient Orientation:  Person, Place, Time, Situation


              Functional [place] Measure


0=Not Assessed/NA   4=Minimal Assistance


1=Total Assistance   5=Supervision or Setup


2=Maximal Assistance   6=Modified [place]


3=Moderate Assistance   7=Complete [place]


Attachments:  IV





ADL-Treatment


OT unwrapped pt's R foot and skin continues to be shiny though was able to see 

wrinkles in skin.  Pt c/o pain in arch of R foot.  Tightness in arch noted and 

light massage completed to decrease pain and tightness.  Pt requested to use 

bathroom.  Pt required assist to move from lying on L side to back then pt was 

able to go from supine to sitting EOB by self.  Pt ambulated to bathroom and 

completed toilet transfer with supervision.  After therapy, pt was instructed 

to use call light to call for assistance when finished with toilet.


              Functional [place] Measure


0=Not Assessed/NA   4=Minimal Assistance


1=Total Assistance   5=Supervision or Setup


2=Maximal Assistance   6=Modified [place]


3=Moderate Assistance   7=Complete IndependenceIRFPAI Quality Coding Scale











6 Independent with activity with or without an assistive device


 


5  Patient requires set up or clean up by helper.  Patient completes activity  

by  themselves


 


4 Supervision or touching assist (CGA). [place] provide cues , steadying assist


 


3 The helper provides less than half the effort to complete the activity


 


2 The helper provides more than half the effort to complete the activity


 


1 Dependent.  The helper does all the effort to complete an activity 


 


7 Patient refused to complete or attempt activity


 


9 The patient did not perform the activity before the current illness or injury


 


88 Not attempted due to Medical conditions or safety concerns











OT Short Term Goals


Short Term Goals


Time Frame:  2017


Bathing(FIM):  3


Lower Body Dressing(FIM):  3


Toileting(FIM):  3


Transfers (B,C,W/C) (FIM):  4


Shower Transfer(FIM):  4


Additional Short Term Goals:  1-Demonstrate ADL Tasks, 2-Verbalize Understanding

, 3-ImproveStrength/Ashley


1=Demonstrate adherence to instructed precautions during ADL tasks.


2=Patient will verbalize/demonstrate understanding of assistive devices/

modifications for ADL.


3=Patient will improve strength/tolerance for activity to enable patient to 

perform ADL's.





OT Long Term Goals


Long Term Goals


Time Frame:  2017


Eating (FIM):  6


Eating (QC):  6


Groomin


Oral Hygiene (QC):  6


Bathing(FIM):  5


Shower/Bathe Self (QC):  5


Upper Body Dressing(FIM):  5


Upper Body Dressing (QC):  5


Lower Body Dressing(FIM):  5


Lower Body Dressing (QC):  5


On/Off Footwear (QC):  5


Toileting(FIM):  6


Toileting Hygiene (QC):  6


Toilet/Commode Transfer(FIM):  6


Toilet/Commode Transfer (QC):  6


Shower Transfer(FIM):  5


Additional Goals:  1-Demonstrate ADL Tasks, 2-Verbalize Understanding, 3-

ImproveStrength/Ashley


1=Demonstrate adherence to instructed precautions during ADL tasks.


2=Patient will verbalize/demonstrate understanding of assistive devices/

modifications for ADL.


3=Patient will improve strength/tolerance for activity to enable patient to 

perform ADL's.





OT Education/Plan


Discharge Recommendations


Plan/Recommendations:  Continue POC





Treatment Plan/Plan of Care


Patient would benefit from OT for education, treatment and training to promote 

independence in ADL's, mobility, safety and/or upper extremity function for ADL'

s.


Plan of Care:  ADL Retraining, Functional Mobility, Group Exercise/Act as Ind, 

UE Funct Exercise/Act


Treatment Duration:  2017


Frequency:  Twice Daily


Estimated Hrs Per Day:  1.5 hours per day


Agreement:  Yes


Rehab Potential:  Fair





Time/GCodes


Start Time:  11:45


Stop Time:  12:15


Total Time Billed (hr/min):  30


Billed Treatment Time


1 visit-FA 2 (30 min)











KHADAR GARCIA 2017 12:34

## 2017-07-19 NOTE — CONSULTATION
History of Present Illness


History of Present Illness


Patient Consulted On(rubin/time)


17


 18:23


Date Seen by Provider:  2017


Time Seen by Provider:  18:24


Reason for Visit:  consult


History of Present Illness


pt is admited for rehab post right hip fracture and repair


pt stated daughter though she was not hearing well - pt has not noticed any 

acute changes 


pt stated had episode of dizziness and off balance - no spinning quality - pt 

relates it to neck pain from previous injury





Allergies and Home Medications


Allergies


Coded Allergies:  


     nystatin (Verified  Allergy, Mild, HIVES, 17)


     amoxicillin (Unverified  Allergy, Unknown, 10/16/14)


     chlorhexidine (Verified  Allergy, Unknown, HIVES, 17)


     clindamycin (Unverified  Allergy, Unknown, 10/16/14)


     sulfamethoxazole (Unverified  Allergy, Unknown, 10/16/14)


     trimethoprim (Unverified  Allergy, Unknown, 10/16/14)





Home Medications


Aspirin 325 Mg Tablet.dr, 325 MG PO DAILY PRN for PAIN-MILD, (Reported)


Loratadine 10 Mg Tablet, 10 MG PO DAILY PRN for ALLERGIES, (Reported)





Past Medical-Social-Family Hx


Patient Social History


Alcohol Use:  Denies Use


Recreational Drug Use:  No


Smoking Status:  Never a Smoker


Recent Foreign Travel:  No


Contact w/Someone Who Travel:  No


Recent Hopitalizations:  Yes (Rt hip fx post fall)


Physical Abuse Screen:  No


Sexual Abuse:  No





Seasonal Allergies


Seasonal Allergies:  Yes





Surgeries


HX Surgeries:  Yes (right hemiarthroplasty)


Surgeries:  Adenoidectomy, Breast





Respiratory


Hx Respiratory Disorders:  Yes


Respiratory Disorders:  Pneumonia





Cardiovascular


Hx Cardiac Disorders:  Yes


Cardiac Disorders:  Chronic Edema/Swelling





Neurological


Hx Neurological Disorders:  No





Genitourinary


Hx Genitourinary Disorders:  No





Gastrointestinal


Hx Gastrointestinal Disorders:  Yes (anal fissure)


Gastrointestinal Disorders:  Hemorrhoids, Chronic Diarrhea





Musculoskeletal


Hx Musculoskeletal Disorders:  Yes (chronic neck and back pain)


Musculoskeletal Disorders:  Chronic Back Pain





Endocrine


Hx Endocrine Disorders:  No





HEENT


HX ENT Disorders:  Yes (aerosols cause irritation)


Hearing Impairment:  Hard of Hearing





Cancer


Hx Cancer:  No





Psychosocial


Hx Psychiatric Problems:  No





Blood Transfusions


Hx Blood Disorders:  No





Family Medical History


Significant Family History:  No Pertinent Family Hx





Review of Systems-General


EENTM:  hearing loss, other, see HPI





Physical Exam-General Problems


Physical Exam


Vital Signs





Vital Sign - Last 12Hours








 17





 05:31


 


Temp 97.9


 


Pulse 87


 


Resp 18


 


B/P (MAP) 137/66


 


Pulse Ox 97


 


O2 Delivery Room Air





Capillary Refill : Less Than 3 SecondsNONE


Comments


ENT exam : Ears Left EAC cleat TM normal no fluid or infection  : Right EAC 

with small cerumen impaction obstructing view of TM removed with curette  TM 

then visualized and Normal no fluid or infection 


pt stated family c/o hearing problems but she denies any acute changes in 

hearing and had no trouble understanding exam today with loud clear voice 


pt denies spinning quality with dizziness and stated she just felt off / 

lightheaded one episode earlier today none since 


referral back to PCP for dizziness due to not normal presentation for vertigo





Assessment/Plan


Assessment/Plan


Admission Diagnosis/Plan


Assessment: 


1. Right cerumen impaction 


2. Impaired Hearing Sensation 


3. Dysequilibrium 





Plan 


1. Cerumen removed at bedside pt tolerated well 


2. Pt can be seen after discharge for audio as outpatient at our [place] 

office 


3. Referral back to primary for dysequilibrium since has not reoccurred and had 

no spinning sensation with episode





Clinical Quality Measures


DVT/VTE Risk/Contraindication:


Risk Factor Score Per Nursin


RFS Level Per Nursing on Admit:  4+=Very High











CHARMAINE ESPARZA FNP 2017 18:31

## 2017-07-20 VITALS — DIASTOLIC BLOOD PRESSURE: 63 MMHG | SYSTOLIC BLOOD PRESSURE: 116 MMHG

## 2017-07-20 VITALS — SYSTOLIC BLOOD PRESSURE: 119 MMHG | DIASTOLIC BLOOD PRESSURE: 65 MMHG

## 2017-07-20 LAB
ALBUMIN SERPL-MCNC: 2 GM/DL (ref 3.2–4.5)
ALT SERPL-CCNC: 6 U/L (ref 0–55)
ANION GAP SERPL CALC-SCNC: 7 MMOL/L (ref 5–14)
AST SERPL-CCNC: 10 U/L (ref 5–34)
BASOPHILS # BLD AUTO: 0 10^3/UL (ref 0–0.1)
BASOPHILS NFR BLD AUTO: 0 % (ref 0–10)
BILIRUB SERPL-MCNC: 0.3 MG/DL (ref 0.1–1)
BUN SERPL-MCNC: 11 MG/DL (ref 7–18)
BUN/CREAT SERPL: 13
CALCIUM SERPL-MCNC: 8 MG/DL (ref 8.5–10.1)
CHLORIDE SERPL-SCNC: 109 MMOL/L (ref 98–107)
CO2 SERPL-SCNC: 23 MMOL/L (ref 21–32)
CREAT SERPL-MCNC: 0.83 MG/DL (ref 0.6–1.3)
EOSINOPHIL # BLD AUTO: 0.3 10^3/UL (ref 0–0.3)
EOSINOPHIL NFR BLD AUTO: 5 % (ref 0–10)
ERYTHROCYTE [DISTWIDTH] IN BLOOD BY AUTOMATED COUNT: 15.4 % (ref 10–14.5)
GFR SERPLBLD BASED ON 1.73 SQ M-ARVRAT: > 60 ML/MIN
GLUCOSE SERPL-MCNC: 99 MG/DL (ref 70–105)
LYMPHOCYTES # BLD AUTO: 0.7 X 10^3 (ref 1–4)
LYMPHOCYTES NFR BLD AUTO: 13 % (ref 12–44)
MCH RBC QN AUTO: 30 PG (ref 25–34)
MCHC RBC AUTO-ENTMCNC: 32 G/DL (ref 32–36)
MCV RBC AUTO: 93 FL (ref 80–99)
MONOCYTES # BLD AUTO: 0.7 X 10^3 (ref 0–1)
MONOCYTES NFR BLD AUTO: 13 % (ref 0–12)
NEUTROPHILS # BLD AUTO: 3.6 X 10^3 (ref 1.8–7.8)
NEUTROPHILS NFR BLD AUTO: 69 % (ref 42–75)
PLATELET # BLD: 386 10^3/UL (ref 130–400)
PMV BLD AUTO: 8.4 FL (ref 7.4–10.4)
POTASSIUM SERPL-SCNC: 3.7 MMOL/L (ref 3.6–5)
PROT SERPL-MCNC: 5.2 GM/DL (ref 6.4–8.2)
RBC # BLD AUTO: 2.57 10^6/UL (ref 4.35–5.85)
SODIUM SERPL-SCNC: 139 MMOL/L (ref 135–145)
WBC # BLD AUTO: 5.3 10^3/UL (ref 4.3–11)

## 2017-07-20 RX ADMIN — LOPERAMIDE HYDROCHLORIDE SCH MG: 2 CAPSULE ORAL at 07:49

## 2017-07-20 RX ADMIN — HYDROCODONE BITARTRATE AND ACETAMINOPHEN PRN TAB: 5; 325 TABLET ORAL at 07:50

## 2017-07-20 RX ADMIN — LORAZEPAM PRN MG: 0.5 TABLET ORAL at 15:35

## 2017-07-20 RX ADMIN — LEVOFLOXACIN SCH MLS/HR: 500 INJECTION, SOLUTION INTRAVENOUS at 11:38

## 2017-07-20 RX ADMIN — ENOXAPARIN SODIUM SCH MG: 100 INJECTION SUBCUTANEOUS at 07:51

## 2017-07-20 RX ADMIN — METRONIDAZOLE SCH MLS/HR: 5 INJECTION, SOLUTION INTRAVENOUS at 09:25

## 2017-07-20 RX ADMIN — HYDROCODONE BITARTRATE AND ACETAMINOPHEN PRN TAB: 5; 325 TABLET ORAL at 22:05

## 2017-07-20 RX ADMIN — METRONIDAZOLE SCH MLS/HR: 5 INJECTION, SOLUTION INTRAVENOUS at 02:20

## 2017-07-20 RX ADMIN — HYDROCORTISONE SCH APPLIC: 25 CREAM TOPICAL at 19:56

## 2017-07-20 RX ADMIN — HYDROCORTISONE SCH APPLIC: 25 CREAM TOPICAL at 07:52

## 2017-07-20 RX ADMIN — ONDANSETRON PRN MG: 4 TABLET, ORALLY DISINTEGRATING ORAL at 22:51

## 2017-07-20 RX ADMIN — POTASSIUM CHLORIDE SCH MEQ: 1500 TABLET, EXTENDED RELEASE ORAL at 05:42

## 2017-07-20 RX ADMIN — LACTOBACILLUS TAB SCH TAB.CHEW: TAB at 16:00

## 2017-07-20 RX ADMIN — LACTOBACILLUS TAB SCH TAB.CHEW: TAB at 05:42

## 2017-07-20 RX ADMIN — METRONIDAZOLE SCH MLS/HR: 5 INJECTION, SOLUTION INTRAVENOUS at 16:53

## 2017-07-20 RX ADMIN — Medication SCH OZ: at 07:50

## 2017-07-20 RX ADMIN — ANORECTAL OINTMENT SCH APPLIC: 15.7; .44; 24; 20.6 OINTMENT TOPICAL at 19:56

## 2017-07-20 RX ADMIN — LOPERAMIDE HYDROCHLORIDE SCH MG: 2 CAPSULE ORAL at 19:47

## 2017-07-20 RX ADMIN — LACTOBACILLUS TAB SCH TAB.CHEW: TAB at 11:38

## 2017-07-20 RX ADMIN — ANORECTAL OINTMENT SCH APPLIC: 15.7; .44; 24; 20.6 OINTMENT TOPICAL at 07:52

## 2017-07-20 RX ADMIN — POTASSIUM CHLORIDE SCH MEQ: 1500 TABLET, EXTENDED RELEASE ORAL at 19:52

## 2017-07-20 RX ADMIN — LORATADINE SCH MG: 10 TABLET ORAL at 07:50

## 2017-07-20 RX ADMIN — LACTOBACILLUS TAB SCH TAB.CHEW: TAB at 19:52

## 2017-07-20 NOTE — PHYSICAL THERAPY DAILY NOTE
PT Daily Note-Current


Subjective


Pt sitting in recliner with feet elevated talking to Dr Jo upon arrival.  

Pt agrees to PT.  Pt is anxious about tomorrow's procedure.





Pain





   Numeric Pain Scale:  5-Moderate Pain


   Location:  Right


   Location Body Site:  Foot


   Pain Description:  Ache, Tightness





Mental Status


Patient Orientation:  Person, Place, Time, Situation





Transfers


              Functional [place] Measure


0=Not Assessed/NA   4=Minimal Assistance


1=Total Assistance   5=Supervision or Setup


2=Maximal Assistance   6=Modified [place]


3=Moderate Assistance   7=Complete IndependenceIRFPAI Quality Coding Scale











6 Independent with activity with or without an assistive device


 


5  Patient requires set up or clean up by helper.  Patient completes activity  

by  themselves


 


4 Supervision or touching assist (CGA). [place] provide cues , steadying assist


 


3 The helper provides less than half the effort to complete the activity


 


2 The helper provides more than half the effort to complete the activity


 


1 Dependent.  The helper does all the effort to complete an activity 


 


7 Patient refused to complete or attempt activity


 


9 The patient did not perform the activity before the current illness or injury


 


88 Not attempted due to Medical conditions or safety concerns








Scootin


Sit to/from Stand:  5


Sit to Stand (QC):  5





Weight Bearing


Weight Bearing Restriction:  Full Weight Bearing


Location Restriction:  LE Bilateral





Gait Training


Does the Patient Walk?:  Yes


Distance (FIM):  3=150 ft


Distance:  150'


Walk 10 feet (QC):  5


Walk 50 ft with 2 Turns(QC):  5


Walk 150 ft (QC):  5


Gait Level of Assist:  5


Gait Persons Needed:  1


Gait Assistive Device:  FWW


Pt walks with slow and antalgic gait pattern.  Pt walks steady, no LOB but very 

anxious.





Wheelchair Training


Does the Pt Use a Wheelchair?:  No





Exercises


Seated Therapy Exercises:  Ankle pumps, Long arc quads, Hip flexion, Kicking 

activity


Seated Reps:  15


NuStep Minutes:  11


 NuStep Workload:  3





Treatments


PT, Nurse, Dr Jo & pt discussed upcoming prep for Colonoscopy tomorrow.  

Pt had several questions as far as what to expect and was getting anxious.  Pt 

transferred from recliner to standing using FWW at A.  Pt ambulated in 

hallway using FWW at [facility].  Pt completed Seated Ex before transferring to [facility] 

and using for 11m at Workload 3.  Pt ambulated back to room to rest in recliner 

at end of tx with all needs met.





Assessment


Current Status:  Fair Progress


Pt is anxious and has been preoccupied with upcoming procedure .





PT Short Term Goals


Short Term Goals


Time Frame:  2017


Transfers (B,C,W/C) (FIM):  4


Gait (FIM):  4


Gait Distance Comment:  150'


Gait Level of Assist:  4


Gait Assistive Device:  FWW


Wheelchair Distance:  50'





PT Long Term Goals


Long Term Goals


PT Long Term Goals Time Frame:  2017


Transfers (B,C,W/C) (FIM):  5 (met 17)


Sit to Lying (QC):  4 (met 17)


Lying-Sitting on Side/Bed(QC):  4 (met 17)


Sit to Stand (QC):  4 (met 17)


Rollin


Roll Left to Right (QC):  4 (met 17)


Chair/Bed-to-Chair Xfer(QC):  4


Gait (FIM):  5


Distance:  200'


Walk 10 feet (QC):  4


Walk 10ft-Uneven Surface(QC):  4


Walk 50ft with 2 Turns (QC):  4


Walk 150 ft (QC):  4


Gait Level of Assist:  5


Gait Assistive Device:  FWW


Stairs (FIM):  2 (met 17)


# of Steps:  4 (met 17)


1 Step (curb) (QC):  4 (met 17)


4 Steps (QC):  4 (met 17)


12 Steps (QC):  88


Stairs Level Of Assist:  4 (met 17)





PT Plan


Problem List


Problem List:  Activity Tolerance, Functional Strength, Safety, Balance, Gait





Treatment/Plan


Treatment Plan:  Continue Plan of Care


Treatment Plan:  Bed Mobility, Education, Functional Activity Ashley, Functional 

Strength, Group Therapy, Gait, Safety, Therapeutic Exercise, Transfers


Treatment Duration:  2017


Frequency:  At least 5-7 days/Wk (IRF)


Estimated Hrs Per Day:  1.5 hours per day


Patient and/or Family Agrees t:  Yes





Safety Risks/Education


Patient Education:  Gait Training, Transfer Techniques, Correct Positioning, 

Safety Issues


Teaching Recipient:  Patient


Teaching Methods:  Discussion


Response to Teaching:  Verbalize Understanding





Time/GCodes


Time In:  815


Time Out:  915


Total Billed Treatment Time:  60


Total Billed Treatment


visit, GT (15m), FA (15m) & EX x2 (30m)











DENNIS ALAN PTA 2017 09:03

## 2017-07-20 NOTE — PM & R (SOAP) PROGRESS NOTE
Subjective


Time Seen by Provider:  07:50


Subjective/Events-last exam


Patient was seen in her room this AM Informed her DR Howell will be doing 

Colonoscopy tomorrow Labd noted HGB <8 now and serum albumin has dropped to 2.0 

Patient SBA for transfers Fatiques easily.


Review of Systems


General:  Fatigue


Cardiovascular:  Edema





Objective


Exam


Last Set of Vital Signs





Vital Signs








  Date Time  Temp Pulse Resp B/P (MAP) Pulse Ox O2 Delivery O2 Flow Rate FiO2


 


7/20/17 05:20 98.9 88 16 116/63 91 Room Air  





Capillary Refill : Less Than 3 SecondsNONE


I&O











Intake and Output 


 


 7/20/17





 00:00


 


Intake Total 1640 ml


 


Balance 1640 ml


 


 


 


Intake Oral 1490 ml


 


IV Total 150 ml


 


# Voids 13


 


# Bowel Movements 1








General:  Alert, Oriented X3, Cooperative, No Acute Distress


HEENT:  Atraumatic, PERRLA, EOMI, Mucous Memb Moist/Pink


Neck:  Supple, No JVD


Lungs:  Clear to Auscultation


Heart:  Regular Rate


Abdomen:  Normal Bowel Sounds, Soft, No Tenderness


Extremities:  Other (edema 1+ BLES)


Neuro:  Other (guarding rt hip has functional strength distal RT Leg)





Results


Lab


Laboratory Tests


7/19/17 14:36: Glucometer 144H


7/20/17 05:39: 


White Blood Count 5.3, Red Blood Count 2.57L, Hemoglobin 7.6L, Hematocrit 24L, 

Mean Corpuscular Volume 93, Mean Corpuscular Hemoglobin 30, Mean Corpuscular 

Hemoglobin Concent 32, Red Cell Distribution Width 15.4H, Platelet Count 386, 

Mean Platelet Volume 8.4, Neutrophils (%) (Auto) 69, Lymphocytes (%) (Auto) 13, 

Monocytes (%) (Auto) 13H, Eosinophils (%) (Auto) 5, Basophils (%) (Auto) 0, 

Neutrophils # (Auto) 3.6, Lymphocytes # (Auto) 0.7L, Monocytes # (Auto) 0.7, 

Eosinophils # (Auto) 0.3, Basophils # (Auto) 0.0, Sodium Level 139, Potassium 

Level 3.7, Chloride Level 109H, Carbon Dioxide Level 23, Anion Gap 7, Blood 

Urea Nitrogen 11, Creatinine 0.83, Estimat Glomerular Filtration Rate > 60, BUN/

Creatinine Ratio 13, Glucose Level 99, Calcium Level 8.0L, Total Bilirubin 0.3, 

Aspartate Amino Transf (AST/SGOT) 10, Alanine Aminotransferase (ALT/SGPT) 6, 

Alkaline Phosphatase 111, Total Protein 5.2L, Albumin 2.0L





Microbiology


7/9/17 Stool Culture - Final, Complete


         Negative for Salmonella...





Assessment/Plan


Assessment


Displaced rt femoral neck frx  s/p Bipolar Billy arthroplasty rt hip DR Goodrich 

7/4/17


Postop anemia


Anemia of blood loss


Chronic diarrhea-associated with Stool +for OB times one-DR Jo following 

and now with


FUO started on antibiotics empirically now afebrile


Hypoalbuminemia


Peripheral edema-associated with Hypoalbuminemia-nutritional supplement ordered 

and legs elevated as much as possible


Postop DVT prophylaxis on Lovenox Sub CUT


Hypokalemia replaced


Idiopathhic neuropathy


Onychomycosis


Hammer digit syndrome


Hyperkeratosis





Plan


Continue PT/OT as tolerated 


Bowel prep ordered for Colonoscopy tomorrow -


DR Jo Manageing Diarrhea- and antibiotics-He will consider colonoscopy at 

a later time perhaps as an outpatient


D/C Questran as patients request


-Immodium ordered=adjust dose as needed


Probiotic d/cd as patients request=She feels that it may be contributing to her 

loose stools


.Elevate legs as much as possible


 Continue with Nutritional supplement as tolerated


 Team Conference held yesterday- 7/19/17-See report for full functional update 

and POC and ELOS


F/U with SW tomorrow re possible SNU placement upon completion of IV antibiotics


Antibiotic for Low grade fever on an empirical basis


Recheck Labs-done


See orders











SHIN DEL VALLE MD Jul 20, 2017 08:11

## 2017-07-20 NOTE — OCCUPATIONAL THER DAILY NOTE
OT Current Status-Daily Note


Subjective


Pt alert, sitting in recliner.  Pt agreed to therapy.  Pt anxious about taking 

a shower today or tomorrow due to her fatigue level today then how she would 

feel after her colonoscopy.  BAXTER encouraged pt and pt agreed to take shower.





Mental Status/Objective


Patient Orientation:  Person, Place, Time, Situation


              Functional [place] Measure


0=Not Assessed/NA   4=Minimal Assistance


1=Total Assistance   5=Supervision or Setup


2=Maximal Assistance   6=Modified [place]


3=Moderate Assistance   7=Complete [place]


Attachments:  IV





ADL-Treatment


Pt very anxious and not sure when it would be better to take shower, today or 

tomorrow.  BAXTER encouraged today since it will be later this evening when pt 

will have colonoscopy and may feel increased fatigue tomorrow.  Sit to stand 

with SBA then ambulated into bathroom using FWW with SBA.  Pt transferred to 

toilet with SBA and complete toileting with SBA.  Pt seems unsure of next steps 

though when given a moment she is able to come up with next step.  Pt 

transferred into shower with CGA using FWW, shower chair and grabbars.  Pt was 

able to bathe self with SBA in standing using grabbars to cleanse buttocks/destiny 

area then seated bathed rest of body using long handle sponge.  Pt was able to 

doff/don nightgown by self after set up.  Assist to get briefs over feet while 

using AE.  Increased edema on R LE so unable to use sockaide effectively at 

this time.  Pt complete grooming at w/c level by self.  Mod A to get into bed, 

assist to lift LE's into bed.  Pt feet elevated and R foot rewrapped due to 

tightness of wraps at that time.  Nrsg in room after therapy.  Pt took 

increased time to complete all ADL and functional tasks.  Call light/phone in 

reach.  All needs met in room.


              Functional [place] Measure


0=Not Assessed/NA   4=Minimal Assistance


1=Total Assistance   5=Supervision or Setup


2=Maximal Assistance   6=Modified [place]


3=Moderate Assistance   7=Complete IndependenceIRFPAI Quality Coding Scale











6 Independent with activity with or without an assistive device


 


5  Patient requires set up or clean up by helper.  Patient completes activity  

by  themselves


 


4 Supervision or touching assist (CGA). [place] provide cues , steadying assist


 


3 The helper provides less than half the effort to complete the activity


 


2 The helper provides more than half the effort to complete the activity


 


1 Dependent.  The helper does all the effort to complete an activity 


 


7 Patient refused to complete or attempt activity


 


9 The patient did not perform the activity before the current illness or injury


 


88 Not attempted due to Medical conditions or safety concerns








Grooming (FIM):  6 (Sitting at w/c level, pt is able to complete all grooming 

by self.)


Bathing (FIM):  5 (Using shower bench, long handle sponge and hand held shower 

pt able to complete areas while sitting with SBA in standing to cleanse 

buttocks and destiny area.)


Upper Body (FIM):  5


Lower Body Dressing (FIM):  3


Toileting (FIM):  4


Transfers (B, C, W/C) (FIM):  5


Toilet/Commode Transfer (FIM):  5


Shower Transfer(FIM):  4





OT Short Term Goals


Short Term Goals


Time Frame:  2017


Bathing(FIM):  3


Lower Body Dressing(FIM):  3


Toileting(FIM):  3


Transfers (B,C,W/C) (FIM):  4


Shower Transfer(FIM):  4


Additional Short Term Goals:  1-Demonstrate ADL Tasks, 2-Verbalize Understanding

, 3-ImproveStrength/Ashley


1=Demonstrate adherence to instructed precautions during ADL tasks.


2=Patient will verbalize/demonstrate understanding of assistive devices/

modifications for ADL.


3=Patient will improve strength/tolerance for activity to enable patient to 

perform ADL's.





OT Long Term Goals


Long Term Goals


Time Frame:  2017


Eating (FIM):  6


Eating (QC):  6


Groomin


Oral Hygiene (QC):  6


Bathing(FIM):  5


Shower/Bathe Self (QC):  5


Upper Body Dressing(FIM):  5


Upper Body Dressing (QC):  5


Lower Body Dressing(FIM):  5


Lower Body Dressing (QC):  5


On/Off Footwear (QC):  5


Toileting(FIM):  6


Toileting Hygiene (QC):  6


Toilet/Commode Transfer(FIM):  6


Toilet/Commode Transfer (QC):  6


Shower Transfer(FIM):  5


Additional Goals:  1-Demonstrate ADL Tasks, 2-Verbalize Understanding, 3-

ImproveStrength/Ashley


1=Demonstrate adherence to instructed precautions during ADL tasks.


2=Patient will verbalize/demonstrate understanding of assistive devices/

modifications for ADL.


3=Patient will improve strength/tolerance for activity to enable patient to 

perform ADL's.





OT Education/Plan


Discharge Recommendations


Plan/Recommendations:  Continue POC





Treatment Plan/Plan of Care


Patient would benefit from OT for education, treatment and training to promote 

independence in ADL's, mobility, safety and/or upper extremity function for ADL'

s.


Plan of Care:  ADL Retraining, Functional Mobility, Group Exercise/Act as Ind, 

UE Funct Exercise/Act


Treatment Duration:  2017


Frequency:  Twice Daily


Estimated Hrs Per Day:  1.5 hours per day


Agreement:  Yes


Rehab Potential:  Fair





Time/GCodes


Start Time:  10:00


Stop Time:  12:00


Total Time Billed (hr/min):  120


Billed Treatment Time


1 visit-ADL 4 (60 min)  FA 2 (30 min)











KHADAR GARCIA 2017 11:51

## 2017-07-20 NOTE — PROGRESS NOTE-HOSPITALIST
Subjective


HPI/CC On Admission


Date Seen by Provider:  Jul 20, 2017


Time Seen by Provider:  08:00


Mrs. Campbell and used to have diarrhea with poor appetite.  She's had some low-

grade temperature.  She denies abdominal pain or distention.  She continues to 

have lower extremity swelling unchanged and has not seen any blood in her 

stool.  With Lomotil diarrhea has diminished a little but still averaging 7-8 

loose to semi-formed stools.  She has been anxious about the thought of 

returning home.  Currently she would not be able to care for herself due to 

diarrhea and fear of accidents.  This in addition to continued fatigue with 

poor appetite.  She is sided appropriately to look into skilled nursing care 

when discharged from acute rehabilitation.


Subjective/Events-last exam


Mrs. Larry reports her diarrhea has slowed down.  She has seen small amount 

of red blood occasionally.  As she still is very anxious about the underlying 

cause of the diarrhea and her ongoing swelling.  Her by mouth intake has 

improved over the past 48 hours as well.  She's had no significant abdominal 

pain.  She denies any chest discomfort or increased dyspnea on exertion over 

her baseline.





Objective


Exam


Vital Signs





Vital Sign - Last 12Hours








 7/14/17





 05:00


 


Temp 98.9


 


Pulse 104


 


Resp 24


 


B/P (MAP) 91/53


 


Pulse Ox 97


 


O2 Delivery Room Air





Capillary Refill : Less Than 3 SecondsNONE


General Appearance:  Anxious, Chronically ill


Respiratory:  Chest Non Tender, Lungs Clear, Normal Breath Sounds, No Accessory 

Muscle Use, No Respiratory Distress


Cardiovascular:  Regular Rate, Rhythm, No Edema, No Gallop, No JVD, No Murmur, 

Normal Peripheral Pulses


Gastrointestinal:  Normal Bowel Sounds, No Organomegaly, Soft, Other (No mass 

noted mild left lower quadrant abdominal pain to palpation without rebound or 

guarding.)





Results/Procedures


Lab


Laboratory Tests


7/20/17 05:39














Assessment/Plan


Assessment and Plan


Assess & Plan/Chief Complaint


1.  With abrupt onset of diarrhea and current low-grade fever despite negative 

cultures and infectious etiology is a strong possibility.  Overflow diarrhea 

around her subjective mass is clearly a concern as well.  Discussed bowel prep 

with staff and the patient and the importance of doing her best to finish it.  

Strategies were she remains on the commode after diarrhea begins were discussed 

as well.  She'll be set up for colonoscopy tomorrow morning.  We will give low-

dose Ativan an hour before the prep and 5 mg of IV Reglan half an hour before 

PEG prep.


2.  Situational anxiety aggravating number 1.  Number 3 malnutrition secondary 

to number 1 contributing to pedal edema continue elevation.


3.  There has been progression of anemia but the patient is hemodynamically 

stable.  There is not been significant blood loss noted is most likely chronic 

disease for malnutrition.  There is clearly however concern for underlying 

colon cancer however her MCV remains at the upper limits of normal suggesting 

that iron deficiency is not the primary cause.  We'll hold off on blood 

transfusion for now reevaluating after colonoscopy in the morning.











MANNY SALGADO MD Jul 20, 2017 08:38

## 2017-07-21 VITALS — SYSTOLIC BLOOD PRESSURE: 132 MMHG | DIASTOLIC BLOOD PRESSURE: 76 MMHG

## 2017-07-21 VITALS — DIASTOLIC BLOOD PRESSURE: 78 MMHG | SYSTOLIC BLOOD PRESSURE: 127 MMHG

## 2017-07-21 RX ADMIN — ANORECTAL OINTMENT SCH APPLIC: 15.7; .44; 24; 20.6 OINTMENT TOPICAL at 20:29

## 2017-07-21 RX ADMIN — METRONIDAZOLE SCH MLS/HR: 5 INJECTION, SOLUTION INTRAVENOUS at 01:35

## 2017-07-21 RX ADMIN — METRONIDAZOLE SCH MLS/HR: 5 INJECTION, SOLUTION INTRAVENOUS at 17:26

## 2017-07-21 RX ADMIN — HYDROCORTISONE SCH APPLIC: 25 CREAM TOPICAL at 09:00

## 2017-07-21 RX ADMIN — IBUPROFEN PRN MG: 200 TABLET, FILM COATED ORAL at 21:00

## 2017-07-21 RX ADMIN — ANORECTAL OINTMENT SCH APPLIC: 15.7; .44; 24; 20.6 OINTMENT TOPICAL at 09:00

## 2017-07-21 RX ADMIN — ENOXAPARIN SODIUM SCH MG: 100 INJECTION SUBCUTANEOUS at 13:01

## 2017-07-21 RX ADMIN — METRONIDAZOLE SCH MLS/HR: 5 INJECTION, SOLUTION INTRAVENOUS at 09:20

## 2017-07-21 RX ADMIN — LORATADINE SCH MG: 10 TABLET ORAL at 09:00

## 2017-07-21 RX ADMIN — LACTOBACILLUS TAB SCH TAB.CHEW: TAB at 17:27

## 2017-07-21 RX ADMIN — LOPERAMIDE HYDROCHLORIDE SCH MG: 2 CAPSULE ORAL at 09:00

## 2017-07-21 RX ADMIN — METHYLPREDNISOLONE SODIUM SUCCINATE SCH MG: 40 INJECTION, POWDER, FOR SOLUTION INTRAMUSCULAR; INTRAVENOUS at 20:27

## 2017-07-21 RX ADMIN — HYDROCORTISONE SCH APPLIC: 25 CREAM TOPICAL at 20:29

## 2017-07-21 RX ADMIN — Medication SCH OZ: at 09:00

## 2017-07-21 RX ADMIN — LOPERAMIDE HYDROCHLORIDE SCH MG: 2 CAPSULE ORAL at 20:35

## 2017-07-21 RX ADMIN — LACTOBACILLUS TAB SCH TAB.CHEW: TAB at 11:00

## 2017-07-21 NOTE — PROGRESS NOTE-HOSPITALIST
Subjective


HPI/CC On Admission


Date Seen by Provider:  Jul 21, 2017


Time Seen by Provider:  11:45


Mrs. Campbell and used to have diarrhea with poor appetite.  She's had some low-

grade temperature.  She denies abdominal pain or distention.  She continues to 

have lower extremity swelling unchanged and has not seen any blood in her 

stool.  With Lomotil diarrhea has diminished a little but still averaging 7-8 

loose to semi-formed stools.  She has been anxious about the thought of 

returning home.  Currently she would not be able to care for herself due to 

diarrhea and fear of accidents.  This in addition to continued fatigue with 

poor appetite.  She is sided appropriately to look into skilled nursing care 

when discharged from acute rehabilitation.


Subjective/Events-last exam


Mrs. Larry was able tolerate the prep and the nursing staff report that her 

stools are relatively clear.  She had no abdominal pain and was doing a little 

better in regards to her diarrhea prior to the onset of her prep.  She's had no 

chills or fever.  She did note a small amount of blood with several of her 

stools bright red.





Objective


Exam


Vital Signs





Vital Sign - Last 12Hours








 7/15/17





 05:00


 


Temp 97.3


 


Pulse 84


 


Resp 20


 


B/P (MAP) 104/63


 


Pulse Ox 93


 


O2 Delivery Room Air





Capillary Refill : Less Than 3 SecondsNONE


General Appearance:  Anxious, Chronically ill


Respiratory:  Chest Non Tender, Lungs Clear, Normal Breath Sounds, No Accessory 

Muscle Use, No Respiratory Distress


Cardiovascular:  Regular Rate, Rhythm, No Edema, No Gallop, No JVD, No Murmur, 

Normal Peripheral Pulses


Gastrointestinal:  Normal Bowel Sounds, No Organomegaly, No Pulsatile Mass, Non 

Tender, Soft


Extremity:  Other (Plus edema with mild erythema is diminished from yesterday.)


Neurologic/Psychiatric:  Alert, Oriented x3


Skin:  Pallor





Assessment/Plan


Assessment and Plan


Assess & Plan/Chief Complaint


1.  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  There was somewhat of a 

demarcation line at the distal ascending colon with less involvement of the 

cecum proximal ascending admitting ascending colon.  There are several areas 

have the appearance of pseudopolyp formation that were biopsied.  No definitive 

pseudomembrane formation was noted.  We did send stool for repeat enteric 

pathogens and C. difficile toxin.  Until we get the biopsy reports back I will 

continue antibiotics but we will add Solu-Medrol 40 now than 30 twice a day.  

Because of her frail status malnutrition and combined anemia of blood loss from 

her colitis and chronic disease from underlying inflammation we will repeat her 

blood counts over the weekend and not be surprised for white count goes up due 

to steroid effect.


2.  Situational anxiety aggravating number 1.  Number 3 malnutrition secondary 

to number 1 contributing to pedal edema continue elevation.


3.  There has been progression of anemia but the patient is hemodynamically 

stable.  Hold transfusion for now.











MANNY SALGADO MD Jul 21, 2017 11:53

## 2017-07-21 NOTE — PRE-OP NOTE & CONSCIOUS SEDAT
Pre-Operative Progress Note


H&P Reviewed


The H&P was reviewed, patient examined and no changes noted.


Date H&P Reviewed:  Jul 21, 2017


Time H&P Reviewed:  07:50





Conscious Sedation Pre-Proced


ASA Class:  3











Airway Mallampati Classification: ([place] appropriate class) I.  II.  III,  IV


 


Lungs 


 


Heart 


 


 ASA score


 


 ASA 1: a normal healthy patient


 


 ASA 2:  a patient with a mild systemic disease (mid diabetes, controlled 

hypertension, obesity 


 


 ASA 3:  a patient with a severe systemic disease that limits activity  (angina

, COPD, prior Myocardial infarction)


 


 ASA 4:  a patient with an incapacitating disease that is a constant threat to 

life (CHF, renal failure)


 


 ASA 5:  a moribund patient not expected to survive 24 hrs.  (ruptured aneurysm)


 


 ASA 6:  a declared brain dead patient whose organs are being harvested.


 


 For emergent operations, add the letter E after the classification








Grade 2


Sedation Plan:  Analgesia, Amnesia, Plan communicated to team members, 

Discussed options with patient/fam, Discussed risks with patient/fam


Note


The patient is an appropriate candidate to undergo the planned procedure, 

sedation, and anesthesia.





The patient immediately re-assessed prior to indication.











MANNY SALGADO MD Jul 21, 2017 10:45

## 2017-07-21 NOTE — PM & R (SOAP) PROGRESS NOTE
Subjective


Time Seen by Provider:  07:45


Subjective/Events-last exam


Patient was seen in her room this AM Stewart from [facility] for Colonoscopy this AM 

Discussed case with therapy staff. Therapy on hold today due to Procedure.





Objective


Exam


Last Set of Vital Signs





Vital Signs








  Date Time  Temp Pulse Resp B/P (MAP) Pulse Ox O2 Delivery O2 Flow Rate FiO2


 


7/21/17 05:11 98.9 96 18 127/78 93 Room Air  





Capillary Refill : Less Than 3 SecondsNONE


I&O











Intake and Output 


 


 7/21/17





 00:00


 


Intake Total 1340 ml


 


Balance 1340 ml


 


 


 


Intake Oral 1240 ml


 


IV Total 100 ml


 


# Voids 9


 


# Bowel Movements 7








General:  Alert, Oriented X3, Cooperative, No Acute Distress


HEENT:  Atraumatic, PERRLA, EOMI, Mucous Memb Moist/Pink


Neck:  Supple, No JVD


Lungs:  Clear to Auscultation


Heart:  Regular Rate


Abdomen:  Normal Bowel Sounds, Soft, No Tenderness


Extremities:  Other (edema 1+ BLES)


Neuro:  Other (guarding rt hip has functional strength distal RT Leg)





Results


Lab


Laboratory Tests


7/19/17 14:36: Glucometer 144H


7/20/17 05:39: 


White Blood Count 5.3, Red Blood Count 2.57L, Hemoglobin 7.6L, Hematocrit 24L, 

Mean Corpuscular Volume 93, Mean Corpuscular Hemoglobin 30, Mean Corpuscular 

Hemoglobin Concent 32, Red Cell Distribution Width 15.4H, Platelet Count 386, 

Mean Platelet Volume 8.4, Neutrophils (%) (Auto) 69, Lymphocytes (%) (Auto) 13, 

Monocytes (%) (Auto) 13H, Eosinophils (%) (Auto) 5, Basophils (%) (Auto) 0, 

Neutrophils # (Auto) 3.6, Lymphocytes # (Auto) 0.7L, Monocytes # (Auto) 0.7, 

Eosinophils # (Auto) 0.3, Basophils # (Auto) 0.0, Sodium Level 139, Potassium 

Level 3.7, Chloride Level 109H, Carbon Dioxide Level 23, Anion Gap 7, Blood 

Urea Nitrogen 11, Creatinine 0.83, Estimat Glomerular Filtration Rate > 60, BUN/

Creatinine Ratio 13, Glucose Level 99, Calcium Level 8.0L, Total Bilirubin 0.3, 

Aspartate Amino Transf (AST/SGOT) 10, Alanine Aminotransferase (ALT/SGPT) 6, 

Alkaline Phosphatase 111, Total Protein 5.2L, Albumin 2.0L





Microbiology


7/9/17 Stool Culture - Final, Complete


         Negative for Salmonella...





Assessment/Plan


Assessment


Displaced rt femoral neck frx  s/p Bipolar Billy arthroplasty rt hip DR Goodrich 

7/4/17


Postop anemia


Anemia of blood loss


Chronic diarrhea-associated with Stool +for OB times one-DR Jo following 

and now with


FUO started on antibiotics empirically now afebrile


Hypoalbuminemia


Peripheral edema-associated with Hypoalbuminemia-nutritional supplement ordered 

and legs elevated as much as possible


Postop DVT prophylaxis on Lovenox Sub CUT


Hypokalemia replaced


Idiopathhic neuropathy


Onychomycosis


Hammer digit syndrome


Hyperkeratosis





Plan


Resume PT/OT tomorrow 


Bowel prep ordered  and done for Colonoscopy today  -


DR Jo Manageing Diarrhea- and anemia-Colonoscopy pending for 11 AM


D/C Questran as patients request


-Immodium ordered=adjust dose as needed


Probiotic d/cd as patients request=She feels that it may be contributing to her 

loose stools


.Elevate legs as much as possible


 Continue with Nutritional supplement as tolerated


 Team Conference held yesterday- 7/19/17-See report for full functional update 

and POC and ELOS


F/U with SW tomorrow re possible SNU placement next week


Antibiotic for Low grade fever on an empirical basis-done


Recheck Labs-done


F/U with results of colonoscopy once completed











SHIN DEL VALLE MD Jul 21, 2017 08:25

## 2017-07-22 VITALS — SYSTOLIC BLOOD PRESSURE: 107 MMHG | DIASTOLIC BLOOD PRESSURE: 60 MMHG

## 2017-07-22 VITALS — DIASTOLIC BLOOD PRESSURE: 64 MMHG | SYSTOLIC BLOOD PRESSURE: 125 MMHG

## 2017-07-22 RX ADMIN — METHYLPREDNISOLONE SODIUM SUCCINATE SCH MG: 40 INJECTION, POWDER, FOR SOLUTION INTRAMUSCULAR; INTRAVENOUS at 08:27

## 2017-07-22 RX ADMIN — METRONIDAZOLE SCH MLS/HR: 5 INJECTION, SOLUTION INTRAVENOUS at 08:28

## 2017-07-22 RX ADMIN — LEVOFLOXACIN SCH MLS/HR: 500 INJECTION, SOLUTION INTRAVENOUS at 11:44

## 2017-07-22 RX ADMIN — METRONIDAZOLE SCH MLS/HR: 5 INJECTION, SOLUTION INTRAVENOUS at 01:43

## 2017-07-22 RX ADMIN — LORAZEPAM PRN MG: 0.5 TABLET ORAL at 16:56

## 2017-07-22 RX ADMIN — IBUPROFEN PRN MG: 200 TABLET, FILM COATED ORAL at 20:42

## 2017-07-22 RX ADMIN — POTASSIUM CHLORIDE SCH MEQ: 1500 TABLET, EXTENDED RELEASE ORAL at 06:22

## 2017-07-22 RX ADMIN — ENOXAPARIN SODIUM SCH MG: 100 INJECTION SUBCUTANEOUS at 08:26

## 2017-07-22 RX ADMIN — IBUPROFEN PRN MG: 200 TABLET, FILM COATED ORAL at 09:00

## 2017-07-22 RX ADMIN — ANORECTAL OINTMENT SCH APPLIC: 15.7; .44; 24; 20.6 OINTMENT TOPICAL at 20:43

## 2017-07-22 RX ADMIN — LORATADINE SCH MG: 10 TABLET ORAL at 08:25

## 2017-07-22 RX ADMIN — LACTOBACILLUS TAB SCH TAB.CHEW: TAB at 06:22

## 2017-07-22 RX ADMIN — LACTOBACILLUS TAB SCH TAB.CHEW: TAB at 11:44

## 2017-07-22 RX ADMIN — Medication SCH OZ: at 08:27

## 2017-07-22 RX ADMIN — LOPERAMIDE HYDROCHLORIDE SCH MG: 2 CAPSULE ORAL at 08:26

## 2017-07-22 RX ADMIN — METHYLPREDNISOLONE SODIUM SUCCINATE SCH MG: 40 INJECTION, POWDER, FOR SOLUTION INTRAMUSCULAR; INTRAVENOUS at 20:42

## 2017-07-22 RX ADMIN — ANORECTAL OINTMENT SCH APPLIC: 15.7; .44; 24; 20.6 OINTMENT TOPICAL at 08:28

## 2017-07-22 RX ADMIN — LACTOBACILLUS TAB SCH TAB.CHEW: TAB at 16:56

## 2017-07-22 RX ADMIN — LOPERAMIDE HYDROCHLORIDE SCH MG: 2 CAPSULE ORAL at 08:25

## 2017-07-22 RX ADMIN — HYDROCORTISONE SCH APPLIC: 25 CREAM TOPICAL at 20:43

## 2017-07-22 RX ADMIN — LOPERAMIDE HYDROCHLORIDE SCH MG: 2 CAPSULE ORAL at 20:42

## 2017-07-22 RX ADMIN — HYDROCORTISONE SCH APPLIC: 25 CREAM TOPICAL at 08:28

## 2017-07-22 RX ADMIN — METRONIDAZOLE SCH MLS/HR: 5 INJECTION, SOLUTION INTRAVENOUS at 16:56

## 2017-07-22 NOTE — PHYSICAL THERAPY DAILY NOTE
PT Daily Note-Current


Subjective


Patient in recliner pre tx, agrees to PT, she says she has a lot of pain but 

has already had pain meds, 8/10 pain right hip.  Patient has had a BM in the 

chair, she had a colonoscopy yesterday, she will need to ambulate to the 

bathroom and use it and get cleaned up.





Appearance


Patient BTB post tx with nurse call, phone, tray, all needs met.





Mental Status


Patient Orientation:  Normal For Age





Transfers


              Functional [place] Measure


0=Not Assessed/NA   4=Minimal Assistance


1=Total Assistance   5=Supervision or Setup


2=Maximal Assistance   6=Modified [place]


3=Moderate Assistance   7=Complete IndependenceIRFPAI Quality Coding Scale











6 Independent with activity with or without an assistive device


 


5  Patient requires set up or clean up by helper.  Patient completes activity  

by  themselves


 


4 Supervision or touching assist (CGA). Maxwell provide cues , steadying assist


 


3 The helper provides less than half the effort to complete the activity


 


2 The helper provides more than half the effort to complete the activity


 


1 Dependent.  The helper does all the effort to complete an activity 


 


7 Patient refused to complete or attempt activity


 


9 The patient did not perform the activity before the current illness or injury


 


88 Not attempted due to Medical conditions or safety concerns








Transfers (B, C, W/C) (FIM):  4


Scootin


Rollin


Supine to/from Sit:  5


Sit to/from Stand:  4


CGA for sit to stand, patient was able to get back into bed and get both legs 

into bed without assist





Gait Training


Gait (FIM):  1


Distance:  10'x2


Gait Level of Assist:  4


Gait Persons Needed:  1


Gait Assistive Device:  FWW


slow, antalgic, CGA





Treatments


patient was also toileted and would not clean herself, total assist, also has a 

brief on that needed to be changed





Assessment


Current Status:  Poor Progress


total assist with toileting





PT Short Term Goals


Short Term Goals


Time Frame:  2017


Transfers (B,C,W/C) (FIM):  4


Gait (FIM):  4


Gait Distance Comment:  150'


Gait Level of Assist:  4


Gait Assistive Device:  FWW


Wheelchair Distance:  50'





PT Long Term Goals


Long Term Goals


PT Long Term Goals Time Frame:  2017


Transfers (B,C,W/C) (FIM):  5 (met 17)


Sit to Lying (QC):  4 (met 17)


Lying-Sitting on Side/Bed(QC):  4 (met 17)


Sit to Stand (QC):  4 (met 17)


Rollin


Roll Left to Right (QC):  4 (met 17)


Chair/Bed-to-Chair Xfer(QC):  4


Gait (FIM):  5


Distance:  200'


Walk 10 feet (QC):  4


Walk 10ft-Uneven Surface(QC):  4


Walk 50ft with 2 Turns (QC):  4


Walk 150 ft (QC):  4


Gait Level of Assist:  5


Gait Assistive Device:  FWW


Stairs (FIM):  2 (met 17)


# of Steps:  4 (met 17)


1 Step (curb) (QC):  4 (met 17)


4 Steps (QC):  4 (met 17)


12 Steps (QC):  88


Stairs Level Of Assist:  4 (met 17)





PT Plan


Problem List


Problem List:  Activity Tolerance, Functional Strength, Safety, Balance, Gait, 

Transfer, Bed Mobility, ROM





Treatment/Plan


Treatment Plan:  Continue Plan of Care


Treatment Plan:  Bed Mobility, Education, Functional Activity Ashley, Functional 

Strength, Group Therapy, Gait, Safety, Therapeutic Exercise, Transfers


Treatment Duration:  2017


Frequency:  At least 5-7 days/Wk (IRF)


Estimated Hrs Per Day:  1.5 hours per day


Patient and/or Family Agrees t:  Yes





Safety Risks/Education


Patient Education:  Gait Training, Transfer Techniques, Correct Positioning, 

Safety Issues


Teaching Recipient:  Patient


Teaching Methods:  Demonstration, Discussion


Response to Teaching:  Reinforcement Needed





Time/GCodes


Time In:  930


Time Out:  1000


Total Billed Treatment Time:  30


Total Billed Treatment


1 visit


GT 10'


FA 20'











YESSICA ESPANA PT 2017 10:26

## 2017-07-23 VITALS — SYSTOLIC BLOOD PRESSURE: 150 MMHG | DIASTOLIC BLOOD PRESSURE: 74 MMHG

## 2017-07-23 VITALS — DIASTOLIC BLOOD PRESSURE: 75 MMHG | SYSTOLIC BLOOD PRESSURE: 143 MMHG

## 2017-07-23 RX ADMIN — LOPERAMIDE HYDROCHLORIDE SCH MG: 2 CAPSULE ORAL at 20:36

## 2017-07-23 RX ADMIN — LORAZEPAM PRN MG: 0.5 TABLET ORAL at 13:30

## 2017-07-23 RX ADMIN — HYDROCORTISONE SCH APPLIC: 25 CREAM TOPICAL at 20:36

## 2017-07-23 RX ADMIN — POTASSIUM CHLORIDE SCH MEQ: 1500 TABLET, EXTENDED RELEASE ORAL at 09:07

## 2017-07-23 RX ADMIN — IBUPROFEN PRN MG: 200 TABLET, FILM COATED ORAL at 06:54

## 2017-07-23 RX ADMIN — METRONIDAZOLE SCH MLS/HR: 5 INJECTION, SOLUTION INTRAVENOUS at 09:08

## 2017-07-23 RX ADMIN — LOPERAMIDE HYDROCHLORIDE SCH MG: 2 CAPSULE ORAL at 09:07

## 2017-07-23 RX ADMIN — Medication SCH OZ: at 09:09

## 2017-07-23 RX ADMIN — ANORECTAL OINTMENT SCH APPLIC: 15.7; .44; 24; 20.6 OINTMENT TOPICAL at 20:36

## 2017-07-23 RX ADMIN — METHYLPREDNISOLONE SODIUM SUCCINATE SCH MG: 40 INJECTION, POWDER, FOR SOLUTION INTRAMUSCULAR; INTRAVENOUS at 09:08

## 2017-07-23 RX ADMIN — LACTOBACILLUS TAB SCH TAB.CHEW: TAB at 11:20

## 2017-07-23 RX ADMIN — ANORECTAL OINTMENT SCH APPLIC: 15.7; .44; 24; 20.6 OINTMENT TOPICAL at 09:07

## 2017-07-23 RX ADMIN — METRONIDAZOLE SCH MLS/HR: 5 INJECTION, SOLUTION INTRAVENOUS at 01:29

## 2017-07-23 RX ADMIN — METRONIDAZOLE SCH MLS/HR: 5 INJECTION, SOLUTION INTRAVENOUS at 17:14

## 2017-07-23 RX ADMIN — HYDROCORTISONE SCH APPLIC: 25 CREAM TOPICAL at 09:07

## 2017-07-23 RX ADMIN — ENOXAPARIN SODIUM SCH MG: 100 INJECTION SUBCUTANEOUS at 09:08

## 2017-07-23 RX ADMIN — LORATADINE SCH MG: 10 TABLET ORAL at 09:08

## 2017-07-23 RX ADMIN — METHYLPREDNISOLONE SODIUM SUCCINATE SCH MG: 40 INJECTION, POWDER, FOR SOLUTION INTRAMUSCULAR; INTRAVENOUS at 20:36

## 2017-07-23 RX ADMIN — LACTOBACILLUS TAB SCH TAB.CHEW: TAB at 06:54

## 2017-07-23 RX ADMIN — LACTOBACILLUS TAB SCH TAB.CHEW: TAB at 17:14

## 2017-07-24 VITALS — DIASTOLIC BLOOD PRESSURE: 76 MMHG | SYSTOLIC BLOOD PRESSURE: 147 MMHG

## 2017-07-24 VITALS — DIASTOLIC BLOOD PRESSURE: 75 MMHG | SYSTOLIC BLOOD PRESSURE: 144 MMHG

## 2017-07-24 LAB
ANION GAP SERPL CALC-SCNC: 10 MMOL/L (ref 5–14)
BASOPHILS # BLD AUTO: 0 10^3/UL (ref 0–0.1)
BASOPHILS NFR BLD AUTO: 0 % (ref 0–10)
BUN SERPL-MCNC: 21 MG/DL (ref 7–18)
BUN/CREAT SERPL: 26
CALCIUM SERPL-MCNC: 8.7 MG/DL (ref 8.5–10.1)
CHLORIDE SERPL-SCNC: 106 MMOL/L (ref 98–107)
CO2 SERPL-SCNC: 22 MMOL/L (ref 21–32)
CREAT SERPL-MCNC: 0.81 MG/DL (ref 0.6–1.3)
EOSINOPHIL # BLD AUTO: 0 10^3/UL (ref 0–0.3)
EOSINOPHIL NFR BLD AUTO: 0 % (ref 0–10)
ERYTHROCYTE [DISTWIDTH] IN BLOOD BY AUTOMATED COUNT: 16 % (ref 10–14.5)
GFR SERPLBLD BASED ON 1.73 SQ M-ARVRAT: > 60 ML/MIN
GLUCOSE SERPL-MCNC: 127 MG/DL (ref 70–105)
LYMPHOCYTES # BLD AUTO: 0.9 X 10^3 (ref 1–4)
LYMPHOCYTES NFR BLD AUTO: 15 % (ref 12–44)
MCH RBC QN AUTO: 30 PG (ref 25–34)
MCHC RBC AUTO-ENTMCNC: 32 G/DL (ref 32–36)
MCV RBC AUTO: 93 FL (ref 80–99)
MONOCYTES # BLD AUTO: 0.3 X 10^3 (ref 0–1)
MONOCYTES NFR BLD AUTO: 5 % (ref 0–12)
NEUTROPHILS # BLD AUTO: 5.1 X 10^3 (ref 1.8–7.8)
NEUTROPHILS NFR BLD AUTO: 80 % (ref 42–75)
PLATELET # BLD: 593 10^3/UL (ref 130–400)
PMV BLD AUTO: 8.5 FL (ref 7.4–10.4)
POTASSIUM SERPL-SCNC: 4.2 MMOL/L (ref 3.6–5)
RBC # BLD AUTO: 3.54 10^6/UL (ref 4.35–5.85)
SODIUM SERPL-SCNC: 138 MMOL/L (ref 135–145)
WBC # BLD AUTO: 6.3 10^3/UL (ref 4.3–11)

## 2017-07-24 PROCEDURE — 0DBP8ZX EXCISION OF RECTUM, VIA NATURAL OR ARTIFICIAL OPENING ENDOSCOPIC, DIAGNOSTIC: ICD-10-PCS | Performed by: INTERNAL MEDICINE

## 2017-07-24 PROCEDURE — 0DBL8ZX EXCISION OF TRANSVERSE COLON, VIA NATURAL OR ARTIFICIAL OPENING ENDOSCOPIC, DIAGNOSTIC: ICD-10-PCS | Performed by: INTERNAL MEDICINE

## 2017-07-24 PROCEDURE — 0DBK8ZX EXCISION OF ASCENDING COLON, VIA NATURAL OR ARTIFICIAL OPENING ENDOSCOPIC, DIAGNOSTIC: ICD-10-PCS | Performed by: INTERNAL MEDICINE

## 2017-07-24 RX ADMIN — LOPERAMIDE HYDROCHLORIDE SCH MG: 2 CAPSULE ORAL at 09:15

## 2017-07-24 RX ADMIN — HYDROCORTISONE SCH APPLIC: 25 CREAM TOPICAL at 09:15

## 2017-07-24 RX ADMIN — METRONIDAZOLE SCH MLS/HR: 5 INJECTION, SOLUTION INTRAVENOUS at 09:11

## 2017-07-24 RX ADMIN — HYDROCORTISONE SCH APPLIC: 25 CREAM TOPICAL at 20:15

## 2017-07-24 RX ADMIN — LACTOBACILLUS TAB SCH TAB.CHEW: TAB at 11:52

## 2017-07-24 RX ADMIN — Medication SCH OZ: at 09:14

## 2017-07-24 RX ADMIN — METHYLPREDNISOLONE SODIUM SUCCINATE SCH MG: 40 INJECTION, POWDER, FOR SOLUTION INTRAMUSCULAR; INTRAVENOUS at 20:15

## 2017-07-24 RX ADMIN — METRONIDAZOLE SCH MLS/HR: 5 INJECTION, SOLUTION INTRAVENOUS at 17:51

## 2017-07-24 RX ADMIN — METHYLPREDNISOLONE SODIUM SUCCINATE SCH MG: 40 INJECTION, POWDER, FOR SOLUTION INTRAMUSCULAR; INTRAVENOUS at 09:13

## 2017-07-24 RX ADMIN — ENOXAPARIN SODIUM SCH MG: 100 INJECTION SUBCUTANEOUS at 09:15

## 2017-07-24 RX ADMIN — ANORECTAL OINTMENT SCH APPLIC: 15.7; .44; 24; 20.6 OINTMENT TOPICAL at 20:16

## 2017-07-24 RX ADMIN — POTASSIUM CHLORIDE SCH MEQ: 1500 TABLET, EXTENDED RELEASE ORAL at 06:25

## 2017-07-24 RX ADMIN — LACTOBACILLUS TAB SCH TAB.CHEW: TAB at 17:51

## 2017-07-24 RX ADMIN — LOPERAMIDE HYDROCHLORIDE SCH MG: 2 CAPSULE ORAL at 20:14

## 2017-07-24 RX ADMIN — METRONIDAZOLE SCH MLS/HR: 5 INJECTION, SOLUTION INTRAVENOUS at 00:32

## 2017-07-24 RX ADMIN — ANORECTAL OINTMENT SCH APPLIC: 15.7; .44; 24; 20.6 OINTMENT TOPICAL at 09:15

## 2017-07-24 RX ADMIN — LACTOBACILLUS TAB SCH TAB.CHEW: TAB at 06:25

## 2017-07-24 NOTE — PROGRESS NOTE-HOSPITALIST
Subjective


HPI/CC On Admission


Date Seen by Provider:  Jul 24, 2017


Time Seen by Provider:  08:00


Mrs. Campbell and used to have diarrhea with poor appetite.  She's had some low-

grade temperature.  She denies abdominal pain or distention.  She continues to 

have lower extremity swelling unchanged and has not seen any blood in her 

stool.  With Lomotil diarrhea has diminished a little but still averaging 7-8 

loose to semi-formed stools.  She has been anxious about the thought of 

returning home.  Currently she would not be able to care for herself due to 

diarrhea and fear of accidents.  This in addition to continued fatigue with 

poor appetite.  She is sided appropriately to look into skilled nursing care 

when discharged from acute rehabilitation.


Subjective/Events-last exam


Mrs. Larry was ambulating in the hallway using her walker upon my arrival.  

There is been significant improvement in strength and stamina as well as 

increased appetite with resolution of diarrhea.  Despite all these positive 

things all she could focus on more litany of worries.  One of which was a lump 

in her right breast that she has noticed she believes for several months but 

felt again last night.  She does not believe that it is changed in size.  It is 

nontender.  She was worried about chronic dizziness mild right neck pain that 

is been there for years in addition to work that she reports needs to be done 

on her kitchen.  She exhibited  ruminating thought patterns about these issues 

of worry.  She denies abdominal pain or indigestion.  She has noted no blood in 

her stool except for small amount right after colonoscopy at which time rectal 

biopsies were performed.





Objective


Exam


Vital Signs





Vital Sign - Last 12Hours








 7/18/17





 05:50


 


Temp 97.9


 


Pulse 87


 


Resp 17


 


B/P (MAP) 126/69


 


Pulse Ox 95


 


O2 Delivery Room Air





Capillary Refill : Less Than 3 SecondsNONE


General Appearance:  Anxious, Moderate Distress (Due to anxiety)


HEENT:  Other (She has color in her cheeks for the first time)


Respiratory:  Chest Non Tender, Lungs Clear, Normal Breath Sounds, No Accessory 

Muscle Use, No Respiratory Distress


Cardiovascular:  Regular Rate, Rhythm, No Edema, No Gallop, No JVD, No Murmur, 

Normal Peripheral Pulses


Gastrointestinal:  Normal Bowel Sounds, No Organomegaly, No Pulsatile Mass, Non 

Tender, Soft


Extremity:  Other (2+ bilateral edema with resolution of erythema.  No 

ulceration noted.)


Lymphatic:  Other (On breast examination there is a 1 cm mobile nontender mass 

that is not hard but does have a solid feel to it there is right nipple 

inversion the patient reports is been present for at least 20 years no axillary 

adenopathy was noted.  The left breast was not examined.  No skin changes and 

inflammation ulceration etc. are noted.)





Results/Procedures


Lab


Laboratory Tests


7/24/17 06:45














Assessment/Plan


Assessment and Plan


Assess & Plan/Chief Complaint


1.  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  There was somewhat of a 

demarcation line at the distal ascending colon with less involvement of the 

cecum proximal ascending admitting ascending colon.  There are several areas 

have the appearance of pseudopolyp formation that were biopsied.  Fact 

ulcerative colitis histopathology pending.  Patient is responding to steroids 

and is still on antibiotics.  Will discontinue Levaquin and continue IV Flagyl 

for another day..


2.  Generalized anxiety aggravating number 1.  Discussed and strongly 

recommended that treatment.  We'll initiate low-dose  Zoloft 25 mg tonight with 

dose escalation provided the patient has no significant side effects.  

Discussed her baseline lightheadedness was likely all anxiety related as it is 

been going on for some time and actually gets better with activity and 

distraction.


3.  Anemia component of blood loss from the colon as well as chronic disease 

from probable underlying ulcerative colitis much improved up to 10.6.


4.  Right benign feeling breast mass will need workup in the future but right 

now it would only aggravate anxiety and likely become a major point of focal 

obstruction and worry will recommend workup after she is hopefully tolerating 

and benefiting from anxiolytic therapy.











MANNY SALGADO MD Jul 24, 2017 08:41

## 2017-07-24 NOTE — THERAPY GROUP DAILY NOTE
Therapy Daily Group Note


Patient Education Topic


Other List Below (pain)





Exercises


LE Seated Exercise, UE Exercise





Other/Notes


Pt ambulated to OT/PT group with CGA using FWW.  OT/PT group consisted of 

introductions (name, place living, what do you do to relax), speaker for pain 

education, breathing techniques and UE/LE seated exercises.  Pt was attentive 

throughout group.  Contributed to discussions by giving personal examples of 

how to work through pain.  Pt was appropriate with introductions and 

interactions with other group members.  Pt verbalized understanding of pain 

education.  Pt was able to complete breathing techniques appropriately.  Able 

to complete UE/LE seated exercises.  After therapy, pt sitting in recliner.  

Call light/phone in reach.  All needs met in room.


Start Time:  13:00


Stop Time:  14:05


Total Billed Treatment Time:  65


Total Billed Treatment


1-GRP











KHADAR GARCIA Jul 24, 2017 14:50

## 2017-07-24 NOTE — OCCUPATIONAL THER DAILY NOTE
OT Current Status-Daily Note


Subjective


Pt sitting in chair, worried about completing therapy secondary to recently 

placed IV in right arm. Pt agrees to therapy with encouragement.





Mental Status/Objective


              Functional [place] Measure


0=Not Assessed/NA   4=Minimal Assistance


1=Total Assistance   5=Supervision or Setup


2=Maximal Assistance   6=Modified [place]


3=Moderate Assistance   7=Complete [place]


Attachments:  IV





ADL-Treatment


RN states pt okay to shower with  IV covered. Pt sit to stand with modified 

independence. Gait to restroom with FWW. Pt transferred to walk in shower with 

SBA using grab bars for safety. Pt doffed clothing with SBA. Uses dressing 

stick to doff Depends and socks. Seated bathing completed using hand held 

shower and long handled sponge. Upper body bathing completed with set up. Pt 

stood with CGA for balance while washing buttocks and destiny area. Pt used long 

handled sponge to wash lower legs and feet. Pt repeatedly asks what she should 

do next throughout shower. Increased time for bathing. Pt donned pullover shirt 

with set up. Pt used reacher to start Depends and shorts over feet with 

increased time. Stood with supervision for balance during pant hike using FWW. 

Pt donned bilateral socks with minimal assistance using sock aid; cues required 

for proper use. Pt stood at sink for grooming tasks. Pt brushed teeth and 

combed hair with supervision. Pt sat in w/c to dry hair. Transfer to [facility] over 

toilet with SBA. Pt able to manage clothing and hygiene with SBA. Stood at sink 

to wash hands without assistance. Pt returned to chair with supervision using 

FWW. Pt requires direction throughout session and often states "I don't think I 

can do that" before even attempting task.  Pt is often able to complete task 

with encouragement and verbal cues. Pt sitting in chair with needs met after 

session.


              Functional [place] Measure


0=Not Assessed/NA   4=Minimal Assistance


1=Total Assistance   5=Supervision or Setup


2=Maximal Assistance   6=Modified [place]


3=Moderate Assistance   7=Complete IndependenceIRFPAI Quality Coding Scale











6 Independent with activity with or without an assistive device


 


5  Patient requires set up or clean up by helper.  Patient completes activity  

by  themselves


 


4 Supervision or touching assist (CGA). [place] provide cues , steadying assist


 


3 The helper provides less than half the effort to complete the activity


 


2 The helper provides more than half the effort to complete the activity


 


1 Dependent.  The helper does all the effort to complete an activity 


 


7 Patient refused to complete or attempt activity


 


9 The patient did not perform the activity before the current illness or injury


 


88 Not attempted due to Medical conditions or safety concerns








Eating (FIM):  5 (Pt reports receiving assist to open containers)


Eating (QC):  5 (set up)


Grooming (FIM):  5


Oral Hygiene (QC):  4 (supervision)


Bathing (FIM):  4


Shower/Bathe Self (QC):  4 (CGA)


Upper Body (FIM):  5


Upper Body Dressing (QC):  5 (set up)


Lower Body Dressing (FIM):  4


Lower Body Dressing (QC):  3


On/Off Footwear (QC):  3


Toileting (FIM):  5


Toileting Hygiene (QC):  4


Toilet/Commode Transfer (FIM):  5


Toilet Transfer (QC):  4


Shower Transfer(FIM):  5





OT Short Term Goals


Short Term Goals


Time Frame:  2017


Bathing(FIM):  3


Lower Body Dressing(FIM):  3


Toileting(FIM):  3


Transfers (B,C,W/C) (FIM):  4


Shower Transfer(FIM):  4


Additional Short Term Goals:  1-Demonstrate ADL Tasks, 2-Verbalize Understanding

, 3-ImproveStrength/Ashley


1=Demonstrate adherence to instructed precautions during ADL tasks.


2=Patient will verbalize/demonstrate understanding of assistive devices/

modifications for ADL.


3=Patient will improve strength/tolerance for activity to enable patient to 

perform ADL's.





OT Long Term Goals


Long Term Goals


Time Frame:  2017


Eating (FIM):  6 (not met)


Eating (QC):  6 (5-not met)


Groomin (5-not met)


Oral Hygiene (QC):  6 (4-not met)


Bathing(FIM):  5 (not met)


Shower/Bathe Self (QC):  5 (4-not met)


Upper Body Dressing(FIM):  5 (met 17)


Upper Body Dressing (QC):  5 (5-MET)


Lower Body Dressing(FIM):  5 (not met)


Lower Body Dressing (QC):  5 (3-not met)


On/Off Footwear (QC):  5 (3-not met)


Toileting(FIM):  6 (not met)


Toileting Hygiene (QC):  6 (4-not met)


Toilet/Commode Transfer(FIM):  6 (5-not met)


Toilet/Commode Transfer (QC):  6 (4-not met)


Shower Transfer(FIM):  5 (met 17)


Additional Goals:  1-Demonstrate ADL Tasks, 2-Verbalize Understanding, 3-

ImproveStrength/Ashley


1=Demonstrate adherence to instructed precautions during ADL tasks.


2=Patient will verbalize/demonstrate understanding of assistive devices/

modifications for ADL.


3=Patient will improve strength/tolerance for activity to enable patient to 

perform ADL's.





OT Education/Plan


Discharge Recommendations


Plan/Recommendations:  Continue POC





Treatment Plan/Plan of Care


Patient would benefit from OT for education, treatment and training to promote 

independence in ADL's, mobility, safety and/or upper extremity function for ADL'

s.


Plan of Care:  ADL Retraining, Functional Mobility, Group Exercise/Act as Ind, 

UE Funct Exercise/Act


Treatment Duration:  2017


Frequency:  Twice Daily


Estimated Hrs Per Day:  1.5 hours per day


Agreement:  Yes


Rehab Potential:  Fair





Time/GCodes


Start Time:  10:15


Stop Time:  11:40


Total Time Billed (hr/min):  85


Billed Treatment Time


1 visit, ADLx6(85minutes)











RADHA HOWARD OT 2017 11:54

## 2017-07-24 NOTE — OPERATIVE REPORT
PROCEDURE PHYSICIAN:   MANNY SALGADO

 

DATE OF PROCEDURE:  07/21/2017

 

COLONOSCOPY SUMMARY:   

 

PROCEDURE:

Colonoscopy was performed for evaluation of rectal bleeding with

diarrhea. 

 

The patient was placed in left lateral decubitus position. Prior

to undergoing colonoscopy, digital rectal evaluation was

performed. Anal sphincter tone was normal and the perianal reflex

was intact. No abnormalities were noted to digital inspection of

the anal canal or distal rectal vault. The colonoscope was then

inserted into the rectum and under direct visualization, advanced

to cecum. The cecum was identified by identification of the

ileocecal valve and cecal strap. Photographic documentation was

obtained. Careful inspection was made as colonoscope was

withdrawn. 

 

FINDINGS:

There was diffuse colonic inflammation with loss of usual

vascular markings with underlying erythema. There was somewhat of a

demarcation line present in the distal ascending colon. The mid

and proximal ascending colon and cecum revealed far less

inflammation and the ileocecal valve was unremarkable. There were

areas in the transverse colon  where there appeared to

be pseudopolyp formation. The most prominent one was biopsied and

submitted for pathology. Biopsies from the proximal ascending

colon, hepatic flexure, distal transverse colon and rectum were

obtained as well as photographs. There was no evidence for overt

pseudomembrane formation.A trap was placed and stool obtain and

sent for repeat enteric pathogens and C. diff. toxin evaluation.

 

 

A/P Pancolitis; although, the mid and proximal ascending

colon and cecum were not nearly as involved as colon distal to

this level. Findings are suspicious for underlying ulcerative

colitis although an infectious process considering this patient's

history is still in the differential diagnosis. Considering her

significant deconditioning and malnutrition for now, we will

continue IV antibiotics but add b.i.d. IV Solu-Medrol, will be

replaced when she is discharged by prednisone while we await

biopsy results. We will need to keep the patient in the hospital

for close monitoring at least over the weekend. 

 

 

 

Job ID: [id number]

Dictated Date: 07/21/2017 11:32:58 

Transcription Date: 07/24/2017 00:49:22 / wil MCLEOD

## 2017-07-24 NOTE — PHYSICAL THERAPY DAILY NOTE
PT Daily Note-Current


Subjective


pt. immediately states she is so worried about herself "they came and took 5 

vials of blood and now I feel so dizzy like I might even pass out" Pt. was 

encouraged by this PTA and the nurse as well as Dr Jo that she may be 

having anxiety and should concentrate on the positive and distract herself as 

much as possible. Pt. also distracted by what she feels may be a suspicious 

lump in her breast. Dr Jo palpated pts breast and mentioned that he may 

have her have a mammogram but wasnt real concerned as the small mass was soft 

and mobile,





Pain





   Numeric Pain Scale:  3


   Location:  Right


   Location Body Site:  Ankle


   Pain Description:  Ache


   Comment:  lateral area while on Nustep





Mental Status


Patient Orientation:  Normal For Age





Transfers


              Functional [place] Measure


0=Not Assessed/NA   4=Minimal Assistance


1=Total Assistance   5=Supervision or Setup


2=Maximal Assistance   6=Modified [place]


3=Moderate Assistance   7=Complete IndependenceIRFPAI Quality Coding Scale











6 Independent with activity with or without an assistive device


 


5  Patient requires set up or clean up by helper.  Patient completes activity  

by  themselves


 


4 Supervision or touching assist (CGA). [place] provide cues , steadying assist


 


3 The helper provides less than half the effort to complete the activity


 


2 The helper provides more than half the effort to complete the activity


 


1 Dependent.  The helper does all the effort to complete an activity 


 


7 Patient refused to complete or attempt activity


 


9 The patient did not perform the activity before the current illness or injury


 


88 Not attempted due to Medical conditions or safety concerns








Transfers (B, C, W/C) (FIM):  6


Scootin


Rollin


Roll Left to Right (QC):  6


Supine to/from Sit:  6


Sit to/from Stand:  6


Sit to Lying (QC):  6


Sit to Stand (QC):  6


Chair/Bed-to-Chair Xfer(QC):  6


Bed to/from Chair:  6





Weight Bearing


Weight Bearing Restriction:  Weight Bearing/Tolerated


Location Restriction:  R LE





Gait Training


Does the Patient Walk?:  Yes


Gait (FIM):  5


Distance (FIM):  3=150 ft (x3)


Walk 10 feet (QC):  5


Walk 50 ft with 2 Turns(QC):  5


Walk 150 ft (QC):  5


Walking 10ft/uneven surface-QC:  5


Gait Level of Assist:  5


Gait Persons Needed:  1


Gait Assistive Device:  FWW


good equal step length no LOB





Wheelchair Training


Does the Pt Use a Wheelchair?:  No





Stair Training


 Stair Training: Handrails/:  2 handrails


Stairs (FIM):  5


#of Steps:  4


1 Step (curb) (QC):  5


4 Steps (QC):  5


Stairs:  Pattern:  Step to


Level of Assist:  5


household exception





Exercises


Supine Ex:  Ankle pumps, Quad Set, Rolling, Glut sets, Heel Slides, Short Arc 

Quads, Scooting, Straight leg raise (w assist), Hip abd/add (w assist)


NuStep Minutes:  10


 NuStep Workload:  2





Treatments


pt. all about unit this date for a tour to distract her, laundry room, kitchen 

as well as bathing center





Assessment


Current Status:  Good Progress


pt. moving well, no LOB, edema reduced, all TRFs mod I, diarrhea under control, 

but pt. appears to have significant anxiety and finds new subject to worry 

about when she really has much to be grateful for





PT Short Term Goals


Short Term Goals


Time Frame:  2017


Transfers (B,C,W/C) (FIM):  4


Gait (FIM):  4


Gait Distance Comment:  150'


Gait Level of Assist:  4


Gait Assistive Device:  FWW


Wheelchair Distance:  50'





PT Long Term Goals


Long Term Goals


PT Long Term Goals Time Frame:  2017


Transfers (B,C,W/C) (FIM):  5 (met 17)


Sit to Lying (QC):  4 (met 17)


Lying-Sitting on Side/Bed(QC):  4 (met 17)


Sit to Stand (QC):  4 (met 17)


Rollin


Roll Left to Right (QC):  4 (met 17)


Chair/Bed-to-Chair Xfer(QC):  4


Gait (FIM):  5


Distance:  200'


Walk 10 feet (QC):  4


Walk 10ft-Uneven Surface(QC):  4


Walk 50ft with 2 Turns (QC):  4


Walk 150 ft (QC):  4


Gait Level of Assist:  5


Gait Assistive Device:  FWW


Stairs (FIM):  2 (met 17)


# of Steps:  4 (met 17)


1 Step (curb) (QC):  4 (met 17)


4 Steps (QC):  4 (met 17)


12 Steps (QC):  88


Stairs Level Of Assist:  4 (met 17)





PT Plan


Treatment/Plan


Treatment Plan:  Continue Plan of Care


Treatment Plan:  Bed Mobility, Education, Functional Activity Ashley, Functional 

Strength, Group Therapy, Gait, Safety, Therapeutic Exercise, Transfers


Treatment Duration:  2017


Frequency:  At least 5-7 days/Wk (IRF)


Estimated Hrs Per Day:  1.5 hours per day


Patient and/or Family Agrees t:  Yes





Safety Risks/Education


Patient Education:  Gait Training, Transfer Techniques, Steps, Correct 

Positioning, Disease Process, Safety Issues


Teaching Recipient:  Patient


Teaching Methods:  Demonstration, Discussion


Response to Teaching:  Verbalize Understanding, Return Demonstration, 

Reinforcement Needed


pt. c/o that nurses let her LEs down too fast and it hurts her RLE. This PTA 

taught pt to let them down herself , this was practiced several times with pt. 

demontr good control





Time/GCodes


Time In:  800


Time Out:  900


Total Billed Treatment Time:  60


Total Billed Treatment


1,EX25m,FA20m,GT15m


G Codes Necessary:  DANIELLA Torres PTA 2017 09:07

## 2017-07-24 NOTE — PM & R (SOAP) PROGRESS NOTE
Subjective


Time Seen by Provider:  18:00


Subjective/Events-last exam


Patient was seen in his room this evening Appreciate DR Carl note and orders

,SW texted me earlier today that discharge to local SNU set for tomorrow 

Patient Modified Independent for transfers


Review of Systems


Cardiovascular:  Edema





Objective


Exam


Last Set of Vital Signs





Vital Signs








  Date Time  Temp Pulse Resp B/P (MAP) Pulse Ox O2 Delivery O2 Flow Rate FiO2


 


7/24/17 09:00      Room Air  


 


7/24/17 05:20 99.3 83 18 147/76 97   





Capillary Refill : Less Than 3 SecondsNONE


I&O











Intake and Output 


 


 7/24/17





 00:00


 


Intake Total 880 ml


 


Balance 880 ml


 


 


 


Intake Oral 880 ml


 


# Voids 9


 


# Bowel Movements 3








General:  Alert, Oriented X3, Cooperative, No Acute Distress


HEENT:  Atraumatic, PERRLA, EOMI, Mucous Memb Moist/Pink


Neck:  Supple, No JVD


Lungs:  Clear to Auscultation


Heart:  Regular Rate


Abdomen:  Normal Bowel Sounds, Soft, No Tenderness


Extremities:  Other (edema 1+ BLES)


Neuro:  Other (guarding rt hip has functional strength distal RT Leg)





Results


Lab


Laboratory Tests


7/24/17 06:45: 


White Blood Count 6.3, Red Blood Count 3.54L, Hemoglobin 10.6#L, Hematocrit 33L

, Mean Corpuscular Volume 93, Mean Corpuscular Hemoglobin 30, Mean Corpuscular 

Hemoglobin Concent 32, Red Cell Distribution Width 16.0H, Platelet Count 593H, 

Mean Platelet Volume 8.5, Neutrophils (%) (Auto) 80H, Lymphocytes (%) (Auto) 15

, Monocytes (%) (Auto) 5, Eosinophils (%) (Auto) 0, Basophils (%) (Auto) 0, 

Neutrophils # (Auto) 5.1, Lymphocytes # (Auto) 0.9L, Monocytes # (Auto) 0.3, 

Eosinophils # (Auto) 0.0, Basophils # (Auto) 0.0, Sodium Level 138, Potassium 

Level 4.2, Chloride Level 106, Carbon Dioxide Level 22, Anion Gap 10, Blood 

Urea Nitrogen 21H, Creatinine 0.81, Estimat Glomerular Filtration Rate > 60, BUN

/Creatinine Ratio 26, Glucose Level 127H, Calcium Level 8.7





Microbiology


7/9/17 Stool Culture - Final, Complete


         Negative for Salmonella...





Assessment/Plan


Assessment


Displaced rt femoral neck frx  s/p Bipolar Billy arthroplasty rt hip DR Goodrich 

7/4/17


Postop anemia


Anemia of blood loss


Chronic diarrhea-associated with Stool +for OB times one-DR Jo following 

and now with


FUO started on antibiotics empirically now afebrile


Hypoalbuminemia


Peripheral edema-associated with Hypoalbuminemia-nutritional supplement ordered 

and legs elevated as much as possible


Postop DVT prophylaxis on Lovenox Sub CUT


Hypokalemia replaced


Idiopathhic neuropathy


Onychomycosis


Hammer digit syndrome


Hyperkeratosis


Colitis by Colonscopy -IV steroids orederd and Flagyl





Plan


Discharge set for tomorrow to [facility]


See orders


F/U with PCP and Ortho SHIN La MD Jul 24, 2017 18:03

## 2017-07-25 VITALS — SYSTOLIC BLOOD PRESSURE: 148 MMHG | DIASTOLIC BLOOD PRESSURE: 73 MMHG

## 2017-07-25 RX ADMIN — POTASSIUM CHLORIDE SCH MEQ: 1500 TABLET, EXTENDED RELEASE ORAL at 06:05

## 2017-07-25 RX ADMIN — LOPERAMIDE HYDROCHLORIDE SCH MG: 2 CAPSULE ORAL at 09:44

## 2017-07-25 RX ADMIN — ANORECTAL OINTMENT SCH APPLIC: 15.7; .44; 24; 20.6 OINTMENT TOPICAL at 09:44

## 2017-07-25 RX ADMIN — METRONIDAZOLE SCH MLS/HR: 5 INJECTION, SOLUTION INTRAVENOUS at 01:53

## 2017-07-25 RX ADMIN — LACTOBACILLUS TAB SCH TAB.CHEW: TAB at 06:05

## 2017-07-25 RX ADMIN — METHYLPREDNISOLONE SODIUM SUCCINATE SCH MG: 40 INJECTION, POWDER, FOR SOLUTION INTRAMUSCULAR; INTRAVENOUS at 09:43

## 2017-07-25 RX ADMIN — HYDROCORTISONE SCH APPLIC: 25 CREAM TOPICAL at 09:44

## 2017-07-25 RX ADMIN — HYDROCODONE BITARTRATE AND ACETAMINOPHEN PRN TAB: 5; 325 TABLET ORAL at 02:21

## 2017-07-25 RX ADMIN — Medication SCH OZ: at 09:00

## 2017-07-25 RX ADMIN — ENOXAPARIN SODIUM SCH MG: 100 INJECTION SUBCUTANEOUS at 09:43

## 2017-07-25 NOTE — THERAPY TEAM DISCHARGE SUMMARY
Therapy Discharge Summary


Discharge Recommendations


Date of Discharge


Jul 25, 2017 at 11:00


Therapy D/C Recommendations:  Physical Therapy Home Care





Physical Therapy


Patient came to rehab following a right DUKE.  Upon evaluation patient performs 

bed mobility with min assist except for supine <-> sit which is max assist, she 

performs sit to stand with min assist and stand pivot transfer with CGA, 

ambulated 100' with a rolling walker with CGA, she propelled a manual 

wheelchair 50' with SBA, and she could go up and down 1 step using a rolling 

walker with min assist.  Patient has been performing bed mobility and transfer 

training, balance and endurance training, functional strengthening, stair 

training, gait training, and education.  Patient has made fair progress but has 

not met all of her long term goals.  She has met her bed mobility,stair, and 

transfer goals but not her transfer and ambulation long term goals.  Now, 

patient performs bed mobility and transfers with mod I, ambulates 150' with a 

rolling walker with SBA (including 50' with at least 2 turns of 90 degrees and 

10' over an uneven surface), and can go up and down 4 steps using 2 handrails 

with SBA.  Patient was discharged from this facility today and will be 

discharged from PT at this time.





PT Long Term Goals


Long Term Goals


PT Long Term Goals Time Frame:  Jul 28, 2017


Transfers (B,C,W/C) (FIM):  5 (met 7/11/17)


Roll Left to Right (QC):  4 (met 7/11/17)


Sit to Lying (QC):  4 (met 7/11/17)


Lying-Sitting on Side/Bed(QC):  4 (met 7/11/17)


Sit to Stand (QC):  4 (met 7/11/17)


Chair/Bed-to-Chair Xfer(QC):  4


Gait (FIM):  5


Distance:  200'


Walk 10 feet (QC):  4


Walk 10ft-Uneven Surface(QC):  4


Walk 50ft with 2 Turns (QC):  4


Walk 150 ft (QC):  4


Gait Level of Assist:  5


Gait Assistive Device:  FWW


Stairs (FIM):  2 (met 7/11/17)


# of Steps:  4 (met 7/11/17)


1 Step (curb) (QC):  4 (met 7/11/17)


4 Steps (QC):  4 (met 7/11/17)


12 Steps (QC):  88


Stairs Level Of Assist:  4 (met 7/11/17)





OT Long Term Goals


Long Term Goals


Time Frame:  Jul 28, 2017


Eating (FIM):  6 (not met)


Eating (QC):  6 (5-not met)


Oral Hygiene (QC):  6 (4-not met)


Grooming(FIM):  6 (5-not met)


Bathing(FIM):  5 (not met)


Shower/Bathe Self (QC):  5 (4-not met)


Upper Body Dressing(FIM):  5 (met 7/24/17)


Upper Body Dressing (QC):  5 (5-MET)


Lower Body Dressing(FIM):  5 (not met)


Lower Body Dressing (QC):  5 (3-not met)


On/Off Footwear (QC):  5 (3-not met)


Toileting(FIM):  6 (not met)


Toileting Hygiene (QC):  6 (4-not met)


Toilet/Commode Transfer(FIM):  6 (5-not met)


Toilet/Commode Transfer (QC):  6 (4-not met)


Shower Transfer(FIM):  5 (met 7/24/17)


Additional Goals:  1-Demonstrate ADL Tasks, 2-Verbalize Understanding, 3-

ImproveStrength/Ashley


1=Demonstrate adherence to instructed precautions during ADL tasks.


2=Patient will verbalize/demonstrate understanding of assistive devices/

modifications for ADL.


3=Patient will improve strength/tolerance for activity to enable patient to 

perform ADL's.











YESSICA ESPANA PT Jul 25, 2017 14:41

## 2017-07-25 NOTE — PROGRESS NOTE-HOSPITALIST
Subjective


HPI/CC On Admission


Date Seen by Provider:  Jul 25, 2017


Time Seen by Provider:  08:00


Mrs. Campbell and used to have diarrhea with poor appetite.  She's had some low-

grade temperature.  She denies abdominal pain or distention.  She continues to 

have lower extremity swelling unchanged and has not seen any blood in her 

stool.  With [place] diarrhea has diminished a little but still averaging 7-8 

loose to semi-formed stools.  She has been anxious about the thought of 

returning home.  Currently she would not be able to care for herself due to 

diarrhea and fear of accidents.  This in addition to continued fatigue with 

poor appetite.  She is sided appropriately to look into skilled nursing care 

when discharged from acute rehabilitation.


Subjective/Events-last exam


other than generalized anxiety aggravated by her impending transfer Mrs. Larry is doing well now.  Diarrhea has resolved and she denies abdominal 

pain.  There've been no chills or fever.





Objective


Exam


Vital Signs





Vital Sign - Last 12Hours








 7/19/17





 05:04


 


Temp 98.7


 


Pulse 91


 


Resp 18


 


B/P (MAP) 128/70


 


Pulse Ox 96


 


O2 Delivery Room Air





Capillary Refill : Less Than 3 SecondsNONE


General Appearance:  No Apparent Distress, Anxious, Chronically ill


Respiratory:  Lungs Clear, Normal Breath Sounds, No Accessory Muscle Use, No 

Respiratory Distress


Gastrointestinal:  Normal Bowel Sounds, No Organomegaly, No Pulsatile Mass, Non 

Tender, Soft


Extremity:  Other (2-3+ edema of the right lower extremity 1-2+ of the left.  

There is no erythema noted.)





Assessment/Plan


Assessment and Plan


Assess & Plan/Chief Complaint


1. reported now nearly 2 month history of diarrheal illness culture negative 

and C. difficile negative on 2 occasions with colonoscopy findings concerning 

for ulcerative colitis.  The demarcation change for inflammation was noted in 

the proximal ascending colon.  Biopsies of the proximal ascending colon were 

reportedly normal.  While active colitis was reported on more distended 

biopsies and obvious on visual inspection more classic histopathologic findings 

for ulcerative colitis were not reported.  For now will continue prednisone 

taper.  If the patient has recurrence of diarrhea off of prednisone would 

repeat culture for enteric pathogens but reinitiate prednisone and add 

sulfasalazine with GI referral to follow for likely ongoing need for treatment 

for ulcerative colitis.  The patient did finish up a 1 week course of Flagyl 

and levofloxacin.  With resolution of her symptoms will not be discharged on 

antibiotic therapy.  She was discharged on prednisone 20 mg twice a day 

tapering 10 mg weekly tell off.


2.  Generalized anxiety aggravating number 1.  Discussed and strongly 

recommended that treatment.  We'll initiate low-dose  Zoloft 25 mg tonight with 

dose escalation provided the patient has no significant side effects.  

Discussed her baseline lightheadedness was likely all anxiety related as it is 

been going on for some time and actually gets better with activity and 

distraction.


3.  Anemia component of blood loss from the colon as well as chronic disease 

from probable underlying ulcerative colitis much improved up to 10.6. CBC and a 

CMP will be repeated in one week at the nursing home.


4.  Right benign feeling breast mass will need to consider workup in the 

future.  The patient feels it is been present for several years now has any 

change however lending further credence to this being a benign process however.


5.  Status post right total hip replacement for right hip fracture following up 

with Dr. Goodrich in several weeks.





Copy


Copies To 1:   TY MENA MD, MARK D MD Jul 25, 2017 09:45

## 2017-07-25 NOTE — PM & R (SOAP) PROGRESS NOTE
Subjective


Time Seen by Provider:  07:45


Subjective/Events-last exam


Patient was seen in her room this AM All set for discharge today.Todays labs 

noted


Review of Systems


Cardiovascular:  Edema





Objective


Exam


Last Set of Vital Signs





Vital Signs








  Date Time  Temp Pulse Resp B/P (MAP) Pulse Ox O2 Delivery O2 Flow Rate FiO2


 


7/25/17 05:07 98.2 85 18 148/73 95 Room Air  





Capillary Refill : Less Than 3 SecondsNONE


I&O











Intake and Output 


 


 7/25/17





 00:00


 


Intake Total 950 ml


 


Balance 950 ml


 


 


 


Intake Oral 950 ml


 


# Voids 14


 


# Bowel Movements 2








General:  Alert, Oriented X3, Cooperative, No Acute Distress


HEENT:  Atraumatic, PERRLA, EOMI, Mucous Memb Moist/Pink


Neck:  Supple, No JVD


Lungs:  Clear to Auscultation


Heart:  Regular Rate


Abdomen:  Normal Bowel Sounds, Soft, No Tenderness


Extremities:  Other (2+ pitting edema pretibial)


Neuro:  Other (guarding rt hip has functional strength distal RT Leg)





Results


Lab


Laboratory Tests


7/24/17 06:45: 


White Blood Count 6.3, Red Blood Count 3.54L, Hemoglobin 10.6#L, Hematocrit 33L

, Mean Corpuscular Volume 93, Mean Corpuscular Hemoglobin 30, Mean Corpuscular 

Hemoglobin Concent 32, Red Cell Distribution Width 16.0H, Platelet Count 593H, 

Mean Platelet Volume 8.5, Neutrophils (%) (Auto) 80H, Lymphocytes (%) (Auto) 15

, Monocytes (%) (Auto) 5, Eosinophils (%) (Auto) 0, Basophils (%) (Auto) 0, 

Neutrophils # (Auto) 5.1, Lymphocytes # (Auto) 0.9L, Monocytes # (Auto) 0.3, 

Eosinophils # (Auto) 0.0, Basophils # (Auto) 0.0, Sodium Level 138, Potassium 

Level 4.2, Chloride Level 106, Carbon Dioxide Level 22, Anion Gap 10, Blood 

Urea Nitrogen 21H, Creatinine 0.81, Estimat Glomerular Filtration Rate > 60, BUN

/Creatinine Ratio 26, Glucose Level 127H, Calcium Level 8.7





Microbiology


7/9/17 Stool Culture - Final, Complete


         Negative for Salmonella...





Assessment/Plan


Assessment


Displaced rt femoral neck frx  s/p Bipolar Billy arthroplasty rt hip DR Goodrich 

7/4/17


Postop anemia


Anemia of blood loss


Chronic diarrhea-associated with Stool +for OB times one-DR Jo following 

and now with


FUO started on antibiotics empirically now afebrile


Hypoalbuminemia


Peripheral edema-associated with Hypoalbuminemia-nutritional supplement ordered 

and legs elevated as much as possible


Postop DVT prophylaxis on Lovenox Sub CUT


Hypokalemia replaced


Idiopathhic neuropathy


Onychomycosis


Hammer digit syndrome


Hyperkeratosis


Colitis by Colonoscopy -IV steroids ordered and Flagyl





Plan


[facility]


F/U with DR Dean and Joleen


See orders











SHIN DEL VALLE MD Jul 25, 2017 08:01

## 2017-07-26 NOTE — THERAPY TEAM DISCHARGE SUMMARY
Therapy Discharge Summary


Discharge Recommendations


Date of Discharge


Jul 25, 2017 at 11:00


Therapy D/C Recommendations:  Physical Therapy Home Care





Occupational Therapy


Pt admitted to ARU with right DUKE. On admission pt required minimal assistance 

with transfers, max assist with bathing, and total assist with LE dressing and 

toileting. Skilled OT intervention focused on ADL training, transfers, 

strengthening, and safety education. Pt progressed with therapy and by 

discharge is completing eating, grooming, UE dressing, toileting, and transfers 

with SBA and bathing and LE dressing with minimal assistance. Pt met goals for 

UE dressing and shower transfer, but did not meet other goals. Pt discharged to 

[facility] for continued care. D/C ARU OT.





PT Long Term Goals


Long Term Goals


PT Long Term Goals Time Frame:  Jul 28, 2017


Transfers (B,C,W/C) (FIM):  5 (met 7/11/17)


Roll Left to Right (QC):  4 (met 7/11/17)


Sit to Lying (QC):  4 (met 7/11/17)


Lying-Sitting on Side/Bed(QC):  4 (met 7/11/17)


Sit to Stand (QC):  4 (met 7/11/17)


Chair/Bed-to-Chair Xfer(QC):  4


Gait (FIM):  5


Distance:  200'


Walk 10 feet (QC):  4


Walk 10ft-Uneven Surface(QC):  4


Walk 50ft with 2 Turns (QC):  4


Walk 150 ft (QC):  4


Gait Level of Assist:  5


Gait Assistive Device:  FWW


Stairs (FIM):  2 (met 7/11/17)


# of Steps:  4 (met 7/11/17)


1 Step (curb) (QC):  4 (met 7/11/17)


4 Steps (QC):  4 (met 7/11/17)


12 Steps (QC):  88


Stairs Level Of Assist:  4 (met 7/11/17)





OT Long Term Goals


Long Term Goals


Time Frame:  Jul 28, 2017


Eating (FIM):  6 (not met)


Eating (QC):  6 (5-not met)


Oral Hygiene (QC):  6 (4-not met)


Grooming(FIM):  6 (5-not met)


Bathing(FIM):  5 (not met)


Shower/Bathe Self (QC):  5 (4-not met)


Upper Body Dressing(FIM):  5 (met 7/24/17)


Upper Body Dressing (QC):  5 (5-MET)


Lower Body Dressing(FIM):  5 (not met)


Lower Body Dressing (QC):  5 (3-not met)


On/Off Footwear (QC):  5 (3-not met)


Toileting(FIM):  6 (not met)


Toileting Hygiene (QC):  6 (4-not met)


Toilet/Commode Transfer(FIM):  6 (5-not met)


Toilet/Commode Transfer (QC):  6 (4-not met)


Shower Transfer(FIM):  5 (met 7/24/17)


Additional Goals:  1-Demonstrate ADL Tasks, 2-Verbalize Understanding, 3-

ImproveStrength/Ashley


1=Demonstrate adherence to instructed precautions during ADL tasks.


2=Patient will verbalize/demonstrate understanding of assistive devices/

modifications for ADL.


3=Patient will improve strength/tolerance for activity to enable patient to 

perform ADL's.











RADHA HOWARD OT Jul 26, 2017 15:04

## 2017-08-08 NOTE — DISCHARGE SUMMARY
DATE OF SERVICE:  



HISTORY OF PRESENT ILLNESS:

The patient is a 80-year-old female who lives alone, had been independent with a

wheeled walker, who fell at home and sustained a subcapital femoral neck

fracture of the right hip.  The patient was admitted to [facility]

and followed by hospitalist service and orthopedics after being admitted on

07/04/2017 and underwent a bipolar femoral hemiarthroplasty of right hip and

repair chronic gluteus medius tendon avulsion, right hip with Dr. Goodrich.  The

patient was made weightbearing as tolerated postoperatively.  The patient had a

decline in functional independence due to this.  She was referred to inpatient

rehabilitation unit for comprehensive program and orthopedic rehabilitation

prior to discharge home.



PAST MEDICAL HISTORY:

PCP is Dr. Dean.  She reports chronic edema in her ankles for the past two

weeks. I don't believe Dr. Dean has actually seen her yet as a new patient. 

She apparently has not seen a physician in many years.  Also, hyponatremia was

found with a serum sodium of 132.  She had postoperative anemia with a

hemoglobin 10.2.  Also noted was hypoalbuminemia and chronic edema.



MEDICAL COURSE:

The patient was followed by hospitalist service including Dr. Mosquera as well as

Dr. Andrews while on the medical unit.  She continued with diarrhea loose stools

and stools positive for occult blood.  She underwent colonoscopy with Dr. Jo, which revealed pan colitis.  She was placed on steroids for this.  She

completed a course of antibiotics as well.  The patient did not care for support

hose or Ace wraps for her edema.  Elevation was provided with some improvement. 

CBC on 07/24/2017 showed improvement of H and H at 10.6/33 up from 7.6/24 on

07/20/2017.  WBC on 07/24/2017 was 6.3.  Platelet count 593k.  Chemistry on

07/20/2017 showed chloride 109.  Calcium 8.0, replacement provided.  Albumin 2.0

and total protein 5.2.  On 07/24/2017 BUN was 21, otherwise normal electrolytes.

 Blood glucose 127.  Stool for occult blood was positive on 07/12/2017.  The

patient was placed on scheduled medication for her loose stools with

improvement.  Stool for C. diff was negative on 07/09/2017.  She had some nausea

as well, which responded to Reglan.  She was afebrile during her stay.  Pulse on

07/25/2017 was 85, respirations 18, blood pressure 148/73, O2 sat 95% on room

air.  X-ray of the abdomen on 07/10/2017 revealed scoliosis with degenerative

changes in the lumbar spine.  No acute abnormality was seen in the abdomen.  The

patient was seen by Dr. Marroquin, podiatrist, and her hyperkeratotic lesion and

onychomycosis was treated.



REHABILITATION COURSE:

She was assessed by speech therapy upon admission to unit, found to be

cognitively intact and they signed off.  OT notes upon admission, the patient

required min assist for transfers, max assist for bathing and total assist for

lower body dressing and toileting.  The patient progressed with therapy and by

discharge was completing eating, grooming, upper body dressing, toileting and

transfers with standby assist and bathing and lower body dressing with min

assist.  The patient felt that she could not return home alone at this time and

requested to go to [facility] for ongoing skilled care.  

helped facilitate this for the patient.  PT notes upon admission, the patient

performed bed mobility with min assist other than supine to sit, which was max

assist.  The patient performed bed mobility with min to max assist and performed

transfers with contact guard to min assist.  Could ambulate 100 feet with a

wheeled walker with contact guard and could propel a manual wheelchair 50 feet

with standby assist.  Upon discharge, the patient's modified independent for bed

mobility and transfers could ambulate 150 feet with a wheel walker with standby

assistance.



DISCHARGE INSTRUCTIONS:

The patient will have followup with Dr. Dean, PCP and Dr. Goodrich of

orthopedics.  Follow up PT and OT at skilled nursing unit at [facility].  Continue current diet.



DISCHARGE MEDICATIONS:

1.  Tylenol 650 mg p.o. 4 hours p.r.n. pain.  

2.  Lomotil one tablet p.o. q.i.d. p.r.n. diarrhea.  

3.  Proctozone topically b.i.d. to affected area.  

4.  Calmoseptine apply b.i.d. to affected area.  

5.  KCl 8 mEq p.o. daily.  

6.  Prednisone 20 mg p.o. b.i.d. with meals for seven days.  

7.  Zoloft 50 mg p.o. at bedtime.  

8.  Benefiber 6 ounces p.o. daily.



DISCHARGE DIAGNOSES:

1.  Rehabilitation ambulatory dysfunction secondary to right subcapital femoral

neck fracture status post repair by Dr. Goodrich.

2.  Postop anemia due to blood loss, improved.

3.  Hyponatremia, improved.

4.  Hypokalemia, corrected.

5.  Hypoalbuminemia.

6.  Peripheral edema.

7.  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

8.  Malnutrition.

9.  Cerumen impaction right ear.

10.  Hammertoe, left foot.

11.  Onychomycosis.

12.  Idiopathic peripheral neuropathy.

13.  Situational anxiety.

14.  Hyperkeratotic lesion.  



CONDITION AT DISCHARGE:

Improved and stable.  



PROGNOSIS:

Appears good for some continued improvement with ongoing therapies so hopefully

she will be able to return home with home health care or perhaps to an assisted

living facility due to her age and multiple comorbidities.





Job ID: [id number]

DocumentID: [id number]

Dictated Date:  08/08/2017 09:40:41

Transcription Date: 08/08/2017 10:17:44

Dictated By: SHIN ANDREWS MD

## 2017-09-14 ENCOUNTER — HOSPITAL ENCOUNTER (EMERGENCY)
Dept: HOSPITAL 75 - ER | Age: 82
Discharge: HOME | End: 2017-09-14
Payer: MEDICARE

## 2017-09-14 VITALS — DIASTOLIC BLOOD PRESSURE: 69 MMHG | SYSTOLIC BLOOD PRESSURE: 136 MMHG

## 2017-09-14 VITALS — WEIGHT: 112 LBS | BODY MASS INDEX: 18 KG/M2 | HEIGHT: 66 IN

## 2017-09-14 DIAGNOSIS — M54.2: Primary | ICD-10-CM

## 2017-09-14 DIAGNOSIS — Z90.89: ICD-10-CM

## 2017-09-14 DIAGNOSIS — Z87.440: ICD-10-CM

## 2017-09-14 DIAGNOSIS — Z87.81: ICD-10-CM

## 2017-09-14 DIAGNOSIS — Z87.01: ICD-10-CM

## 2017-09-14 LAB
ALBUMIN SERPL-MCNC: 3.7 GM/DL (ref 3.2–4.5)
ALT SERPL-CCNC: 7 U/L (ref 0–55)
ANION GAP SERPL CALC-SCNC: 9 MMOL/L (ref 5–14)
AST SERPL-CCNC: 15 U/L (ref 5–34)
BASOPHILS # BLD AUTO: 0 10^3/UL (ref 0–0.1)
BASOPHILS NFR BLD AUTO: 1 % (ref 0–10)
BILIRUB SERPL-MCNC: 0.4 MG/DL (ref 0.1–1)
BILIRUB UR QL STRIP: NEGATIVE
BUN SERPL-MCNC: 21 MG/DL (ref 7–18)
BUN/CREAT SERPL: 23
CALCIUM SERPL-MCNC: 9.8 MG/DL (ref 8.5–10.1)
CHLORIDE SERPL-SCNC: 107 MMOL/L (ref 98–107)
CO2 SERPL-SCNC: 24 MMOL/L (ref 21–32)
CREAT SERPL-MCNC: 0.93 MG/DL (ref 0.6–1.3)
EOSINOPHIL # BLD AUTO: 0.2 10^3/UL (ref 0–0.3)
EOSINOPHIL NFR BLD AUTO: 4 % (ref 0–10)
ERYTHROCYTE [DISTWIDTH] IN BLOOD BY AUTOMATED COUNT: 16.7 % (ref 10–14.5)
GFR SERPLBLD BASED ON 1.73 SQ M-ARVRAT: 58 ML/MIN
GLUCOSE SERPL-MCNC: 119 MG/DL (ref 70–105)
KETONES UR QL STRIP: NEGATIVE
LEUKOCYTE ESTERASE UR QL STRIP: (no result)
LYMPHOCYTES # BLD AUTO: 0.9 X 10^3 (ref 1–4)
LYMPHOCYTES NFR BLD AUTO: 17 % (ref 12–44)
MCH RBC QN AUTO: 30 PG (ref 25–34)
MCHC RBC AUTO-ENTMCNC: 32 G/DL (ref 32–36)
MCV RBC AUTO: 94 FL (ref 80–99)
MONOCYTES # BLD AUTO: 0.5 X 10^3 (ref 0–1)
MONOCYTES NFR BLD AUTO: 9 % (ref 0–12)
NEUTROPHILS # BLD AUTO: 3.8 X 10^3 (ref 1.8–7.8)
NEUTROPHILS NFR BLD AUTO: 70 % (ref 42–75)
NITRITE UR QL STRIP: NEGATIVE
PH UR STRIP: 6 [PH] (ref 5–9)
PLATELET # BLD: 274 10^3/UL (ref 130–400)
PMV BLD AUTO: 9.2 FL (ref 7.4–10.4)
POTASSIUM SERPL-SCNC: 3.6 MMOL/L (ref 3.6–5)
PROT SERPL-MCNC: 7.2 GM/DL (ref 6.4–8.2)
PROT UR QL STRIP: (no result)
RBC # BLD AUTO: 4 10^6/UL (ref 4.35–5.85)
SODIUM SERPL-SCNC: 140 MMOL/L (ref 135–145)
SP GR UR STRIP: 1.02 (ref 1.02–1.02)
SQUAMOUS #/AREA URNS HPF: (no result) /HPF
TROPONIN I SERPL-MCNC: < 0.3 NG/ML (ref ?–0.3)
UROBILINOGEN UR-MCNC: NORMAL MG/DL
WBC # BLD AUTO: 5.4 10^3/UL (ref 4.3–11)
WBC #/AREA URNS HPF: (no result) /HPF

## 2017-09-14 PROCEDURE — 93005 ELECTROCARDIOGRAM TRACING: CPT

## 2017-09-14 PROCEDURE — 85025 COMPLETE CBC W/AUTO DIFF WBC: CPT

## 2017-09-14 PROCEDURE — 80053 COMPREHEN METABOLIC PANEL: CPT

## 2017-09-14 PROCEDURE — 36415 COLL VENOUS BLD VENIPUNCTURE: CPT

## 2017-09-14 PROCEDURE — 81000 URINALYSIS NONAUTO W/SCOPE: CPT

## 2017-09-14 PROCEDURE — 71010: CPT

## 2017-09-14 PROCEDURE — 84484 ASSAY OF TROPONIN QUANT: CPT

## 2017-09-14 NOTE — DIAGNOSTIC IMAGING REPORT
EXAM: Postoperative radiograph of the chest.



INDICATION: Irregular heart rate.



FINDINGS:  

The lungs demonstrate prominent interstitial markings with

probable element of vascular congestion. The heart size is mildly

enlarged. No effusion or pneumothorax.



The mediastinum and abby appear unremarkable.



IMPRESSION:

Cardiomegaly with mild pulmonary vascular congestion.



Dictated by: 



  Dictated on workstation # [id number]

## 2017-09-14 NOTE — ED GENERAL
General


Chief Complaint:  Cardiac/General Problems


Stated Complaint:  IRR HEART RATE


Nursing Triage Note:  


PT ARRIVED PER EMS, PT DAUGHTER CALLED EMS STATES PT HAS IRREGULAR PULSE AND 

WAS 


SWEATY, PT DENIES ANY CO AT THIS X BUT CHRONIC NECK PAIN, PT HAS IRREGULAR 

PULSE 


RATE 70'S. NO DIAPHORISIS NOTED


Nursing Sepsis Screen:  No Definite Risk


Source of Information:  Patient


Exam Limitations:  No Limitations





History of Present Illness


Time Seen by Provider:  15:06


Initial Comments


The patient is an 82-year-old white female known to me from previous contacts.  

She had fallen in July and fractured her hip.  She had gone to [facility] for extended therapy.  She just returned home this week.  Her daughters 

were interviewing a retired nurse for the possibility of providing some in-home 

care further mother.  She reports that she had been having considerable 

problems with neck pain following an occupational therapy session in which they 

asked her to repeatedly tie her shoes.  She found this to be quite painful and 

was scarcely able to turn her head.  During the nursing interview the patient 

complained of lightheadedness.  The daughters thought she became sweaty.  The 

nurse felt that she was having an irregular heartbeat.  The ambulance was 

summoned and she came to the emergency room.  Upon arriving here an EKG was 

done which showed a normal sinus rhythm without tachycardia or arrhythmia.


Timing/Duration:  1 Hour


Severity:  Mild


Associated Systoms:  Diaphoresis





Allergies and Home Medications


Allergies


Coded Allergies:  


     nystatin (Verified  Allergy, Mild, HIVES, 7/5/17)


     amoxicillin (Verified  Allergy, Unknown, 7/21/17)


     chlorhexidine (Verified  Allergy, Unknown, HIVES, 7/5/17)


     clindamycin (Verified  Allergy, Unknown, 7/21/17)


     sulfamethoxazole (Unverified  Allergy, Unknown, 10/16/14)


     trimethoprim (Unverified  Allergy, Unknown, 10/16/14)





Home Medications


Acetaminophen 650 Mg Tablet.er, 650 MG PO Q4H for 30 Days, #100


   Prescribed by: MANNY SALGADO on 7/25/17 0844


Diphenoxylate HCl/Atropine 1 Each Tablet, 1 EA PO QID PRN for DIARRHEA for 30 

Days, #60


   Prescribed by: SHIN DEL VALLE on 7/24/17 1810


Hydrocortisone 30 Gm Cream.appl, 0 GM TOP BID for 14 Days, #1


   Prescribed by: SHIN DEL VALLE on 7/24/17 1810


Menthol/Lanolin/Calamine/Znox 71 Gm Oint, 0 GM TOP BID for 30 Days, #1


   Prescribed by: SHIN DEL VALLE on 7/24/17 1810


Potassium Chloride 8 Meq Tablet.er, 8 MEQ PO DAILY for 10 Days, #10


   Prescribed by: MANNY SALGADO on 7/25/17 0849


Prednisone 20 Mg Tab, 20 MG PO BID WITH MEALS for 7 Days


   Prescribed by: MANNY SALGADO on 7/25/17 0842


Sertraline HCl 50 Mg Tablet, 50 MG PO HS for 30 Days, #30 Ref 5


   Prescribed by: MANNY SALGADO on 7/25/17 0842


[Guar Gum] 6 OZ POWDER, 0 OZ PO DAILY for 30 Days, #1


   Prescribed by: SHIN DEL VALLE on 7/24/17 1810





Constitutional:  see HPI


EENTM:  no symptoms reported


Respiratory:  no symptoms reported


Cardiovascular:  see HPI


Gastrointestinal:  no symptoms reported


Genitourinary:  no symptoms reported


Musculoskeletal:  neck pain


Skin:  no symptoms reported


Psychiatric/Neurological:  No Symptoms Reported


Hematologic/Lymphatic:  No Symptoms Reported


Immunological/Allergic:  no symptoms reported





Past Medical-Social-Family Hx


Patient Social History


Recent Foreign Travel:  No


Contact w/Someone Who Travel:  No


Recent Infectious Disease Expo:  No


Recent Hopitalizations:  Yes (Rt hip fx post fall)





Seasonal Allergies


Seasonal Allergies:  Yes





Surgeries


History of Surgeries:  Yes (RIGHT KNEE SURGERY (SCOPE)  )


Surgeries:  Adenoidectomy, Breast





Respiratory


History of Respiratory Disorde:  Yes


Respiratory Disorders:  Pneumonia





Cardiovascular


History of Cardiac Disorders:  Yes (dependent edema of legs)


Cardiac Disorders:  Chronic Edema/Swelling





Neurological


History of Neurological Disord:  No





Genitourinary


History of Genitourinary Disor:  No (RECENT UTI, 6/30/17, had no cath 7/4/17)





Gastrointestinal


History of Gastrointestinal Di:  Yes (FISSURE)


Gastrointestinal Disorders:  Hemorrhoids, Chronic Diarrhea





Musculoskeletal


History of Musculoskeletal Dis:  Yes (SPRAINED RT ANKLE 3 YRS. AGO AND WEARS 

BRACE TO RIGHT ANKLE.  KNEE SCOPED)


Musculoskeletal Disorders:  Chronic Back Pain





Endocrine


History of Endocrine Disorders:  No





HEENT


History of HEENT Disorders:  Yes


Hearing Impairment:  Hard of Hearing





Cancer


History of Cancer:  No





Psychosocial


History of Psychiatric Problem:  No





Integumentary


History of Skin or Integumenta:  No





Blood Transfusions


History of Blood Disorders:  No





Family Medical History


Significant Family History:  No Pertinent Family Hx





Physical Exam


Vital Signs





Vital Sign - Last 12Hours








 9/14/17





 13:25


 


Temp 97.9


 


Pulse 80


 


Resp 22


 


B/P (MAP) 137/69


 


Pulse Ox 95





Capillary Refill : Less Than 3 Seconds


General Appearance:  No Apparent Distress, WD/WN


Eyes:  Bilateral Eye Normal Inspection


HEENT:  Normal ENT Inspection


Neck:  Other (pain to passive range of motion.  Pain to anoro flexion)


Respiratory:  Chest Non Tender, Lungs Clear, Normal Breath Sounds, No Accessory 

Muscle Use, No Respiratory Distress


Cardiovascular:  Regular Rate, Rhythm, No Edema, No Gallop, No JVD, No Murmur, 

Normal Peripheral Pulses


Gastrointestinal:  Normal Bowel Sounds, No Organomegaly, No Pulsatile Mass, Non 

Tender, Soft


Back:  Normal Inspection, No CVA Tenderness, No Vertebral Tenderness


Extremity:  Normal Capillary Refill, Normal Inspection, Normal Range of Motion, 

Non Tender, No Calf Tenderness, No Pedal Edema


Neurologic/Psychiatric:  Alert, Oriented x3, No Motor/Sensory Deficits, Normal 

Mood/Affect


Skin:  Normal Color, Warm/Dry


Lymphatic:  No Adenopathy





Progress/Results/Core Measures


Results/Orders


Lab Results





Laboratory Tests








Test


  9/14/17


14:09 9/14/17


14:35 Range/Units


 


 


White Blood Count


  5.4 


  


  4.3-11.0


10^3/uL


 


Red Blood Count


  4.00 L


  


  4.35-5.85


10^6/uL


 


Hemoglobin 11.8   11.5-16.0  G/DL


 


Hematocrit 37   35-52  %


 


Mean Corpuscular Volume 94   80-99  FL


 


Mean Corpuscular Hemoglobin 30   25-34  PG


 


Mean Corpuscular Hemoglobin


Concent 32 


  


  32-36  G/DL


 


 


Red Cell Distribution Width 16.7 H  10.0-14.5  %


 


Platelet Count


  274 


  


  130-400


10^3/uL


 


Mean Platelet Volume 9.2   7.4-10.4  FL


 


Neutrophils (%) (Auto) 70   42-75  %


 


Lymphocytes (%) (Auto) 17   12-44  %


 


Monocytes (%) (Auto) 9   0-12  %


 


Eosinophils (%) (Auto) 4   0-10  %


 


Basophils (%) (Auto) 1   0-10  %


 


Neutrophils # (Auto) 3.8   1.8-7.8  X 10^3


 


Lymphocytes # (Auto) 0.9 L  1.0-4.0  X 10^3


 


Monocytes # (Auto) 0.5   0.0-1.0  X 10^3


 


Eosinophils # (Auto)


  0.2 


  


  0.0-0.3


10^3/uL


 


Basophils # (Auto)


  0.0 


  


  0.0-0.1


10^3/uL


 


Sodium Level 140   135-145  MMOL/L


 


Potassium Level 3.6   3.6-5.0  MMOL/L


 


Chloride Level 107     MMOL/L


 


Carbon Dioxide Level 24   21-32  MMOL/L


 


Anion Gap 9   5-14  MMOL/L


 


Blood Urea Nitrogen 21 H  7-18  MG/DL


 


Creatinine


  0.93 


  


  0.60-1.30


MG/DL


 


Estimat Glomerular Filtration


Rate 58 


  


   


 


 


BUN/Creatinine Ratio 23    


 


Glucose Level 119 H    MG/DL


 


Calcium Level 9.8   8.5-10.1  MG/DL


 


Total Bilirubin 0.4   0.1-1.0  MG/DL


 


Aspartate Amino Transf


(AST/SGOT) 15 


  


  5-34  U/L


 


 


Alanine Aminotransferase


(ALT/SGPT) 7 


  


  0-55  U/L


 


 


Alkaline Phosphatase 108     U/L


 


Troponin I < 0.30   <0.30  NG/ML


 


Total Protein 7.2   6.4-8.2  GM/DL


 


Albumin 3.7   3.2-4.5  GM/DL


 


Urine Color  YELLOW   


 


Urine Clarity  CLEAR   


 


Urine pH  6  5-9  


 


Urine Specific Gravity  1.020  1.016-1.022  


 


Urine Protein  1+ H NEGATIVE  


 


Urine Glucose (UA)  NEGATIVE  NEGATIVE  


 


Urine Ketones  NEGATIVE  NEGATIVE  


 


Urine Nitrite  NEGATIVE  NEGATIVE  


 


Urine Bilirubin  NEGATIVE  NEGATIVE  


 


Urine Urobilinogen  NORMAL  NORMAL  MG/DL


 


Urine Leukocyte Esterase  1+ H NEGATIVE  


 


Urine RBC (Auto)  1+ H NEGATIVE  


 


Urine RBC  RARE   /HPF


 


Urine WBC  0-2   /HPF


 


Urine Squamous Epithelial


Cells 


  0-2 


   /HPF


 


 


Urine Crystals  NONE   /LPF


 


Urine Bacteria  NEGATIVE   /HPF


 


Urine Casts  NONE   /LPF


 


Urine Mucus  NEGATIVE   /LPF


 


Urine Culture Indicated  NO   








My Orders





Orders - VALENTÍN AMATO MD


Cbc With Automated Diff (9/14/17 13:44)


Comprehensive Metabolic Panel (9/14/17 13:44)


Troponin I (9/14/17 13:44)


Ua Culture If Indicated (9/14/17 13:44)


Ekg Tracing (9/14/17 13:44)


Chest 1 View, Ap/Pa Only (9/14/17 )





Vital Signs/I&O





Vital Sign - Last 12Hours








 9/14/17





 13:25


 


Temp 97.9


 


Pulse 80


 


Resp 22


 


B/P (MAP) 137/69


 


Pulse Ox 95














Blood Pressure Mean:  91











Departure


Impression


Impression:  


 Primary Impression:  


 Arthralgia, cervical spine


Disposition:  01 HOME, SELF-CARE


Condition:  Stable/Unchanged





Departure-Patient Inst.


Referrals:  


TY MENA MD (PCP/Family)


Primary Care Physician





Add. Discharge Instructions:  


All discharge instructions reviewed with patient and/or family. Voiced 

understanding.


Use hot packs to neck as tolerated.


Use neck support when sleeping and avoid flexing the chin forward.











VALENTÍN AMATO MD Sep 14, 2017 15:11

## 2017-10-25 NOTE — PHYSICAL THERAPY DAILY NOTE
PT Daily Note-Current


Subjective


Pt. again expresses her frustration that she has not come to any resolution for 

her diarrhea and edema in LEs.  Pt. having difficulty prioritizing, following 

and task and reasoning.  [facility] is fearful 

of even ice chips.  Pt. also concerned because of breakdown on buttocks and 

folds of bottom.  This PTA suggested laying in bed flat on left side with 

pillows for positioning to attempt drainage of LEs and relieve bottom. Pt. just 

cant decide what to do and resists and suggestions but wants answers.





Pain





   Numeric Pain Scale:  4


   Location:  Right


   Location Body Site:  Hip


   Pain Description:  Pressure





Appearance


edema continues LEs up in to trunk





Transfers


              Functional [place] Measure


0=Not Assessed/NA   4=Minimal Assistance


1=Total Assistance   5=Supervision or Setup


2=Maximal Assistance   6=Modified [place]


3=Moderate Assistance   7=Complete IndependenceIRFPAI Quality Coding Scale











6 Independent with activity with or without an assistive device


 


5  Patient requires set up or clean up by helper.  Patient completes activity  

by  themselves


 


4 Supervision or touching assist (CGA). Dawson provide cues , steadying assist


 


3 The helper provides less than half the effort to complete the activity


 


2 The helper provides more than half the effort to complete the activity


 


1 Dependent.  The helper does all the effort to complete an activity 


 


7 Patient refused to complete or attempt activity


 


9 The patient did not perform the activity before the current illness or injury


 


88 Not attempted due to Medical conditions or safety concerns








sup to sit , sit to stand chair ans toilet all SBA





Gait Training


Gait Assistive Device:  FWW


 x2 SBA





Stair Training


 Stair Training: Handrails/:  2 handrails


Stairs (FIM):  5


#of Steps:  4


Stairs:  Pattern:  Step to


Level of Assist:  5


household exception





Treatments


pt. in bathroom needed brief and pad changed but felt she couldnt do the pad 

change because "swelling in her legs" This PTA max assist to doff joana brief 

pulled up to thighs and instructed pt that she can insert pad indep.  which she 

did. Pt did end up sitting EOB to consume a few ice chips then layed in bed on 

left side with 2 pillows between LEs and bed heaflat....to help drain edema and 

off bottom





Assessment


Current Status:  Fair Progress


pts. progress is influenced by her edema, diarrhea, and distraction of concern 

about her health





PT Short Term Goals


Short Term Goals


Time Frame:  2017


Transfers (B,C,W/C) (FIM):  4


Gait (FIM):  4


Gait Distance Comment:  150'


Gait Level of Assist:  4


Gait Assistive Device:  FWW


Wheelchair Distance:  50'





PT Long Term Goals


Long Term Goals


PT Long Term Goals Time Frame:  2017


Transfers (B,C,W/C) (FIM):  5 (met 17)


Sit to Lying (QC):  4 (met 17)


Lying-Sitting on Side/Bed(QC):  4 (met 17)


Sit to Stand (QC):  4 (met 17)


Rollin


Roll Left to Right (QC):  4 (met 17)


Chair/Bed-to-Chair Xfer(QC):  4


Gait (FIM):  5


Distance:  200'


Walk 10 feet (QC):  4


Walk 10ft-Uneven Surface(QC):  4


Walk 50ft with 2 Turns (QC):  4


Walk 150 ft (QC):  4


Gait Level of Assist:  5


Gait Assistive Device:  FWW


Stairs (FIM):  2 (met 17)


# of Steps:  4 (met 17)


1 Step (curb) (QC):  4 (met 17)


4 Steps (QC):  4 (met 17)


12 Steps (QC):  88


Stairs Level Of Assist:  4 (met 17)





PT Plan


Treatment/Plan


Treatment Plan:  Continue Plan of Care


Treatment Plan:  Bed Mobility, Education, Functional Activity Ashley, Functional 

Strength, Group Therapy, Gait, Safety, Therapeutic Exercise, Transfers


Treatment Duration:  2017


Frequency:  At least 5-7 days/Wk (IRF)


Estimated Hrs Per Day:  1.5 hours per day


Patient and/or Family Agrees t:  Yes





Safety Risks/Education


Patient Education:  Gait Training, Transfer Techniques, Steps


Teaching Recipient:  Patient


Teaching Methods:  Demonstration, Discussion


Response to Teaching:  Verbalize Understanding, Return Demonstration, 

Reinforcement Needed





Time/GCodes


Time In:  1115


Time Out:  1145


Total Billed Treatment Time:  30


Total Billed Treatment


1,GT15m,FA15m


G Codes Necessary:  DANIELLA Torres PTA 2017 11:56 Refill - pt is asking for 60 for med below.  He would like printed copy to be picked up when ready.

## 2019-08-09 NOTE — HISTORY AND PHYSICAL
DATE OF SERVICE:  



DATE OF ADMISSION:

08/16/2019.



REFERRING PHYSICIAN:

Dr. Wilbert Dean.



HISTORY OF PRESENT ILLNESS:

The patient is an 84-year-old white male referred for colonoscopy due to weight

loss and diarrhea.  I initially performed colonoscopy on the patient for similar

symptoms and occult positive blood in the stool in July of 2017.  At that time,

she had diffuse colitis with a demarcation line in the distal ascending colon

with endoscopic findings suggesting the possibility of ulcerative colitis. 

Biopsy thought that either an infectious colitis or an adverse drug effect,

particularly NSAIDs were more likely.  The patient did finish course of

prednisone, unknown duration and both she and her daughter did note improvement

in appetite and improvement in diarrheal symptoms.  She had been seeing another

gastroenterologist that she was not happy with and has not had subsequent

colonoscopy, but reports 2 to 5 loose stools with episodes of nocturnal bowel

movements with urgency.  She has a sensation of intermittent abdominal bloating.

 Does have stool urgency with occasional incontinence as I recall with minimal

cramping.  She has had no night sweats, chills or fever.  She has lost another 6

pounds, she and her daughter believe since May of this year.  At the time of her

last colonoscopy in July 2017, during a hospital stay, she weighed between 125

to 130 pounds and currently in our office was down to 97 pounds.  She denies

nonsteroidal medication usage and has not noted any melena or bright red blood

per rectum.  They are not aware of any family history for inflammatory bowel

disease.



PAST MEDICAL HISTORY:

Significant for osteoporosis.



PAST SURGICAL HISTORY:

Significant for right bipolar hip replacement due to a fracture from a fall at

home, not from height.  The patient reports that she has had some blood drawn at

Dr. Emerson's office and had a hydrogen breath test that was reportedly

positive.  She does not recall being given any antibiotics.



SOCIAL HISTORY:

She is  with no past smoking or drinking history.



PHYSICAL EXAMINATION:

GENERAL:  Reveals a thin white female, alert, articulate, did not appear to be

in acute distress.

VITAL SIGNS:  Weight 97 pounds, blood pressure 110/70, heart rate 72 and

regular.

HEENT:  Unremarkable.

NECK:  Revealed no JVD, adenopathy or bruits.

CHEST:  Clear to auscultation.

CARDIOVASCULAR:  Revealed a regular rate and rhythm without murmur, S3 or S4.

ABDOMEN:  Soft, supple without mass, organomegaly or tenderness.

EXTREMITIES:  Reveal no cyanosis, clubbing or edema.



ASSESSMENT:

For further evaluation of diarrhea with weight loss, the patient is being set up

for flexible sigmoidoscopy as she did not feel that she could go through prep. 

I am most concerned again about ulcerative colitis.  An underlying neoplastic

process is unlikely considering there was no evidence for neoplasia on

colonoscopy 2 years ago.  Further recommendations will be pending evaluation. 

The patient did sign a release of information to obtain records from Dr. Emerson's office and reviewed recent blood tests from 07/30/2019 revealing

an unremarkable chemistry panel, CBC and TSH say for mild anemia with a

hemoglobin of 12.5, which was normocytic with an MCV of 91.



I thank you for the referral of this pleasant lady.  In discussion with the

patient and her daughter and answering questions, with review of her electronic

medical record, 30 minutes care time was spent, 25 of it face to face.





Job ID: [id number]

DocumentID: [id number]

Dictated Date:  08/09/2019 12:12:55

Transcription Date: 08/09/2019 12:44:30

Dictated By: MANNY SALGADO MD

MTDD

## 2019-08-14 ENCOUNTER — HOSPITAL ENCOUNTER (OUTPATIENT)
Dept: HOSPITAL 75 - PREOP | Age: 84
End: 2019-08-14
Attending: INTERNAL MEDICINE
Payer: MEDICARE

## 2019-08-14 VITALS — BODY MASS INDEX: 15.59 KG/M2 | WEIGHT: 97 LBS | HEIGHT: 66 IN

## 2019-08-14 DIAGNOSIS — R63.4: ICD-10-CM

## 2019-08-14 DIAGNOSIS — R19.7: Primary | ICD-10-CM

## 2019-08-16 ENCOUNTER — HOSPITAL ENCOUNTER (OUTPATIENT)
Dept: HOSPITAL 75 - ENDO | Age: 84
Discharge: HOME | End: 2019-08-16
Attending: INTERNAL MEDICINE
Payer: MEDICARE

## 2019-08-16 VITALS — WEIGHT: 97 LBS | BODY MASS INDEX: 15.59 KG/M2 | HEIGHT: 66 IN

## 2019-08-16 VITALS — SYSTOLIC BLOOD PRESSURE: 154 MMHG | DIASTOLIC BLOOD PRESSURE: 73 MMHG

## 2019-08-16 VITALS — SYSTOLIC BLOOD PRESSURE: 130 MMHG | DIASTOLIC BLOOD PRESSURE: 76 MMHG

## 2019-08-16 VITALS — SYSTOLIC BLOOD PRESSURE: 123 MMHG | DIASTOLIC BLOOD PRESSURE: 61 MMHG

## 2019-08-16 VITALS — DIASTOLIC BLOOD PRESSURE: 62 MMHG | SYSTOLIC BLOOD PRESSURE: 136 MMHG

## 2019-08-16 VITALS — DIASTOLIC BLOOD PRESSURE: 66 MMHG | SYSTOLIC BLOOD PRESSURE: 146 MMHG

## 2019-08-16 VITALS — SYSTOLIC BLOOD PRESSURE: 156 MMHG | DIASTOLIC BLOOD PRESSURE: 74 MMHG

## 2019-08-16 VITALS — SYSTOLIC BLOOD PRESSURE: 148 MMHG | DIASTOLIC BLOOD PRESSURE: 66 MMHG

## 2019-08-16 DIAGNOSIS — Z88.8: ICD-10-CM

## 2019-08-16 DIAGNOSIS — M81.0: ICD-10-CM

## 2019-08-16 DIAGNOSIS — Z88.2: ICD-10-CM

## 2019-08-16 DIAGNOSIS — Z88.1: ICD-10-CM

## 2019-08-16 DIAGNOSIS — K62.89: Primary | ICD-10-CM

## 2019-08-16 DIAGNOSIS — Z96.641: ICD-10-CM

## 2019-08-16 PROCEDURE — 87324 CLOSTRIDIUM AG IA: CPT

## 2019-08-16 PROCEDURE — 87046 STOOL CULTR AEROBIC BACT EA: CPT

## 2019-08-16 PROCEDURE — 87449 NOS EACH ORGANISM AG IA: CPT

## 2019-08-16 PROCEDURE — 87045 FECES CULTURE AEROBIC BACT: CPT

## 2019-08-16 PROCEDURE — 88305 TISSUE EXAM BY PATHOLOGIST: CPT

## 2019-08-16 NOTE — PRE-OP NOTE & CONSCIOUS SEDAT
Pre-Operative Progress Note


H&P Reviewed


The H&P was reviewed, patient examined and no changes noted.


Date H&P Reviewed:  Aug 16, 2019


Time H&P Reviewed:  09:35





Conscious Sedation Pre-Proced


Time


07:50





ASA Score


3


For ASA 3 and 4: Consider anesthesia and medical clearance. Also, for patients

with a history of failed moderate sedation consider anesthesia.

















Airway 


 


Lungs 


 


Heart 


 


 ASA score


 


 ASA 1: a normal healthy patient


 


 ASA 2:  a patient with a mild systemic disease (mid diabetes, controlled 

hypertension, obesity 


 


 ASA 3:  a patient with a severe systemic disease that limits activity  (angina,

COPD, prior Myocardial infarction)


 


 ASA 4:  a patient with an incapacitating disease that is a constant threat to 

life (CHF, renal failure)


 


 ASA 5:  a moribund patient not expected to survive 24 hrs.  (ruptured aneurysm)


 


 ASA 6:  a declared brain-dead patient whose organs are being harvested.


 


 For emergent operations, add the letter E after the classification











Mallampati Classification


Grade 2





Sedation Plan


Analgesia, Amnesia, Plan communicated to team members, Discussed options with 

patient/fam, Discussed risks with patient/fam


The patient is an appropriate candidate to undergo the planned procedure, 

sedation, and anesthesia.





The patient immediately re-assessed prior to indication.











MANNY SALGADO MD              Aug 16, 2019 09:35

## 2019-08-17 NOTE — OPERATIVE REPORT
DATE OF SERVICE:  



FLEXIBLE SIGMOIDOSCOPY SUMMARY



INDICATION FOR THE PROCEDURE:

The patient is an 84-year-old white female undergoing flexible sigmoidoscopy for

diarrhea, weight loss, and intermittent small volume bright red blood per

rectum.  She has lost a significant amount of weight, over 30 pounds in the last

2 years and did not feel that she was going to be able to tolerate colonoscopy

prep.



The patient was placed in the left lateral decubitus position.  Digital rectal

evaluation was performed.  No abnormalities, no additional inspection of the

anal canal or distal rectal vault.  The colonoscope was inserted into the rectum

and advanced to proximal sigmoid colon.  Stool prevented visualization proximal

to this.  The patient tolerated the procedure well.



FINDINGS:

There was no evidence for internal or external hemorrhoids.  The anal canal was

unremarkable.  Beginning at the dentate line and involving the entire visualized

rectum and sigmoid colon, was evidence for diffuse colitis with loss of usual

vascular markings.  No ulceration was noted and no evidence for pseudomembrane

formation or diverticular disease was noted.  There was also loss of the usual

haustral fold pattern likely due to the underlying colitis.  Biopsies were

obtained and submitted for histopathology.



ASSESSMENT:

Flexible sigmoidoscopy is compatible with underlying colitis.  The fact that the

patient had the same pattern of colitis when I performed a colonoscopy on her

roughly 2 years ago where there was a sharp demarcation line noted at the distal

ascending colon in addition to reported improvement with prednisone is strongly

suspicious for underlying ulcerative colitis. This is despite the pathology 
report at

that time not revealing more typical features for ulcerative colitis.  The

patient was not on any medication that I am aware of that could have likely     
                             contributed to colotis and her stool cultures 
including ova and parasite were negative. 

Considering failure to thrive with weight loss and no underlying infectious

symptoms with recent unremarkable CBC, we have initiated Entocort 9 mg daily in

divided doses and have advised patient to follow up with her primary care

provider, Dr. Dean.  Histopathology is pending at the time of this dictation. 

We discussed treatment expectations and that if improvement was not noted after

2 weeks of the medication, she would likely be discontinued and other diagnoses

entertained.  We discussed need for G.I. referral to discuss further

treatment options for steroid sparing as it is a quicker acting bridging therapy
                              only due to long term side effect risk.



I opted for Entocort as the patient likely has osteoporosis considering her age,

significant weight loss and a history of hip fracture requiring total hip

replacement from a fall from standing position not reportedly from height.





Job ID: [id number]

DocumentID: [id number]

Dictated Date:  08/16/2019 16:49:33

Transcription Date: 08/17/2019 00:47:44

Dictated By: MANNY SALGADO MD

MTDD

## 2020-10-07 ENCOUNTER — HOSPITAL ENCOUNTER (EMERGENCY)
Dept: HOSPITAL 75 - ER | Age: 85
Discharge: HOME | End: 2020-10-07
Payer: MEDICARE

## 2020-10-07 VITALS — HEIGHT: 65.75 IN | BODY MASS INDEX: 17.57 KG/M2 | WEIGHT: 108.03 LBS

## 2020-10-07 VITALS — DIASTOLIC BLOOD PRESSURE: 76 MMHG | SYSTOLIC BLOOD PRESSURE: 141 MMHG
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
## 2020-10-07 DIAGNOSIS — M54.9: ICD-10-CM

## 2020-10-07 DIAGNOSIS — Z88.2: ICD-10-CM

## 2020-10-07 DIAGNOSIS — R40.2410: ICD-10-CM

## 2020-10-07 DIAGNOSIS — S43.004A: ICD-10-CM

## 2020-10-07 DIAGNOSIS — Z23: ICD-10-CM

## 2020-10-07 DIAGNOSIS — S01.81XA: Primary | ICD-10-CM

## 2020-10-07 DIAGNOSIS — Z88.1: ICD-10-CM

## 2020-10-07 DIAGNOSIS — Z88.8: ICD-10-CM

## 2020-10-07 DIAGNOSIS — G89.29: ICD-10-CM

## 2020-10-07 DIAGNOSIS — Z79.891: ICD-10-CM

## 2020-10-07 LAB
ALBUMIN SERPL-MCNC: 3.4 GM/DL (ref 3.2–4.5)
ALP SERPL-CCNC: 88 U/L (ref 40–136)
ALT SERPL-CCNC: 7 U/L (ref 0–55)
BASOPHILS # BLD AUTO: 0.1 10^3/UL (ref 0–0.1)
BASOPHILS NFR BLD AUTO: 0 % (ref 0–10)
BASOPHILS NFR BLD MANUAL: 0 %
BILIRUB SERPL-MCNC: 0.4 MG/DL (ref 0.1–1)
BUN/CREAT SERPL: 23
CALCIUM SERPL-MCNC: 8.8 MG/DL (ref 8.5–10.1)
CHLORIDE SERPL-SCNC: 106 MMOL/L (ref 98–107)
CO2 SERPL-SCNC: 20 MMOL/L (ref 21–32)
CREAT SERPL-MCNC: 0.83 MG/DL (ref 0.6–1.3)
EOSINOPHIL # BLD AUTO: 0 10^3/UL (ref 0–0.3)
EOSINOPHIL NFR BLD AUTO: 0 % (ref 0–10)
EOSINOPHIL NFR BLD MANUAL: 0 %
GFR SERPLBLD BASED ON 1.73 SQ M-ARVRAT: > 60 ML/MIN
GLUCOSE SERPL-MCNC: 167 MG/DL (ref 70–105)
HCT VFR BLD CALC: 38 % (ref 35–52)
HGB BLD-MCNC: 12.3 G/DL (ref 11.5–16)
LYMPHOCYTES # BLD AUTO: 0.8 10^3/UL (ref 1–4)
LYMPHOCYTES NFR BLD AUTO: 5 % (ref 12–44)
MAGNESIUM SERPL-MCNC: 1.9 MG/DL (ref 1.6–2.4)
MANUAL DIFFERENTIAL PERFORMED BLD QL: YES
MCH RBC QN AUTO: 32 PG (ref 25–34)
MCHC RBC AUTO-ENTMCNC: 32 G/DL (ref 32–36)
MCV RBC AUTO: 99 FL (ref 80–99)
MONOCYTES # BLD AUTO: 1.1 10^3/UL (ref 0–1)
MONOCYTES NFR BLD AUTO: 7 % (ref 0–12)
MONOCYTES NFR BLD: 6 %
NEUTROPHILS # BLD AUTO: 13 10^3/UL (ref 1.8–7.8)
NEUTROPHILS NFR BLD AUTO: 86 % (ref 42–75)
NEUTS BAND NFR BLD MANUAL: 66 %
NEUTS BAND NFR BLD: 16 %
PLATELET # BLD: 262 10^3/UL (ref 130–400)
PMV BLD AUTO: 8.8 FL (ref 9–12.2)
POTASSIUM SERPL-SCNC: 4.4 MMOL/L (ref 3.6–5)
PROT SERPL-MCNC: 6.6 GM/DL (ref 6.4–8.2)
RBC MORPH BLD: NORMAL
SODIUM SERPL-SCNC: 138 MMOL/L (ref 135–145)
VARIANT LYMPHS NFR BLD MANUAL: 10 %
VARIANT LYMPHS NFR BLD MANUAL: 2 %
WBC # BLD AUTO: 15.1 10^3/UL (ref 4.3–11)

## 2020-10-07 PROCEDURE — 80053 COMPREHEN METABOLIC PANEL: CPT

## 2020-10-07 PROCEDURE — 83735 ASSAY OF MAGNESIUM: CPT

## 2020-10-07 PROCEDURE — 36415 COLL VENOUS BLD VENIPUNCTURE: CPT

## 2020-10-07 PROCEDURE — 90715 TDAP VACCINE 7 YRS/> IM: CPT

## 2020-10-07 PROCEDURE — 23650 CLTX SHO DSLC W/MNPJ WO ANES: CPT

## 2020-10-07 PROCEDURE — 86141 C-REACTIVE PROTEIN HS: CPT

## 2020-10-07 PROCEDURE — 72125 CT NECK SPINE W/O DYE: CPT

## 2020-10-07 PROCEDURE — 73030 X-RAY EXAM OF SHOULDER: CPT

## 2020-10-07 PROCEDURE — 85007 BL SMEAR W/DIFF WBC COUNT: CPT

## 2020-10-07 PROCEDURE — 85027 COMPLETE CBC AUTOMATED: CPT

## 2020-10-07 PROCEDURE — 71045 X-RAY EXAM CHEST 1 VIEW: CPT

## 2020-10-07 PROCEDURE — 93041 RHYTHM ECG TRACING: CPT

## 2020-10-07 PROCEDURE — 70450 CT HEAD/BRAIN W/O DYE: CPT

## 2020-10-07 NOTE — NUR
PT AWAKE ET TALKING.  DR IN TALKING WITH HER AT THIS TIME.  CMS CHECK TO RIGHT 
UPPER EXT WITHIN NORMAL LIMITS.

## 2020-10-07 NOTE — NUR
LACERATION TO RIGHT EYE LID CLEANED WITH NS ET SURGICAL SCRUB.  SKIN TEAR LEFT 
LOWER ARM  CLEANED ET TRIPPLE ANTIBIOTIIC OINTMENT AND BANDAID APPLIED.

## 2020-10-07 NOTE — NUR
JANAY CARTER RN ASSISTED PT TO DAUGHTERS VEHICLE VIA W/C AND ALL DC INSTRUCTION 
PAPERWORK GIVEN TO PT DAUGHTER AND DC INSTRUCTIONS REVIEWED.

## 2020-10-07 NOTE — DIAGNOSTIC IMAGING REPORT
INDICATION: Fall.



TIME OF EXAM: 02:55 p.m.



COMPARISON: Comparison is made with prior chest from 09/14/2017.



FINDINGS: Right convexity thoracic scoliotic curvature is noted.

Lungs appear to be clear. No infiltrates are detected. There is

no effusion or pneumothorax identified.



IMPRESSION: No acute cardiopulmonary process is detected.



Dictated by: 



  Dictated on workstation # [id number]

## 2020-10-07 NOTE — DIAGNOSTIC IMAGING REPORT
CLINICAL HISTORY: Postreduction. Right shoulder dislocation.



COMPARISON: Right shoulder radiographs performed earlier the same

date.



TECHNIQUE: Two views of the right shoulder.



FINDINGS: Successful postreduction of the right shoulder with

normal alignment of the right shoulder. No evidence of acute

fracture. The included right chest is clear.



IMPRESSION: Successful postreduction of the right shoulder with

normal alignment now present. No evidence of fracture.



Dictated by: 



  Dictated on workstation # [id number]

## 2020-10-07 NOTE — ED FALL/INJURY
General


Chief Complaint:  Conscious Sedation


Stated Complaint:  FALL


Nursing Triage Note:  


ARRIVED VIA EMS FROM HOME VIA EMS.  STATES SHE WAS CLEANING HER FLOOR WHEN SHE 


TRIPPED AND FELL FALLING HEAD FIRST.  DENIES  LOC OR NECK PAIN.  COMPLAINS OF 


RIGHT SHOULDER PAIN.  PT RECIEVED FENTENYL 50MCG IV PTA.


Source:  patient, family, EMS


Exam Limitations:  no limitations





History of Present Illness


Date Seen by Provider:  Oct 7, 2020


Time Seen by Provider:  14:31


Initial Comments


This 85-year-old woman presents to the emergency room via EMS after slipping and

falling on the floor while she was cleaning.  She has a laceration beneath the 

right brow but denies any head or neck pain.  She denies loss of consciousness. 

She has obvious disfigurement of the right shoulder where the focus of her pain 

is.  EMS administered fentanyl 50 g.  She denies any other injuries.  She felt 

normal prior to the fall.  She has been struggling with diarrhea which is a ch

ronic problem from chronic colitis.  She has seen a gastroenterologist in the 

past for this problem.





Allergies and Home Medications


Allergies


Coded Allergies:  


     nystatin (Verified  Allergy, Mild, HIVES, 17)


     Sulfa (Sulfonamide Antibiotics) (Verified  Allergy, Unknown, 19)


     amoxicillin (Verified  Allergy, Unknown, 17)


     balsalazide (Verified  Allergy, Unknown, 10/7/20)


     chlorhexidine (Verified  Allergy, Unknown, HIVES, 17)


     clindamycin (Verified  Allergy, Unknown, 17)


     dicyclomine (Verified  Allergy, Unknown, 19)


     mesalamine (Verified  Allergy, Unknown, 10/7/20)


     sulfamethoxazole (Unverified  Allergy, Unknown, 10/16/14)


     trimethoprim (Unverified  Allergy, Unknown, 10/16/14)





Home Medications


Ascorbic Acid/Collagen Hydr 1 Each Capsule, 1 EACH PO DAILY, (Reported)


Bacillus Coagulans 1 Each Tab.chew, 1 EACH PO DAILY, (Reported)


Budesonide 3 Mg Capdr...er, 3 MG PO PC


   Prescribed by: MANNY SALGADO on 19 1110


Hydrocodone/Acetaminophen 1 Each Tablet, 0.5-1 TAB PO Q4H PRN for PAIN-MODERATE 

(5-7)


   Prescribed by: SYLVIE GEORGE on 10/7/20 1816


Krill/Om3/Dha/Epa/Om6/Lip/Astx 1 Each Capsule, 1 EACH PO DAILY, (Reported)


L.acidoph & Yamila,B.lactis 1 Each Capsule, 1 EACH PO TID


   Prescribed by: SYLVIE GEORGE on 10/7/20 1817


Loperamide HCl 2 Mg Tablet, 2 MG PO TID PRN for DIAPER CHANGE, (Reported)


Loratadine 10 Mg Tablet, 10 MG PO DAILY PRN for ALLERGIES, (Reported)


Loratadine 10 Mg Tablet, 10 MG PO DAILY, (Reported)





Patient Home Medication List


Home Medication List Reviewed:  Yes





Review of Systems


Review of Systems


Constitutional:  no symptoms reported


Eyes:  No Symptoms Reported


Ears, Nose, Mouth, Throat:  no symptoms reported


Respiratory:  no symptoms reported


Cardiovascular:  no symptoms reported


Gastrointestinal:  see HPI


Genitourinary:  no symptoms reported


Pregnant:  No


Musculoskeletal:  see HPI


Skin:  no symptoms reported


Psychiatric/Neurological:  No Symptoms Reported





Past Medical-Social-Family Hx


Past Med/Social Hx:  Reviewed Nursing Past Med/Soc Hx


Patient Social History


2nd Hand Smoke Exposure:  No


Recent Foreign Travel:  No


Contact w/Someone Who Travel:  No


Recent Infectious Disease Expo:  No


Recent Hopitalizations:  No





Seasonal Allergies


Seasonal Allergies:  Yes





Past Medical History


Surgeries:  Yes (RIGHT KNEE SURGERY (SCOPE)  R hip fx)


Adenoidectomy, Breast


Respiratory:  No


Pneumonia


Cardiac:  Yes (dependent edema of legs)


Chronic Edema/Swelling


Neurological:  No


Genitourinary:  No


Gastrointestinal:  Yes (FISSURE)


Colitis (chronic), Hemorrhoids, Chronic Diarrhea


Musculoskeletal:  Yes (SPRAINED RT ANKLE 3 YRS. AGO AND WEARS BRACE TO RIGHT 

ANKLE.  KNEE SCOPED)


Chronic Back Pain


Endocrine:  No


HEENT:  Yes


Hearing Impairment:  Hard of Hearing


Cancer:  No


Psychosocial:  No


Integumentary:  No


Blood Disorders:  No





Family Medical History


No Pertinent Family Hx





Physical Exam


Vital Signs





Vital Signs - First Documented








 10/7/20 10/7/20





 14:30 16:06


 


Temp 37.0 


 


Pulse 64 


 


Resp 16 


 


B/P (MAP) 138/68 (91) 


 


Pulse Ox 95 


 


O2 Delivery Room Air 


 


O2 Flow Rate  2.00





Capillary Refill : Less Than 3 Seconds


Height, Weight, BMI


Height: 5'6.00"


Weight: 97lbs. 0.0oz. [id number]; 17.00 BMI


Method:Stated


General Appearance:  WD/WN, mild distress, thin


HEENT:  PERRL/EOMI, normal ENT inspection, other (shallow laceration beneath the

right brow)


Neck:  non-tender, normal inspection


Cardiovascular:  regular rate, rhythm, no edema, no murmur


Respiratory:  lungs clear, normal breath sounds, no respiratory distress, no 

accessory muscle use


Gastrointestinal:  non tender, soft


Extremities:  no pedal edema, other (obvious disfigurement of the right 

shoulder.  There is bruising of the shoulder and elbow.  No tenderness over the 

elbow.  Distal sensation, capillary refill, and radial pulses intact)


Neurologic/Psychiatric:  CNs II-XII nml as tested, no motor/sensory deficits, 

alert, normal mood/affect, oriented x 3


Skin:  normal color, warm/dry





Lashaun Coma Score


Best Eye Response:  (4) Open Spontaneously


Best Verbal Response:  (5) Oriented


Best Motor Response:  (6) Obeys Commands


Lashaun Total:  15





Procedures/Interventions


Procedure:  DISLCOACTION OF RIGHT SHOULDR


Patient Education:  Explained Benefits, Explained Risks, Pt. Ack. Understanding


Agreement on procedure with pt:  Yes


Breath Sounds per Auscultation:  Clear


Heart Sounds per Auscultation:  Regular


Airway Exam:  Mouth opens >2 fingers


During the first reduction attempt patient was pretreated with fentanyl and 

morphine.  She was then sedated with etomidate 20 mg.  During the second 

reduction attempt she was pretreated with morphine.  Sedation was provided with 

etomidate 20 mg and Versed 2 mg with good results.


Splinting and Joint Reduction :  


   Pre-Proc Neuro Vasc Exam:  normal


   Post-Proc Neuro Vasc Exam:  normal


   Joint Reduction Site:  shoulder (R)


   Pre-Procedure NV Exam:  Yes


   post joint reduction film:  no fracture seen


Progress


Patient was given conscious sedation with fentanyl, morphine, and etomidate.  

Attempts at reduction by this provider assisted by Arturo Ybarra NP were not 

successful.  Dr. Schwab was consulted and presented to the ER.  Patient was 

further sedated with morphine, etomidate, and Versed.  Dr. Schwab was able to 

reduce the shoulder with assistance from Dr. George by forceful inferior and

lateral traction.  Patient recovered well from sedation and reported 

significantly decreased pain.


   Immobilizers:  Medium Shoulder





Progress/Results/Core Measures


Results/Orders


Lab Results





Laboratory Tests








Test


 10/7/20


15:20 Range/Units


 


 


White Blood Count


 15.1 H


 4.3-11.0


10^3/uL


 


Red Blood Count


 3.87 


 3.80-5.11


10^6/uL


 


Hemoglobin 12.3  11.5-16.0  g/dL


 


Hematocrit 38  35-52  %


 


Mean Corpuscular Volume 99  80-99  fL


 


Mean Corpuscular Hemoglobin 32  25-34  pg


 


Mean Corpuscular Hemoglobin


Concent 32 


 32-36  g/dL





 


Red Cell Distribution Width 13.2  10.0-14.5  %


 


Platelet Count


 262 


 130-400


10^3/uL


 


Mean Platelet Volume 8.8 L 9.0-12.2  fL


 


Immature Granulocyte % (Auto) 1   %


 


Neutrophils (%) (Auto) 86 H 42-75  %


 


Lymphocytes (%) (Auto) 5 L 12-44  %


 


Monocytes (%) (Auto) 7  0-12  %


 


Eosinophils (%) (Auto) 0  0-10  %


 


Basophils (%) (Auto) 0  0-10  %


 


Neutrophils # (Auto)


 13.0 H


 1.8-7.8


10^3/uL


 


Lymphocytes # (Auto)


 0.8 L


 1.0-4.0


10^3/uL


 


Monocytes # (Auto)


 1.1 H


 0.0-1.0


10^3/uL


 


Eosinophils # (Auto)


 0.0 


 0.0-0.3


10^3/uL


 


Basophils # (Auto)


 0.1 


 0.0-0.1


10^3/uL


 


Immature Granulocyte # (Auto)


 0.1 


 0.0-0.1


10^3/uL


 


Neutrophils % (Manual) 66   %


 


Lymphocytes % (Manual) 10   %


 


Monocytes % (Manual) 6   %


 


Eosinophils % (Manual) 0   %


 


Basophils % (Manual) 0   %


 


Band Neutrophils 16   %


 


Reactive Lymphocytes 2   %


 


Blood Morphology Comment NORMAL   


 


Sodium Level 138  135-145  MMOL/L


 


Potassium Level 4.4  3.6-5.0  MMOL/L


 


Chloride Level 106    MMOL/L


 


Carbon Dioxide Level 20 L 21-32  MMOL/L


 


Anion Gap 12  5-14  MMOL/L


 


Blood Urea Nitrogen 19 H 7-18  MG/DL


 


Creatinine


 0.83 


 0.60-1.30


MG/DL


 


Estimat Glomerular Filtration


Rate > 60 


  





 


BUN/Creatinine Ratio 23   


 


Glucose Level 167 H   MG/DL


 


Calcium Level 8.8  8.5-10.1  MG/DL


 


Corrected Calcium 9.3  8.5-10.1  MG/DL


 


Magnesium Level 1.9  1.6-2.4  MG/DL


 


Total Bilirubin 0.4  0.1-1.0  MG/DL


 


Aspartate Amino Transf


(AST/SGOT) 16 


 5-34  U/L





 


Alanine Aminotransferase


(ALT/SGPT) 7 


 0-55  U/L





 


Alkaline Phosphatase 88    U/L


 


C-Reactive Protein High


Sensitivity 0.57 H


 0.00-0.50


MG/DL


 


Total Protein 6.6  6.4-8.2  GM/DL


 


Albumin 3.4  3.2-4.5  GM/DL








My Orders





Orders - SYLVIE VARGAS MD


Cbc With Automated Diff (10/7/20 14:42)


Comprehensive Metabolic Panel (10/7/20 14:42)


Hs C Reactive Protein (10/7/20 14:42)


Ua Culture If Indicated (10/7/20 14:42)


Chest 1 View, Ap/Pa Only (10/7/20 14:42)


Pelvis With Right Hip 2-3views (10/7/20 14:42)


Magnesium (10/7/20 14:42)


Ct Head/Cervical Spine Wo (10/7/20 14:44)


Dipht,Pertuss(Acell),Tet Adult (Boostrix (10/7/20 14:45)


Shoulder, Right, 3 Views (10/7/20 14:42)


Fentanyl  Injection (Sublimaze Injection (10/7/20 15:30)


Lidocaine 1% Inj 20 Ml (Xylocaine 1% Inj (10/7/20 15:30)


Manual Differential (10/7/20 15:20)


Morphine  Injection (Morphine  Injection (10/7/20 15:52)


Morphine  Injection (Morphine  Injection (10/7/20 15:48)


Etomidate Injection (Amidate Injection) (10/7/20 15:54)


Etomidate Injection (Amidate Injection) (10/7/20 16:00)


Morphine  Injection (Morphine  Injection (10/7/20 17:03)


Orphenadrine Inj (Ed Only) (Norflex Inje (10/7/20 17:15)


Midazolam Injection (Versed Injection) (10/7/20 17:15)


Etomidate Injection (Amidate Injection) (10/7/20 17:10)


Shoulder, Right, 2 Views (10/7/20 17:25)





Medications Given in ED





Current Medications








 Medications  Dose


 Ordered  Sig/Tana


 Route  Start Time


 Stop Time Status Last Admin


Dose Admin


 


 Diphtheria/


 Tetanus/Acell


 Pertussis  0.5 ml  ONCE ONCE


 IM  10/7/20 14:45


 10/7/20 14:46 DC 10/7/20 15:22


0.5 ML


 


 Etomidate  10 mg  ONCE  ONCE


 IV  10/7/20 16:00


 10/7/20 16:02 DC 10/7/20 16:03


10 MG


 


 Fentanyl Citrate  50 mcg  ONCE  ONCE


 IVP  10/7/20 15:30


 10/7/20 15:31 DC 10/7/20 15:45


50 MCG


 


 Lidocaine HCl  20 ml  ONCE  ONCE


 INJ  10/7/20 15:30


 10/7/20 15:31 DC 10/7/20 15:45


20 ML


 


 Midazolam HCl  2 mg  ONCE  ONCE


 IVP  10/7/20 17:15


 10/7/20 17:16 DC 10/7/20 17:14


2 MG








Vital Signs/I&O











 10/7/20 10/7/20 10/7/20





 14:30 16:06 17:18


 


Temp 37.0 36.8 


 


Pulse 64 56 


 


Resp 16 6 


 


B/P (MAP) 138/68 (91) 141/76 


 


Pulse Ox 95 95 


 


O2 Delivery Room Air Nasal Cannula 


 


O2 Flow Rate  2.00 2.00














Blood Pressure Mean:                    91











Progress


Progress Note :  


Progress Note


Wound above the eye was cleaned and dressed with antibiotic ointment.  x-rays 

confirmed dislocated shoulder without fracture.  The first attempt at reduction 

by this provider was not successful.  Dr. Schwab was consulted and was able to 

reduce the shoulder.  Patient was ultimately discharged home.  I discussed the 

situation with her daughter.  I advised family to provide significant support at

home.  Admission with placement to rehabilitation or skilled nursing was 

considered, but this was felt to be a poor choice due to potential for exposure 

to corona virus in the current hospital population.  Her chronic diarrhea and 

colitis was addressed.  Patient has received care from a gastroenterologist in 

the past.  This is been an ongoing problem for a couple of years.  I'm 

recommending starting probiotics and close follow-up with the primary care team 

and gastroenterologist.





Diagnostic Imaging





   Diagonstic Imaging:  CT


   Plain Films/CT/US/NM/MRI:  c-spine, head


Comments


CT head and C-spine viewed by me and report reviewed.  See report below:





NAME:   VIANEY CHAMBERLAIN


[facility] REC#:   [id number]


ACCOUNT#:   [id number]


PT STATUS:   REG ER


:   1935


PHYSICIAN:   SYLVIE VARGAS MD


ADMIT DATE:   10/07/20/ER


***Draft***


Date of Exam:10/07/20





CT HEAD/CERVICAL SPINE WO








EXAMINATION: CT head and CT cervical spine without contrast.





TECHNIQUE: Multiple contiguous axial images were obtained through


the brain and cervical spine without the use of intravenous


contrast. Sagittal and coronal reformations through the cervical


spine were then performed. All CT scans use one or more of the


following dose optimizing techniques: automated exposure control,


MA and/or KvP adjustment based on patient size and exam type or


iterative reconstruction. 





HISTORY: Trauma.





COMPARISON: None available.





FINDINGS: 





CT head: Mild diffuse cerebral volume loss with proportional


enlargement of the ventricles and sulci. Mild hypodensities


throughout the supratentorial white matter of both cerebral


hemispheres. No acute intracranial hemorrhage or abnormal


extra-axial fluid collections are present. 





Calcification of the intracranial ICAs. No hyperdense vessel.





The calvarium is intact. The mastoid air cells are clear. The


visualized paranasal sinuses are clear. The orbits are normal.





CT cervical spine: There is mild reversal of the normal cervical


lordosis. Grade 1 anterolisthesis of C3 on C4 and C4 on C5. Grade


1 retrolisthesis of C5 on C6. Loss of disc height at C4-C5 and


C5-C6. Vertebral body height are preserved. No acute fracture,


dislocation or destructive osseous process. Multilevel facet


hypertrophy. 





Moderate central canal stenosis and severe bilateral


neuroforaminal stenosis at C5-C6. Additional levels of mild


central canal and foraminal stenosis.





The paraspinous soft tissues are normal. The visualized thyroid


gland is normal. The visualized lung apices are normal.





IMPRESSION:


1. No acute intracranial abnormality.


2. Degenerative changes of the cervical spine without acute


osseous abnormality.


3. Mild chronic senescent changes.





  Dictated on workstation # [id number]








Dict:   10/07/20 1744


Trans:   10/07/20 1802


E [id number]





Interpreted by:     MAGGIE WALLACE DO








   Diagonstic Imaging:  Xray


   Plain Films/CT/US/NM/MRI:  pelvis, hip


Comments


Hip and pelvis x-ray viewed by me and report reviewed.  See report below:





NAME:   VIANEY CHAMBERLAIN


[facility] REC#:   [id number]


ACCOUNT#:   [id number]


PT STATUS:   REG ER


:   1935


PHYSICIAN:   SYLVIE VARGAS MD


ADMIT DATE:   10/07/20/ER


***Signed***


Date of Exam:10/07/20





PELVIS WITH RIGHT HIP 2-3VIEWS








INDICATION: Fall.





TIME OF EXAM: 2:58 p.m.





EXAMINATION: An AP view of the pelvis and two views of the right


hip were obtained. 





FINDINGS: Postop changes of right hip arthroplasty. Prosthetic


elements appear to be in good position. There is generalized


demineralization of the pelvis. Left hip is intact. The rami


appear intact. No fracture is seen.





IMPRESSION: No acute bony abnormality is detected. 





Dictated by: 





  Dictated on workstation # [id number]








Dict:   10/07/20 1523


Trans:   10/07/20 1558


PJE [id number]





Interpreted by:     AARON SINCLAIR MD


Electronically signed by: AARON SINCLAIR MD 10/07/20 1558








   Diagonstic Imaging:  Xray


   Plain Films/CT/US/NM/MRI:  other (right shoulder)


Comments


Right shoulder x-ray viewed by me and report reviewed.  See report below:





NAME:   VIANEY CHAMBERLAIN


[facility] REC#:   [id number]


ACCOUNT#:   [id number]


PT STATUS:   REG ER


:   1935


PHYSICIAN:   SYLVIE VARGAS MD


ADMIT DATE:   10/07/20/ER


***Signed***


Date of Exam:10/07/20





SHOULDER, RIGHT, 3 VIEWS





INDICATION: 


Injury with pain. 





FINDINGS: 


There is complete anterior subcoracoid glenohumeral dislocation.


No identifiable fracture deformity but post reduction radiographs


are recommended. 





IMPRESSION: 


Complete anterior subcoracoid glenohumeral dislocation.





Dictated by: 





  Dictated on workstation # [id number]





Dict:   10/07/20 1524


Trans:   10/07/20 1636


 [id number]





Interpreted by:     SANTANA JERNIGAN


Electronically signed by: SANTANA JERNIGAN 10/07/20 1636








   Diagonstic Imaging:  Xray


   Plain Films/CT/US/NM/MRI:  chest


Comments


Chest x-ray viewed by me and report reviewed.  See report below:





NAME:   VIANEY CHAMBERLAIN


[facility] REC#:   [id number]


ACCOUNT#:   [id number]


PT STATUS:   REG ER


:   1935


PHYSICIAN:   SYLVIE VARGAS MD


ADMIT DATE:   10/07/20/ER


***Signed***


Date of Exam:10/07/20





CHEST 1 VIEW, AP/PA ONLY








INDICATION: Fall.





TIME OF EXAM: 02:55 p.m.





COMPARISON: Comparison is made with prior chest from 2017.





FINDINGS: Right convexity thoracic scoliotic curvature is noted.


Lungs appear to be clear. No infiltrates are detected. There is


no effusion or pneumothorax identified.





IMPRESSION: No acute cardiopulmonary process is detected.





Dictated by: 





  Dictated on workstation # [id number]








Dict:   10/07/20 1522


Trans:   10/07/20 1557


 [id number]





Interpreted by:     AARON SINCLAIR MD


Electronically signed by: AARON SINCLAIR MD 10/07/20 1557








   Diagonstic Imaging:  Xray


   Plain Films/CT/US/NM/MRI:  other (postreduction right shoulder)


Comments


Postreduction right shoulder x-ray viewed by me and report reviewed.  See report

below:





NAME:   VIANEY CHAMBERLAIN


[facility] REC#:   [id number]


ACCOUNT#:   [id number]


PT STATUS:   REG ER


:   1935


PHYSICIAN:   SYLVIE VARGAS MD


ADMIT DATE:   10/07/20/ER


***Signed***


Date of Exam:10/07/20





CT HEAD/CERVICAL SPINE WO





EXAMINATION: CT head and CT cervical spine without contrast.





TECHNIQUE: Multiple contiguous axial images were obtained through


the brain and cervical spine without the use of intravenous


contrast. Sagittal and coronal reformations through the cervical


spine were then performed. All CT scans use one or more of the


following dose optimizing techniques: automated exposure control,


MA and/or KvP adjustment based on patient size and exam type or


iterative reconstruction. 





HISTORY: Trauma.





COMPARISON: None available.





FINDINGS: 





CT head: Mild diffuse cerebral volume loss with proportional


enlargement of the ventricles and sulci. Mild hypodensities


throughout the supratentorial white matter of both cerebral


hemispheres. No acute intracranial hemorrhage or abnormal


extra-axial fluid collections are present. 





Calcification of the intracranial ICAs. No hyperdense vessel.





The calvarium is intact. The mastoid air cells are clear. The


visualized paranasal sinuses are clear. The orbits are normal.





CT cervical spine: There is mild reversal of the normal cervical


lordosis. Grade 1 anterolisthesis of C3 on C4 and C4 on C5. Grade


1 retrolisthesis of C5 on C6. Loss of disc height at C4-C5 and


C5-C6. Vertebral body height are preserved. No acute fracture,


dislocation or destructive osseous process. Multilevel facet


hypertrophy. 





Moderate central canal stenosis and severe bilateral


neuroforaminal stenosis at C5-C6. Additional levels of mild


central canal and foraminal stenosis.





The paraspinous soft tissues are normal. The visualized thyroid


gland is normal. The visualized lung apices are normal.





IMPRESSION:


1. No acute intracranial abnormality.


2. Degenerative changes of the cervical spine without acute


osseous abnormality.


3. Mild chronic senescent changes.





Dictated by: 





  Dictated on workstation # [id number]





Dict:   10/07/20 174


Trans:   10/07/20 1811


[facility] [id number]





Interpreted by:     MAGGIE WALLACE DO


Electronically signed by: MAGGIE WALLACE DO 10/07/20 1811





Departure


Impression





   Primary Impression:  


   Anterior shoulder dislocation


   Qualified Codes:  S43.014A - Anterior dislocation of right humerus, initial 

   encounter


   Additional Impressions:  


   Fall on same level


   Qualified Codes:  W18.30XA - Fall on same level, unspecified, initial 

   encounter


   Facial contusion


   Qualified Codes:  S00.83XA - Contusion of other part of head, initial 

   encounter


   Facial laceration


   Qualified Codes:  S01.81XA - Laceration without foreign body of other part of

   head, initial encounter


Disposition:  01 HOME, SELF-CARE


Condition:  Improved





Departure-Patient Inst.


Referrals:  


SCHWAB,TERRY D MD TAYLOR,JOHN D MD (PCP/Family)


Primary Care Physician


Patient Instructions:  Moderate Sedation in Adults (DC), Shoulder Dislocation





Add. Discharge Instructions:  


Keep the right arm in the sling is much as possible.  When the arm must be out 

of the sling, move it as little as possible to avoid dislocating again.  Follow-

up with Dr. Schwab in 10 days.  Please call his office for an appointment time 

tomorrow.  You may use pain medication as prescribed.


Regarding your colitis, please follow-up with your primary care provider and 

gastroenterologist.  In the meantime, use probiotics as prescribed.


Call or return to care if you have worsening symptoms.


Scripts


L.acidoph & Paracasei,B.lactis (Probiotic) 1 Each Capsule


1 EACH PO TID, #90 CAP


   Prov: SYLVIE VARGAS MD         10/7/20 


Hydrocodone/Acetaminophen (Hydrocodone-Acetamin 5-325 mg) 1 Each Tablet


0.5-1 TAB PO Q4H PRN for PAIN-MODERATE (5-7), #10 TAB


   Prov: SYLVIE VARGAS MD         10/7/20











SYLVIE VARGAS MD         Oct 7, 2020 18:19

## 2020-10-07 NOTE — NUR
-------------------------------------------------------------------------------

           *** Note undone in [facility] - 10/07/20 at 1817 by SAMI ***            

-------------------------------------------------------------------------------

FAMILY UPDATED

## 2020-10-07 NOTE — NUR
DAUGHTER UPDATED THAT SHE WILL PROBABLY BE READY IN ABOUT 30 MINS AND TO  CALL 
AND LET US KNOW WHEN SHE IS HERE.

## 2020-10-07 NOTE — CONSULTATION - ORTHO
Consult - Ortho


Subjective


Date of Exam


10/7/20


Chief Complaint


Anterior inferior dislocation right shoulder


HPI/Events since last exam


Mrs. Larry is an 85-year-old white female who was cleaning her floor and 

slipped on the wet floor injuring her right shoulder.  She is seen in emergency 

room and evaluated and x-rayed noted to have an anterior-inferior dislocation of

the right shoulder.  She denies any previous problem with the right shoulder.  

She's had previous orthopedic surgery including knee arthroscopy and 

hemiarthroplasty right hip.


Dr. Adams attempted reduction under sedation which was unsuccessful using 

different techniques.  He called me to come and evaluate patient attempt another

reduction under sedation.  The patient would like to go home if the reduction is

successful


Medical, Surgical History


Reviewed and no additions or changes


Social History


Reviewed and no additions or changes


Family History


Reviewed and no additions or changes


Review of Systems


Reviewed and no additions or changes


Allergies:  


Coded Allergies:  


     nystatin (Verified  Allergy, Mild, HIVES, 7/5/17)


     Sulfa (Sulfonamide Antibiotics) (Verified  Allergy, Unknown, 8/14/19)


     amoxicillin (Verified  Allergy, Unknown, 7/21/17)


     balsalazide (Verified  Allergy, Unknown, 10/7/20)


     chlorhexidine (Verified  Allergy, Unknown, HIVES, 7/5/17)


     clindamycin (Verified  Allergy, Unknown, 7/21/17)


     dicyclomine (Verified  Allergy, Unknown, 8/14/19)


     mesalamine (Verified  Allergy, Unknown, 10/7/20)


     sulfamethoxazole (Unverified  Allergy, Unknown, 10/16/14)


     trimethoprim (Unverified  Allergy, Unknown, 10/16/14)


Home Meds


Active Scripts


Budesonide (Entocort EC) 3 Mg Capdr...er, 3 MG PO PC for Diarrhea for 30 Days, 

#90 CAP 1 Refill


   Prov:MANNY SALGADO MD         8/16/19


Reported Medications


Ascorbic Acid/Collagen Hydr (Collagen Plus Vit C Capsule) 1 Each Capsule, 1 EACH

PO DAILY, CAP


   8/14/19


Loratadine (Claritin) 10 Mg Tablet, 10 MG PO DAILY, TAB


   8/14/19


Bacillus Coagulans (Probiotic) 1 Each Tab.chew, 1 EACH PO DAILY, TAB


   8/14/19


Krill/Om3/Dha/Epa/Om6/Lip/Astx (Krill Oil 1,000 mg Softgel) 1 Each Capsule, 1 

EACH PO DAILY, CAP


   8/14/19


Loperamide HCl (Imodium A-D) 2 Mg Tablet, 2 MG PO TID PRN for DIAPER CHANGE, TAB


   8/14/19


Loratadine (Claritin) 10 Mg Tablet, 10 MG PO DAILY PRN for ALLERGIES, TAB


   7/5/17





Objective


Exam


Constitutional: []


HEENT: []


Neck: []


Cardiovascular: []


Respiratory: []


Gastrointestinal: []


Genitourinary: []


Skin: []


Back/Spine: []


Extremities: [Obvious deformity right shoulder with anterior-inferior disl

ocation of the glenohumeral joint.  She states she has normal sensation around 

the shoulder including the lateral deltoid region.  She has no pain at the 

elbow.  No pain in the wrist or forearm.  She states she has a little altered 

sensation in the thumb and index finger been normal sensation in the middle, 

ring and fifth finger.  There is bruising in the right upper extremity.]


Neurologic: []


Psychiatric: []


Hematologic/lymphatic/immunologic: []


Vital Signs





Vital Signs








  Date Time  Temp Pulse Resp B/P (MAP) Pulse Ox O2 Delivery O2 Flow Rate FiO2


 


10/7/20 17:18       2.00 


 


10/7/20 16:06 36.8 56 6 141/76 95 Nasal Cannula 2.00 


 


10/7/20 14:30 37.0 64 16 138/68 (91) 95 Room Air  








Lab Results


Laboratory Tests


10/7/20 15:20: 


White Blood Count 15.1H, Red Blood Count 3.87, Hemoglobin 12.3, Hematocrit 38, 

Mean Corpuscular Volume 99, Mean Corpuscular Hemoglobin 32, Mean Corpuscular 

Hemoglobin Concent 32, Red Cell Distribution Width 13.2, Platelet Count 262, 

Mean Platelet Volume 8.8L, Immature Granulocyte % (Auto) 1, Neutrophils (%) 

(Auto) 86H, Lymphocytes (%) (Auto) 5L, Monocytes (%) (Auto) 7, Eosinophils (%) 

(Auto) 0, Basophils (%) (Auto) 0, Neutrophils # (Auto) 13.0H, Lymphocytes # 

(Auto) 0.8L, Monocytes # (Auto) 1.1H, Eosinophils # (Auto) 0.0, Basophils # 

(Auto) 0.1, Immature Granulocyte # (Auto) 0.1, Neutrophils % (Manual) 66, 

Lymphocytes % (Manual) 10, Monocytes % (Manual) 6, Eosinophils % (Manual) 0, 

Basophils % (Manual) 0, Band Neutrophils 16, Reactive Lymphocytes 2, Blood 

Morphology Comment NORMAL, Sodium Level 138, Potassium Level 4.4, Chloride Level

 106, Carbon Dioxide Level 20L, Anion Gap 12, Blood Urea Nitrogen 19H, 

Creatinine 0.83, Estimat Glomerular Filtration Rate > 60, BUN/Creatinine Ratio 

23, Glucose Level 167H, Calcium Level 8.8, Corrected Calcium 9.3, Magnesium 

Level 1.9, Total Bilirubin 0.4, Aspartate Amino Transf (AST/SGOT) 16, Alanine 

Aminotransferase (ALT/SGPT) 7, Alkaline Phosphatase 88, C-Reactive Protein High 

Sensitivity 0.57H, Total Protein 6.6, Albumin 3.4





Imaging


X-rays of the right shoulder show an anterior inferior dislocation right 

shoulder with no evidence of fracture





Assessment and Plan


Assessment


Anterior inferior dislocation right shoulder


Problem List


Unchanged


Plan


Will attempt another closed reduction in the emergency room under conscious 

sedation.  I discussed this with the patient she would like to proceed.  Again 

she would like to go home.  She has a daughter lives in town here that hopefully

 will be able to take care of her as I told her she would be immobilized in a 

sling for several weeks and probably undergo physical therapy after that.  I 

explained to her that with shoulder dislocations are can be some stretch of the 

brachial plexus or nerves around the shoulder and upper arm.  Most likely if 

this is present it should recover.  The numbness and tingling in her index and 

thumb is most likely related to the shoulder dislocation





Procedurepatient was given IV morphine 5 mg, Versed 2 mg and etomidate 10 mg.  

This combination provided very good sedation.  The dislocation was then reduced 

using traction countertraction with the arm abducted about 40.  There is no 

definite reduction but after approximately 30-45 seconds the shoulder was 

checked and it was reduced.  It was brought up in abduction and external 

rotation and there was no instability with anterior and posterior pressure on 

the proximal humerus.  Patient had good radial pulse post reduction.


Postreduction x-rays AP and scapular Y views showed the glenohumeral dislocation

 has been reduced and is stable.





When I left the emergency room after x-rays, the patient was still sedated so I 

I wasn't able to evaluate her sensory exam.  She is placed in a sling with 

abdominal strap and follow-up the office in approximately 10 days for recheck or

 sooner if she has problems





Final Diagonsis


Anterior inferior dislocation right shoulder reduced under conscious sedation


Level of the visit:  Level 3











SCHWAB,TERRY D MD               Oct 7, 2020 17:59

## 2020-10-07 NOTE — DIAGNOSTIC IMAGING REPORT
INDICATION: Fall.



TIME OF EXAM: 2:58 p.m.



EXAMINATION: An AP view of the pelvis and two views of the right

hip were obtained. 



FINDINGS: Postop changes of right hip arthroplasty. Prosthetic

elements appear to be in good position. There is generalized

demineralization of the pelvis. Left hip is intact. The rami

appear intact. No fracture is seen.



IMPRESSION: No acute bony abnormality is detected. 



Dictated by: 



  Dictated on workstation # [id number]

## 2020-10-07 NOTE — DIAGNOSTIC IMAGING REPORT
INDICATION: 

Injury with pain. 



FINDINGS: 

There is complete anterior subcoracoid glenohumeral dislocation.

No identifiable fracture deformity but post reduction radiographs

are recommended. 



IMPRESSION: 

Complete anterior subcoracoid glenohumeral dislocation.



Dictated by: 



  Dictated on workstation # [id number]

## 2020-10-07 NOTE — DIAGNOSTIC IMAGING REPORT
EXAMINATION: CT head and CT cervical spine without contrast.



TECHNIQUE: Multiple contiguous axial images were obtained through

the brain and cervical spine without the use of intravenous

contrast. Sagittal and coronal reformations through the cervical

spine were then performed. All CT scans use one or more of the

following dose optimizing techniques: automated exposure control,

MA and/or KvP adjustment based on patient size and exam type or

iterative reconstruction. 



HISTORY: Trauma.



COMPARISON: None available.



FINDINGS: 



CT head: Mild diffuse cerebral volume loss with proportional

enlargement of the ventricles and sulci. Mild hypodensities

throughout the supratentorial white matter of both cerebral

hemispheres. No acute intracranial hemorrhage or abnormal

extra-axial fluid collections are present. 



Calcification of the intracranial ICAs. No hyperdense vessel.



The calvarium is intact. The mastoid air cells are clear. The

visualized paranasal sinuses are clear. The orbits are normal.



CT cervical spine: There is mild reversal of the normal cervical

lordosis. Grade 1 anterolisthesis of C3 on C4 and C4 on C5. Grade

1 retrolisthesis of C5 on C6. Loss of disc height at C4-C5 and

C5-C6. Vertebral body height are preserved. No acute fracture,

dislocation or destructive osseous process. Multilevel facet

hypertrophy. 



Moderate central canal stenosis and severe bilateral

neuroforaminal stenosis at C5-C6. Additional levels of mild

central canal and foraminal stenosis.



The paraspinous soft tissues are normal. The visualized thyroid

gland is normal. The visualized lung apices are normal.



IMPRESSION:

1. No acute intracranial abnormality.

2. Degenerative changes of the cervical spine without acute

osseous abnormality.

3. Mild chronic senescent changes.



Dictated by: 



  Dictated on workstation # [id number]

## 2020-10-19 ENCOUNTER — HOSPITAL ENCOUNTER (OUTPATIENT)
Dept: HOSPITAL 75 - ORTHO | Age: 85
End: 2020-10-19
Attending: ORTHOPAEDIC SURGERY
Payer: MEDICARE

## 2020-10-19 DIAGNOSIS — S43.084A: Primary | ICD-10-CM
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
## 2020-10-19 PROCEDURE — 73030 X-RAY EXAM OF SHOULDER: CPT

## 2020-10-19 NOTE — DIAGNOSTIC IMAGING REPORT
EXAMINATION: Right shoulder 1037 hours.



INDICATION: Shoulder pain.



2 views were obtained.



FINDINGS: There is no fracture, dislocation or acute bony

abnormality evident. There is mild degenerative disease of the

glenohumeral joint and at least moderate degenerative disease of

the acromioclavicular joint. These findings are similar to the

prior exam of 10/07/2020. The space between the humeral head and

the acromion is narrowed and I suspect that there has been injury

to the rotator cuff. If further imaging is desired, then MRI

would be recommended.



The soft tissues are unremarkable.



IMPRESSION:

1. There is no evidence for an acute bony abnormality.

2. The space between the undersurface of the acromion and the

humeral head is narrowed and most likely rotator cuff has been

injured. Recommendations as above.



Dictated by: 



  Dictated on workstation # [id number]

## 2020-11-02 ENCOUNTER — HOSPITAL ENCOUNTER (OUTPATIENT)
Dept: HOSPITAL 75 - ORTHO | Age: 85
End: 2020-11-02
Attending: ORTHOPAEDIC SURGERY
Payer: MEDICARE
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
## 2020-11-02 DIAGNOSIS — S43.084A: Primary | ICD-10-CM

## 2020-11-02 PROCEDURE — 73030 X-RAY EXAM OF SHOULDER: CPT

## 2020-11-02 NOTE — DIAGNOSTIC IMAGING REPORT
INDICATION: Dislocation.



Two-view shoulder shows no glenohumeral dislocation. Deformity to

the inferior glenoid stable from comparison study of 10.19.  No

acute appearing abnormality.



IMPRESSION:

No dislocation or acute abnormality.



Dictated by: 



  Dictated on workstation # [id number]

## 2020-11-16 ENCOUNTER — HOSPITAL ENCOUNTER (OUTPATIENT)
Dept: HOSPITAL 75 - ORTHO | Age: 85
End: 2020-11-16
Attending: ORTHOPAEDIC SURGERY
Payer: MEDICARE

## 2020-11-16 DIAGNOSIS — S43.084A: Primary | ICD-10-CM
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
## 2020-11-16 PROCEDURE — 73030 X-RAY EXAM OF SHOULDER: CPT

## 2020-11-16 NOTE — DIAGNOSTIC IMAGING REPORT
Indication: Right shoulder dislocation



AP and transscapular Y views of the right shoulder are obtained. 



Comparison is made to study of 11/02/2020.



Study somewhat limited due to positioning on the transscapular

view however no definite dislocation is identified. There may be

mild superior subluxation of the humeral head with respect to

glenoid process compared to previous study. This can be seen with

rotator cuff injury.



IMPRESSION: Stable irregularity and inferior glenoid may be

related to previous dislocation and relocation without persistent

dislocation. Mild elevation of the humeral head with respect to

glenoid process can be due to rotator cuff injury and clinical

correlation would be useful.



Dictated by: 



  Dictated on workstation # [id number]

## 2021-03-17 ENCOUNTER — HOSPITAL ENCOUNTER (EMERGENCY)
Dept: HOSPITAL 75 - ER | Age: 86
Discharge: HOME | End: 2021-03-17
Payer: MEDICARE

## 2021-03-17 VITALS — SYSTOLIC BLOOD PRESSURE: 123 MMHG | DIASTOLIC BLOOD PRESSURE: 64 MMHG

## 2021-03-17 VITALS — WEIGHT: 109.13 LBS | BODY MASS INDEX: 18.18 KG/M2 | HEIGHT: 65 IN

## 2021-03-17 DIAGNOSIS — Z98.890: ICD-10-CM

## 2021-03-17 DIAGNOSIS — Z20.822: ICD-10-CM

## 2021-03-17 DIAGNOSIS — R31.9: ICD-10-CM

## 2021-03-17 DIAGNOSIS — Z79.52: ICD-10-CM

## 2021-03-17 DIAGNOSIS — R60.0: ICD-10-CM

## 2021-03-17 DIAGNOSIS — Z88.1: ICD-10-CM

## 2021-03-17 DIAGNOSIS — Z79.1: ICD-10-CM

## 2021-03-17 DIAGNOSIS — Z88.6: ICD-10-CM

## 2021-03-17 DIAGNOSIS — S92.344A: Primary | ICD-10-CM

## 2021-03-17 DIAGNOSIS — M17.0: ICD-10-CM

## 2021-03-17 DIAGNOSIS — Z87.81: ICD-10-CM

## 2021-03-17 DIAGNOSIS — Z88.3: ICD-10-CM

## 2021-03-17 DIAGNOSIS — K52.9: ICD-10-CM

## 2021-03-17 DIAGNOSIS — M25.471: ICD-10-CM
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
## 2021-03-17 DIAGNOSIS — Z88.8: ICD-10-CM

## 2021-03-17 DIAGNOSIS — Z88.2: ICD-10-CM

## 2021-03-17 LAB
ALBUMIN SERPL-MCNC: 3.6 GM/DL (ref 3.2–4.5)
ALP SERPL-CCNC: 203 U/L (ref 40–136)
ALT SERPL-CCNC: 6 U/L (ref 0–55)
APTT PPP: YELLOW S
BACTERIA #/AREA URNS HPF: (no result) /HPF
BASOPHILS # BLD AUTO: 0 10^3/UL (ref 0–0.1)
BASOPHILS NFR BLD AUTO: 1 % (ref 0–10)
BILIRUB SERPL-MCNC: 0.4 MG/DL (ref 0.1–1)
BILIRUB UR QL STRIP: NEGATIVE
BUN/CREAT SERPL: 23
CALCIUM SERPL-MCNC: 9.4 MG/DL (ref 8.5–10.1)
CHLORIDE SERPL-SCNC: 105 MMOL/L (ref 98–107)
CO2 SERPL-SCNC: 23 MMOL/L (ref 21–32)
CREAT SERPL-MCNC: 0.69 MG/DL (ref 0.6–1.3)
EOSINOPHIL # BLD AUTO: 0.1 10^3/UL (ref 0–0.3)
EOSINOPHIL NFR BLD AUTO: 1 % (ref 0–10)
FIBRINOGEN PPP-MCNC: CLEAR MG/DL
GFR SERPLBLD BASED ON 1.73 SQ M-ARVRAT: > 60 ML/MIN
GLUCOSE SERPL-MCNC: 99 MG/DL (ref 70–105)
GLUCOSE UR STRIP-MCNC: NEGATIVE MG/DL
HCT VFR BLD CALC: 42 % (ref 35–52)
HGB BLD-MCNC: 13.5 G/DL (ref 11.5–16)
KETONES UR QL STRIP: NEGATIVE
LEUKOCYTE ESTERASE UR QL STRIP: (no result)
LYMPHOCYTES # BLD AUTO: 0.8 10^3/UL (ref 1–4)
LYMPHOCYTES NFR BLD AUTO: 12 % (ref 12–44)
MANUAL DIFFERENTIAL PERFORMED BLD QL: NO
MCH RBC QN AUTO: 32 PG (ref 25–34)
MCHC RBC AUTO-ENTMCNC: 32 G/DL (ref 32–36)
MCV RBC AUTO: 98 FL (ref 80–99)
MONOCYTES # BLD AUTO: 0.5 10^3/UL (ref 0–1)
MONOCYTES NFR BLD AUTO: 8 % (ref 0–12)
NEUTROPHILS # BLD AUTO: 4.9 10^3/UL (ref 1.8–7.8)
NEUTROPHILS NFR BLD AUTO: 79 % (ref 42–75)
NITRITE UR QL STRIP: NEGATIVE
PH UR STRIP: 7.5 [PH] (ref 5–9)
PLATELET # BLD: 341 10^3/UL (ref 130–400)
PMV BLD AUTO: 8.8 FL (ref 9–12.2)
POTASSIUM SERPL-SCNC: 4.3 MMOL/L (ref 3.6–5)
PROT SERPL-MCNC: 7.5 GM/DL (ref 6.4–8.2)
PROT UR QL STRIP: NEGATIVE
RBC #/AREA URNS HPF: (no result) /HPF
SODIUM SERPL-SCNC: 139 MMOL/L (ref 135–145)
SP GR UR STRIP: 1.01 (ref 1.02–1.02)
SQUAMOUS #/AREA URNS HPF: (no result) /HPF
WBC # BLD AUTO: 6.2 10^3/UL (ref 4.3–11)

## 2021-03-17 PROCEDURE — 73630 X-RAY EXAM OF FOOT: CPT

## 2021-03-17 PROCEDURE — 87635 SARS-COV-2 COVID-19 AMP PRB: CPT

## 2021-03-17 PROCEDURE — 81000 URINALYSIS NONAUTO W/SCOPE: CPT

## 2021-03-17 PROCEDURE — 80053 COMPREHEN METABOLIC PANEL: CPT

## 2021-03-17 PROCEDURE — 87077 CULTURE AEROBIC IDENTIFY: CPT

## 2021-03-17 PROCEDURE — 87088 URINE BACTERIA CULTURE: CPT

## 2021-03-17 PROCEDURE — 99283 EMERGENCY DEPT VISIT LOW MDM: CPT

## 2021-03-17 PROCEDURE — 36415 COLL VENOUS BLD VENIPUNCTURE: CPT

## 2021-03-17 PROCEDURE — 85025 COMPLETE CBC W/AUTO DIFF WBC: CPT

## 2021-03-17 NOTE — DIAGNOSTIC IMAGING REPORT
HISTORY: Right forefoot pain, injury. Superimposed on chronic

pain.



COMPARISON: None



TECHNIQUE: 3 views of the right foot



FINDINGS:



There is diffuse osteopenia. There is mild deformity at the 4th

metatarsal neck. There are advanced degenerative changes in the

midfoot, particularly the tarsometatarsal joints. There are

degenerative changes throughout the distal interphalangeal joints

and in the 2nd MTP joint. There is a small plantar calcaneal

enthesophyte. There is a small ankle joint effusion.



IMPRESSION:

1. Mild deformity at the 4th metatarsal neck. If pain localizes

to this region, this could represent a mildly impacted fracture.

2. 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

the tarsometatarsal joints.

3. Small ankle joint effusion



Dictated by: 



  Dictated on workstation # [id number]

## 2021-03-17 NOTE — ED LOWER EXTREMITY
General


Chief Complaint:  Lower Extremity


Stated Complaint:  KNEE/FOOT PAIN


Nursing Triage Note:  


TO ED PER EMS FROM HOME. PATIENT REPORTS HAS CHRONIC PIAN IN L KNEE RECEIVED A 


INJECTION  ON MONDAY. LAST NIGHT WAS WALKING AND STARTED HAVING PAIN IN R ANKLE.


Nursing Sepsis Screen:  No Definite Risk


Source:  patient


Exam Limitations:  no limitations





History of Present Illness


Date Seen by Provider:  Mar 17, 2021


Time Seen by Provider:  09:15


Initial Comments


Patient is an 85-year-old female who presents to the emergency department today 

with a chief complaint of right forefoot pain.  Patient states that she stepped 

wrong yesterday and started having pain just proximal to the fourth toe on the 

right foot.  Patient states that it was so painful that she could not bear 

weight on her foot.  Patient lives alone and is quite debilitated.  Patient has 

chronic bilateral knee pain with significant arthritis in her knees.  She 

recently had knee injections per Dr. Anguiano in the left knee about a week ago.  

Patient states that she takes Tylenol over-the-counter 3 times daily.  She 

states this helped a little bit last evening.  She did take some Tylenol this 

morning and had some relief of her symptoms.  Patient has had some swelling in 

her bilateral lower extremities and recent days.  She denies any chest pain, 

shortness of breath.  No nausea, vomiting or diarrhea.  No urinary complaints.





All other review of systems reviewed and negative except as stated above.


Onset:  yesterday


Severity:  moderate


Pain/Injury Location:  right 4th toe


Method of Injury:  twisted


Modifying Factors:  Worse With Movement; Improves With Pain Medication 

(Over-the-counter Tylenol)





Allergies and Home Medications


Allergies


Coded Allergies:  


     nystatin (Verified  Allergy, Mild, HIVES, 17)


     Sulfa (Sulfonamide Antibiotics) (Verified  Allergy, Unknown, 19)


     amoxicillin (Verified  Allergy, Unknown, 17)


     balsalazide (Verified  Allergy, Unknown, 10/7/20)


     chlorhexidine (Verified  Allergy, Unknown, HIVES, 17)


     clindamycin (Verified  Allergy, Unknown, 17)


     dicyclomine (Verified  Allergy, Unknown, 19)


     mesalamine (Verified  Allergy, Unknown, 10/7/20)


     sulfamethoxazole (Unverified  Allergy, Unknown, 10/16/14)


     trimethoprim (Unverified  Allergy, Unknown, 10/16/14)





Home Medications


Ascorbic Acid/Collagen Hydr 1 Each Capsule, 1 EACH PO DAILY, (Reported)


Bacillus Coagulans 1 Each Tab.chew, 1 EACH PO DAILY, (Reported)


Budesonide 3 Mg Capdr...er, 3 MG PO PC


   Prescribed by: MANNY SALGADO on 19 1110


Hydrocodone/Acetaminophen 1 Each Tablet, 0.5-1 TAB PO Q4H PRN for PAIN-MODERATE 

(5-7)


   Prescribed by: SYLVIE MOORE on 10/7/20 181


Krill/Om3/Dha/Epa/Om6/Lip/Astx 1 Each Capsule, 1 EACH PO DAILY, (Reported)


L.acidoph & Paracasei,B.lactis 1 Each Capsule, 1 EACH PO TID


   Prescribed by: SYLVIE MOORE on 10/7/20 1817


Loperamide HCl 2 Mg Tablet, 2 MG PO TID PRN for DIAPER CHANGE, (Reported)


Loratadine 10 Mg Tablet, 10 MG PO DAILY PRN for ALLERGIES, (Reported)


Loratadine 10 Mg Tablet, 10 MG PO DAILY, (Reported)





Patient Home Medication List


Home Medication List Reviewed:  Yes





Review of Systems


Constitutional:  see HPI


EENTM:  no symptoms reported


Respiratory:  no symptoms reported


Cardiovascular:  edema (Bilateral lower extremities)


Gastrointestinal:  no symptoms reported


Genitourinary:  no symptoms reported


Pregnant:  No


Musculoskeletal:  joint pain (Right forefoot base of fourth toe)


Skin:  no symptoms reported





All Other Systems Reviewed


Negative Unless Noted:  Yes





Past Medical-Social-Family Hx


Patient Social History


Alcohol Use:  Denies Use


Smoking Status:  Never a Smoker


2nd Hand Smoke Exposure:  No


Recent Infectious Disease Expo:  No


Recent Hopitalizations:  No





Seasonal Allergies


Seasonal Allergies:  Yes





Past Medical History


Surgeries:  Yes (RIGHT KNEE SURGERY (SCOPE)  R hip fx)


Adenoidectomy, Breast


Respiratory:  No


Pneumonia


Cardiac:  Yes (dependent edema of legs)


Chronic Edema/Swelling


Neurological:  No


Genitourinary:  No


Gastrointestinal:  Yes (FISSURE)


Colitis, Hemorrhoids, Chronic Diarrhea


Musculoskeletal:  Yes (SPRAINED RT ANKLE 3 YRS. AGO AND WEARS BRACE TO RIGHT 

ANKLE.  KNEE SCOPED)


Chronic Back Pain


Endocrine:  No


HEENT:  Yes


Hearing Impairment:  Hard of Hearing


Cancer:  No


Psychosocial:  No


Integumentary:  No


Blood Disorders:  No





Family Medical History


No Pertinent Family Hx





Physical Exam


Vital Signs





Vital Signs - First Documented








 3/17/21





 09:00


 


Temp 36.4


 


Pulse 70


 


Resp 18


 


B/P (MAP) 122/72 (89)


 


O2 Delivery Room Air





Capillary Refill : Less Than 3 Seconds


Height, Weight, BMI


Height: 5'6.00"


Weight: 97lbs. 0.0oz. 43.447405st; 18.00 BMI


Method:Stated


General Appearance:  WD/WN, no apparent distress


HEENT:  PERRL/EOMI


Cardiovascular:  regular rate, rhythm


Respiratory:  lungs clear, normal breath sounds, no respiratory distress, no 

accessory muscle use


Hips:  bilateral hip non-tender, bilateral hip normal inspection, bilateral hip 

normal range of motion, bilateral hip no evidence of injury


Legs:  bilateral leg non-tender, bilateral leg normal inspection (Patient noted 

to have 2+ pitting edema from the knees distally bilaterally), bilateral leg 

normal range of motion, bilateral leg no evidence of injury


Knees:  bilateral knee bone tenderness (Patient has significant crepitance with 

range of motion bilateral knees)


Ankles:  bilateral ankle non-tender, bilateral ankle normal range of motion, 

bilateral ankle no evidence of injury


Feet:  right foot bone tenderness (Distal fourth metatarsal mild overlying 

erythema on the dorsum of the foot)


Neurologic/Tendon:  normal sensation, normal motor functions, normal tendon 

functions


Neurologic/Psychiatric:  alert, normal mood/affect, other (oriented to self and 

location but not to time)


Skin:  normal color, warm/dry





Procedures/Interventions


Patient Education:  Explained Benefits, Explained Risks, Pt. Ack. Understanding


Breath Sounds per Auscultation:  Clear


Heart Sounds per Auscultation:  Regular


Airway Exam:  Mouth opens >2 fingers





Progress/Results/Core Measures


Results/Orders


Lab Results





Laboratory Tests








Test


 3/17/21


10:57 3/17/21


12:35 3/17/21


14:19 Range/Units


 


 


Urine Color YELLOW     


 


Urine Clarity CLEAR     


 


Urine pH 7.5    5-9  


 


Urine Specific Gravity 1.010 L   1.016-1.022  


 


Urine Protein NEGATIVE    NEGATIVE  


 


Urine Glucose (UA) NEGATIVE    NEGATIVE  


 


Urine Ketones NEGATIVE    NEGATIVE  


 


Urine Nitrite NEGATIVE    NEGATIVE  


 


Urine Bilirubin NEGATIVE    NEGATIVE  


 


Urine Urobilinogen 0.2    < = 1.0  MG/DL


 


Urine Leukocyte Esterase 2+ H   NEGATIVE  


 


Urine RBC (Auto) 3+ H   NEGATIVE  


 


Urine RBC TNTC H    /HPF


 


Urine WBC 10-25 H    /HPF


 


Urine Squamous Epithelial


Cells 2-5 


 


 


  /HPF





 


Urine Crystals NONE     /LPF


 


Urine Bacteria FEW H    /HPF


 


Urine Casts NONE     /LPF


 


Urine Mucus NEGATIVE     /LPF


 


Urine Culture Indicated YES     


 


White Blood Count


 


 6.2 


 


 4.3-11.0


10^3/uL


 


Red Blood Count


 


 4.28 


 


 3.80-5.11


10^6/uL


 


Hemoglobin  13.5   11.5-16.0  g/dL


 


Hematocrit  42   35-52  %


 


Mean Corpuscular Volume  98   80-99  fL


 


Mean Corpuscular Hemoglobin  32   25-34  pg


 


Mean Corpuscular Hemoglobin


Concent 


 32 


 


 32-36  g/dL





 


Red Cell Distribution Width  13.8   10.0-14.5  %


 


Platelet Count


 


 341 


 


 130-400


10^3/uL


 


Mean Platelet Volume  8.8 L  9.0-12.2  fL


 


Immature Granulocyte % (Auto)  0    %


 


Neutrophils (%) (Auto)  79 H  42-75  %


 


Lymphocytes (%) (Auto)  12   12-44  %


 


Monocytes (%) (Auto)  8   0-12  %


 


Eosinophils (%) (Auto)  1   0-10  %


 


Basophils (%) (Auto)  1   0-10  %


 


Neutrophils # (Auto)


 


 4.9 


 


 1.8-7.8


10^3/uL


 


Lymphocytes # (Auto)


 


 0.8 L


 


 1.0-4.0


10^3/uL


 


Monocytes # (Auto)


 


 0.5 


 


 0.0-1.0


10^3/uL


 


Eosinophils # (Auto)


 


 0.1 


 


 0.0-0.3


10^3/uL


 


Basophils # (Auto)


 


 0.0 


 


 0.0-0.1


10^3/uL


 


Immature Granulocyte # (Auto)


 


 0.0 


 


 0.0-0.1


10^3/uL


 


Sodium Level  139   135-145  MMOL/L


 


Potassium Level  4.3   3.6-5.0  MMOL/L


 


Chloride Level  105     MMOL/L


 


Carbon Dioxide Level  23   21-32  MMOL/L


 


Anion Gap  11   5-14  MMOL/L


 


Blood Urea Nitrogen  16   7-18  MG/DL


 


Creatinine


 


 0.69 


 


 0.60-1.30


MG/DL


 


Estimat Glomerular Filtration


Rate 


 > 60 


 


  





 


BUN/Creatinine Ratio  23    


 


Glucose Level  99     MG/DL


 


Calcium Level  9.4   8.5-10.1  MG/DL


 


Corrected Calcium  9.7   8.5-10.1  MG/DL


 


Total Bilirubin  0.4   0.1-1.0  MG/DL


 


Aspartate Amino Transf


(AST/SGOT) 


 14 


 


 5-34  U/L





 


Alanine Aminotransferase


(ALT/SGPT) 


 6 


 


 0-55  U/L





 


Alkaline Phosphatase  203 H    U/L


 


Total Protein  7.5   6.4-8.2  GM/DL


 


Albumin  3.6   3.2-4.5  GM/DL


 


Coronavirus 2019 ([facility])   Negative  Negative  








My Orders





Orders - NICOLE TRAVIS MD


Foot, Right, 3 View (3/17/21 09:21)


General/Regular (3/17/21 Lunch)


Cbc With Automated Diff (3/17/21 11:55)


Comprehensive Metabolic Panel (3/17/21 11:55)


Ua Culture If Indicated (3/17/21 11:55)


Urine Culture (3/17/21 10:57)


Covid 19 Inhouse Test (3/17/21 14:14)





Vital Signs/I&O











 3/17/21





 09:00


 


Temp 36.4


 


Pulse 70


 


Resp 18


 


B/P (MAP) 122/72 (89)


 


O2 Delivery Room Air














Blood Pressure Mean:                    89











Progress


Progress Note :  


   Time:  15:49


Progress Note


Patient seen and evaluated in the emergency department today 85-year-old with a 

chief complaint of right forefoot pain.  Evaluation today includes a physical 

exam, x-rays of the right foot, basic laboratory studies including CBC, BMP and 

urinalysis.  Patient is quite debilitated and very apprehensive about returning 

home.  She would like to be discharged to home but states that she has a 

significant amount of pain related to arthritis in her knees and now this pain 

in her foot.  X-rays of the right foot showed that the patient had a distal 

impacted metatarsal head fracture.  This was nondisplaced.  Patient was placed 

in a postop shoe.  Patient's pain has been controlled with over-the-counter 

Tylenol for the last couple of days at home.  Patient is counseled on using the 

shoe at all times when she is ambulating.  She also takes Tylenol for her severe

arthritis.  Patient was found incidentally to have microscopic hematuria on 

urinalysis.  Her renal function is adequate her CBC is normal.  In consultation 

with the patient and her family it was decided that the patient would be best 

served by entering a nursing facility for rehab, physical therapy occupational 

therapy and evaluation to see whether or not she is able to continue to live 

independently.   was used to facilitate placement at [facility].  Patient will be discharged to [facility] today.





It is recommended that the patient have close follow-up with her physician, Dr. Mena, in order to follow-up on the hematuria as well as the metatarsal 

fracture in her right foot.





Diagnostic Imaging





   Diagonstic Imaging:  Xray


   Plain Films/CT/US/NM/MRI:  other (foot)


Comments


                 [facility] [place], [place]





NAME:   VIANEY CHAMBERLAIN


[facility] REC#:   [id number]


ACCOUNT#:   [id number]


PT STATUS:   REG ER


:   1935


PHYSICIAN:   NICOLE TRAVIS MD


ADMIT DATE:   21/ER


                                   ***Draft***


Date of Exam:21





FOOT, RIGHT, 3 VIEW








HISTORY: Right forefoot pain, injury. Superimposed on chronic


pain.





COMPARISON: None





TECHNIQUE: 3 views of the right foot





FINDINGS:





There is diffuse osteopenia. There is mild deformity at the 4th


metatarsal neck. There are advanced degenerative changes in the


midfoot, particularly the tarsometatarsal joints. There are


degenerative changes throughout the distal interphalangeal joints


and in the 2nd MTP joint. There is a small plantar calcaneal


enthesophyte. There is a small ankle joint effusion.





IMPRESSION:


1. Mild deformity at the 4th metatarsal neck. If pain localizes


to this region, this could represent a mildly impacted fracture.


2. 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


the tarsometatarsal joints.


3. Small ankle joint effusion





  Dictated on workstation # [id number]








Dict:   21 1052


Trans:   21 1059


[facility] [id number]





Interpreted by:     GABRIELLE PETERSEN MD


Electronically signed by:





Departure


Impression





   Primary Impression:  


   Closed fracture of fourth metatarsal bone of right foot


   Qualified Codes:  S92.344A - Nondisplaced fracture of fourth metatarsal bone,

   right foot, initial encounter for closed fracture


   Additional Impressions:  


   Hematuria


   Qualified Codes:  R31.9 - Hematuria, unspecified


   Arthritis


Disposition:  01 HOME, SELF-CARE


Condition:  Stable





Departure-Patient Inst.


Decision time for Depature:  15:48


Referrals:  


TY MENA MD (PCP/Family)


Primary Care Physician


Patient Instructions:  Foot Fracture (DC)





Add. Discharge Instructions:  


Please follow-up with Dr. Mena within the next week to 10 days for your foot 

fracture.  You will need to call his office for an appointment.





Please use the walking shoe we have given you at all times when you are walking.





Continue to take your Tylenol 1 tablet every 4-6 hours as needed for pain.





Return to the emergency room for any fevers, abdominal pain, vomiting, increased

pain in your foot or any other emergent concerning symptoms.





Copy


Copies To 1:   TY MENA MD, KATHRYN M MD         Mar 17, 2021 09:32

## 2021-04-17 ENCOUNTER — HOSPITAL ENCOUNTER (INPATIENT)
Dept: HOSPITAL 75 - ER | Age: 86
LOS: 7 days | Discharge: ANOTHER HOSPITAL | DRG: 871 | End: 2021-04-24
Attending: INTERNAL MEDICINE | Admitting: INTERNAL MEDICINE
Payer: MEDICARE

## 2021-04-17 VITALS — SYSTOLIC BLOOD PRESSURE: 131 MMHG | DIASTOLIC BLOOD PRESSURE: 71 MMHG

## 2021-04-17 VITALS — DIASTOLIC BLOOD PRESSURE: 44 MMHG | SYSTOLIC BLOOD PRESSURE: 70 MMHG

## 2021-04-17 VITALS — SYSTOLIC BLOOD PRESSURE: 66 MMHG | DIASTOLIC BLOOD PRESSURE: 49 MMHG

## 2021-04-17 VITALS — SYSTOLIC BLOOD PRESSURE: 105 MMHG | DIASTOLIC BLOOD PRESSURE: 50 MMHG

## 2021-04-17 VITALS — DIASTOLIC BLOOD PRESSURE: 63 MMHG | SYSTOLIC BLOOD PRESSURE: 93 MMHG

## 2021-04-17 VITALS — WEIGHT: 134.26 LBS | HEIGHT: 64.96 IN | BODY MASS INDEX: 22.37 KG/M2

## 2021-04-17 VITALS — SYSTOLIC BLOOD PRESSURE: 89 MMHG | DIASTOLIC BLOOD PRESSURE: 45 MMHG

## 2021-04-17 VITALS — DIASTOLIC BLOOD PRESSURE: 65 MMHG | SYSTOLIC BLOOD PRESSURE: 89 MMHG

## 2021-04-17 DIAGNOSIS — M79.604: ICD-10-CM

## 2021-04-17 DIAGNOSIS — M54.9: ICD-10-CM

## 2021-04-17 DIAGNOSIS — R21: ICD-10-CM

## 2021-04-17 DIAGNOSIS — Z87.01: ICD-10-CM

## 2021-04-17 DIAGNOSIS — Z88.1: ICD-10-CM

## 2021-04-17 DIAGNOSIS — Z88.2: ICD-10-CM

## 2021-04-17 DIAGNOSIS — A41.51: Primary | ICD-10-CM

## 2021-04-17 DIAGNOSIS — S92.301D: ICD-10-CM

## 2021-04-17 DIAGNOSIS — R65.21: ICD-10-CM

## 2021-04-17 DIAGNOSIS — J18.9: ICD-10-CM

## 2021-04-17 DIAGNOSIS — G93.41: ICD-10-CM

## 2021-04-17 DIAGNOSIS — E87.1: ICD-10-CM

## 2021-04-17 DIAGNOSIS — N17.0: ICD-10-CM

## 2021-04-17 DIAGNOSIS — I48.91: ICD-10-CM

## 2021-04-17 DIAGNOSIS — Z79.891: ICD-10-CM

## 2021-04-17 DIAGNOSIS — Z91.81: ICD-10-CM

## 2021-04-17 DIAGNOSIS — E87.2: ICD-10-CM

## 2021-04-17 DIAGNOSIS — E83.42: ICD-10-CM

## 2021-04-17 LAB
ALBUMIN SERPL-MCNC: 3 GM/DL (ref 3.2–4.5)
ALP SERPL-CCNC: 126 U/L (ref 40–136)
ALT SERPL-CCNC: 6 U/L (ref 0–55)
APTT BLD: 30 SEC (ref 24–35)
APTT PPP: YELLOW S
ARTERIAL PATENCY WRIST A: (no result)
BACTERIA #/AREA URNS HPF: (no result) /HPF
BASE EXCESS STD BLDA CALC-SCNC: -5.8 MMOL/L (ref -2.5–2.5)
BASOPHILS # BLD AUTO: 0 10^3/UL (ref 0–0.1)
BASOPHILS NFR BLD AUTO: 0 % (ref 0–10)
BDY SITE: (no result)
BILIRUB SERPL-MCNC: 0.5 MG/DL (ref 0.1–1)
BILIRUB UR QL STRIP: (no result)
BODY TEMPERATURE: 35.8
BUN/CREAT SERPL: 13
CALCIUM SERPL-MCNC: 8.7 MG/DL (ref 8.5–10.1)
CHLORIDE SERPL-SCNC: 95 MMOL/L (ref 98–107)
CO2 BLDA CALC-SCNC: 18.9 MMOL/L (ref 21–31)
CO2 SERPL-SCNC: 18 MMOL/L (ref 21–32)
CREAT SERPL-MCNC: 2.23 MG/DL (ref 0.6–1.3)
EOSINOPHIL # BLD AUTO: 1.6 10^3/UL (ref 0–0.3)
EOSINOPHIL NFR BLD AUTO: 10 % (ref 0–10)
EOSINOPHIL NFR BLD MANUAL: 8 %
FIBRINOGEN PPP-MCNC: (no result) MG/DL
GFR SERPLBLD BASED ON 1.73 SQ M-ARVRAT: 21 ML/MIN
GLUCOSE SERPL-MCNC: 142 MG/DL (ref 70–105)
GLUCOSE UR STRIP-MCNC: NEGATIVE MG/DL
HCT VFR BLD CALC: 42 % (ref 35–52)
HGB BLD-MCNC: 13.8 G/DL (ref 11.5–16)
INHALED O2 FLOW RATE: (no result) L/MIN
INR PPP: 1 (ref 0.8–1.4)
KETONES UR QL STRIP: (no result)
LEUKOCYTE ESTERASE UR QL STRIP: (no result)
LYMPHOCYTES # BLD AUTO: 0.2 10^3/UL (ref 1–4)
LYMPHOCYTES NFR BLD AUTO: 1 % (ref 12–44)
MANUAL DIFFERENTIAL PERFORMED BLD QL: YES
MCH RBC QN AUTO: 31 PG (ref 25–34)
MCHC RBC AUTO-ENTMCNC: 33 G/DL (ref 32–36)
MCV RBC AUTO: 94 FL (ref 80–99)
METAMYELOCYTES NFR BLD: 2 %
MONOCYTES # BLD AUTO: 0.2 10^3/UL (ref 0–1)
MONOCYTES NFR BLD AUTO: 1 % (ref 0–12)
MONOCYTES NFR BLD: 3 %
NEUTROPHILS # BLD AUTO: 14.6 10^3/UL (ref 1.8–7.8)
NEUTROPHILS NFR BLD AUTO: 87 % (ref 42–75)
NEUTS BAND NFR BLD MANUAL: 48 %
NEUTS BAND NFR BLD: 36 %
NITRITE UR QL STRIP: NEGATIVE
PCO2 BLDA: 28 MMHG (ref 35–45)
PH BLDA: 7.42 [PH] (ref 7.37–7.43)
PH UR STRIP: 5 [PH] (ref 5–9)
PLATELET # BLD: 221 10^3/UL (ref 130–400)
PMV BLD AUTO: 9.8 FL (ref 9–12.2)
PO2 BLDA: 84 MMHG (ref 79–93)
POTASSIUM SERPL-SCNC: 4.4 MMOL/L (ref 3.6–5)
PROT SERPL-MCNC: 6 GM/DL (ref 6.4–8.2)
PROT UR QL STRIP: (no result)
PROTHROMBIN TIME: 13.8 SEC (ref 12.2–14.7)
RBC #/AREA URNS HPF: (no result) /HPF
RBC MORPH BLD: NORMAL
SAO2 % BLDA FROM PO2: 97 % (ref 94–100)
SODIUM SERPL-SCNC: 128 MMOL/L (ref 135–145)
SP GR UR STRIP: 1.02 (ref 1.02–1.02)
TOXIC GRANULES BLD QL SMEAR: (no result)
VARIANT LYMPHS NFR BLD MANUAL: 3 %
VENTILATION MODE VENT: NO
WBC # BLD AUTO: 16.8 10^3/UL (ref 4.3–11)
WBC #/AREA URNS HPF: (no result) /HPF

## 2021-04-17 PROCEDURE — 85007 BL SMEAR W/DIFF WBC COUNT: CPT

## 2021-04-17 PROCEDURE — 87077 CULTURE AEROBIC IDENTIFY: CPT

## 2021-04-17 PROCEDURE — 96361 HYDRATE IV INFUSION ADD-ON: CPT

## 2021-04-17 PROCEDURE — 84100 ASSAY OF PHOSPHORUS: CPT

## 2021-04-17 PROCEDURE — 51702 INSERT TEMP BLADDER CATH: CPT

## 2021-04-17 PROCEDURE — 93005 ELECTROCARDIOGRAM TRACING: CPT

## 2021-04-17 PROCEDURE — 80053 COMPREHEN METABOLIC PANEL: CPT

## 2021-04-17 PROCEDURE — 81000 URINALYSIS NONAUTO W/SCOPE: CPT

## 2021-04-17 PROCEDURE — 36600 WITHDRAWAL OF ARTERIAL BLOOD: CPT

## 2021-04-17 PROCEDURE — 85730 THROMBOPLASTIN TIME PARTIAL: CPT

## 2021-04-17 PROCEDURE — 82805 BLOOD GASES W/O2 SATURATION: CPT

## 2021-04-17 PROCEDURE — 80048 BASIC METABOLIC PNL TOTAL CA: CPT

## 2021-04-17 PROCEDURE — 85025 COMPLETE CBC W/AUTO DIFF WBC: CPT

## 2021-04-17 PROCEDURE — 83880 ASSAY OF NATRIURETIC PEPTIDE: CPT

## 2021-04-17 PROCEDURE — 94760 N-INVAS EAR/PLS OXIMETRY 1: CPT

## 2021-04-17 PROCEDURE — 87081 CULTURE SCREEN ONLY: CPT

## 2021-04-17 PROCEDURE — 85027 COMPLETE CBC AUTOMATED: CPT

## 2021-04-17 PROCEDURE — 96374 THER/PROPH/DIAG INJ IV PUSH: CPT

## 2021-04-17 PROCEDURE — 36415 COLL VENOUS BLD VENIPUNCTURE: CPT

## 2021-04-17 PROCEDURE — 87635 SARS-COV-2 COVID-19 AMP PRB: CPT

## 2021-04-17 PROCEDURE — 71045 X-RAY EXAM CHEST 1 VIEW: CPT

## 2021-04-17 PROCEDURE — 02HV33Z INSERTION OF INFUSION DEVICE INTO SUPERIOR VENA CAVA, PERCUTANEOUS APPROACH: ICD-10-PCS | Performed by: SURGERY

## 2021-04-17 PROCEDURE — 87186 SC STD MICRODIL/AGAR DIL: CPT

## 2021-04-17 PROCEDURE — 87088 URINE BACTERIA CULTURE: CPT

## 2021-04-17 PROCEDURE — 87040 BLOOD CULTURE FOR BACTERIA: CPT

## 2021-04-17 PROCEDURE — 84145 PROCALCITONIN (PCT): CPT

## 2021-04-17 PROCEDURE — 83735 ASSAY OF MAGNESIUM: CPT

## 2021-04-17 PROCEDURE — 85610 PROTHROMBIN TIME: CPT

## 2021-04-17 PROCEDURE — 83605 ASSAY OF LACTIC ACID: CPT

## 2021-04-17 RX ADMIN — Medication SCH MLS/HR: at 19:20

## 2021-04-17 RX ADMIN — Medication SCH MLS/HR: at 20:29

## 2021-04-17 RX ADMIN — SODIUM CHLORIDE, SODIUM LACTATE, POTASSIUM CHLORIDE, AND CALCIUM CHLORIDE SCH MLS/HR: 600; 310; 30; 20 INJECTION, SOLUTION INTRAVENOUS at 15:24

## 2021-04-17 NOTE — ED GENERAL
General


Stated Complaint:  ULTER MENTAL STATUS


Source of Information:  Patient, EMS, Nursing Home Records


Exam Limitations:  No Limitations





History of Present Illness


Date Seen by Provider:  2021


Time Seen by Provider:  10:30


Initial Comments


The patient arrives to the ER by EMS from [facility] where she lives 

with chief complaint of 1 day worsening confusion.  Yesterday she was started on

Macrobid for UTI.  She has history of urine cultures growing out E. coli pansens

itive.  She was at the nursing home for rehab after a fall which broke her right

metatarsal.  She has no history of need for supplemental oxygen but EMS stated 

was very difficult to get a good oxygen sat above the mid 80s so they put her on

2 L.  Patient's blood pressure was 82/54 by nursing staff when they examined the

patient this morning.  Concern was for urosepsis.  The patient is very confused 

and states that she is concerned about loperamide being stuck in her teeth and 

does not contribute much other meaningful history.  She says she is having some 

pain on the right side of her chest that is burning and very exquisitely sensit

magen to touch.


The patient received her first Covid vaccine on 2016 days ago.





Allergies and Home Medications


Allergies


Coded Allergies:  


     nystatin (Verified  Allergy, Mild, HIVES, 17)


     Sulfa (Sulfonamide Antibiotics) (Verified  Allergy, Unknown, 19)


     amoxicillin (Verified  Allergy, Unknown, 17)


     balsalazide (Verified  Allergy, Unknown, 10/7/20)


     chlorhexidine (Verified  Allergy, Unknown, HIVES, 17)


     clindamycin (Verified  Allergy, Unknown, 17)


     dicyclomine (Verified  Allergy, Unknown, 19)


     mesalamine (Verified  Allergy, Unknown, 10/7/20)


     sulfamethoxazole (Unverified  Allergy, Unknown, 10/16/14)


     trimethoprim (Unverified  Allergy, Unknown, 10/16/14)





Home Medications


Ascorbic Acid/Collagen Hydr 1 Each Capsule, 1 EACH PO DAILY, (Reported)


Bacillus Coagulans 1 Each Tab.chew, 1 EACH PO DAILY, (Reported)


Budesonide 3 Mg Capdr...er, 3 MG PO PC


   Prescribed by: MANNY SALGADO on 19 1110


Hydrocodone/Acetaminophen 1 Each Tablet, 0.5-1 TAB PO Q4H PRN for PAIN-MODERATE 

(5-7)


   Prescribed by: SYLVIE MOORE on 10/7/20 1816


Krill/Om3/Dha/Epa/Om6/Lip/Astx 1 Each Capsule, 1 EACH PO DAILY, (Reported)


ENRICOacidoph & EMI Driscolllactis 1 Each Capsule, 1 EACH PO TID


   Prescribed by: SYLVIE MOORE on 10/7/20 1817


Loperamide HCl 2 Mg Tablet, 2 MG PO TID PRN for DIAPER CHANGE, (Reported)


Loratadine 10 Mg Tablet, 10 MG PO DAILY PRN for ALLERGIES, (Reported)


Loratadine 10 Mg Tablet, 10 MG PO DAILY, (Reported)





Patient Home Medication List


Home Medication List Reviewed:  Yes





Review of Systems


Review of Systems


Constitutional:  see HPI (History per patient, nursing staff, EMS and records); 

No chills, No diaphoresis


EENTM:  No ear discharge, No ear pain


Respiratory:  No cough, No short of breath


Cardiovascular:  chest pain; No edema


Gastrointestinal:  No abdominal pain, No nausea


Genitourinary:  see HPI; No discharge, No dysuria


Musculoskeletal:  see HPI, joint pain


Skin:  rash (Right anterior chest wall)


Hematologic/Lymphatic:  Anemia; Denies Blood Clots





All Other Systems Reviewed


Negative Unless Noted:  Yes





Past Medical-Social-Family Hx


Patient Social History


Alcohol Use:  Denies Use


Smoking Status:  Never a Smoker


2nd Hand Smoke Exposure:  No


Recent Hopitalizations:  No





Seasonal Allergies


Seasonal Allergies:  Yes





Past Medical History


Surgeries:  Yes (RIGHT KNEE SURGERY (SCOPE)  R hip fx)


Adenoidectomy, Breast


Respiratory:  No


Pneumonia


Cardiac:  Yes (dependent edema of legs)


Chronic Edema/Swelling


Neurological:  No


Genitourinary:  No


Gastrointestinal:  Yes (FISSURE)


Colitis, Hemorrhoids, Chronic Diarrhea


Musculoskeletal:  Yes (SPRAINED RT ANKLE 3 YRS. AGO AND WEARS BRACE TO RIGHT 

ANKLE.  KNEE SCOPED)


Chronic Back Pain


Endocrine:  No


HEENT:  Yes


Hearing Impairment:  Hard of Hearing


Cancer:  No


Psychosocial:  No


Integumentary:  No


Blood Disorders:  No





Family Medical History


No Pertinent Family Hx





Physical Exam


Vital Signs





Vital Signs - First Documented








 21





 10:30


 


Temp 35.7


 


Pulse 92


 


Resp 18


 


B/P (MAP) 105/43 (63)


 


Pulse Ox 4


 


O2 Delivery Nasal Cannula


 


O2 Flow Rate 2.00





Capillary Refill :


Height, Weight, BMI


Height: 5'6.00"


Weight: 97lbs. 0.0oz. 43.114857ye; 18.00 BMI


Method:Stated


General Appearance:  Mild Distress, Thin


Eyes:  Bilateral Eye Normal Inspection, Bilateral Eye PERRL, Bilateral Eye EOMI


HEENT:  PERRL/EOMI, TMs Normal, Normal ENT Inspection (Atraumatic head), Pharynx

Normal; No Moist Mucous Membranes (Mildly dry oral mucosa)


Neck:  Full Range of Motion, Normal Inspection, Non Tender


Respiratory:  Lungs Clear, Normal Breath Sounds, No Accessory Muscle Use, No 

Respiratory Distress, Other (Her hands are very cool and her capillary refill is

prolonged and oxygen saturations read in the upper 80s.  Right-sided chest wall 

is inflamed red and tender to palpation in a dermatomal distribution)


Cardiovascular:  Regular Rate, Rhythm (Upper 90s), No JVD, Normal Peripheral 

Pulses


Gastrointestinal:  Normal Bowel Sounds, Non Tender, Soft


Extremity:  Normal Capillary Refill, Normal Inspection, No Pedal Edema


Neurologic/Psychiatric:  Alert, No Motor/Sensory Deficits; No Normal Mood/Affect

(Mildly anxious); CNs II-XII Norm as Tested, Other (Oriented to person and place

but not time or situation)


Skin:  Rash (Painful erythematous rash in a dermatomal presentation over the 

right chest wall anterior and posteriorly)





Focused Exam


Lactate Level


21 11:42: Lactic Acid Level 4.75*H





Lactic Acid Level





Laboratory Tests








Test


 21


11:42


 


Lactic Acid Level


 4.75 MMOL/L


(0.50-2.00)  *H











Procedures/Interventions


Patient Education:  Explained Benefits, Explained Risks, Pt. Ack. Understanding


Breath Sounds per Auscultation:  Clear


Heart Sounds per Auscultation:  Regular


Airway Exam:  Mouth opens >2 fingers





Progress/Results/Core Measures


Suspected Sepsis


SIRS


Temperature: 


Pulse:  


Respiratory Rate: 


 


Laboratory Tests


21 11:38: White Blood Count 16.8H


Blood Pressure  / 


Mean: 


 





21 11:42: Lactic Acid Level 4.75*H


Laboratory Tests


21 11:38: 


Creatinine 2.23H, INR Comment 1.0, Platelet Count 221, Total Bilirubin 0.5








Results/Orders


Lab Results





Laboratory Tests








Test


 21


11:05 21


11:29 21


11:38 21


11:42 Range/Units


 


 


Urine Color YELLOW      


 


Urine Clarity SL CLOUDY      


 


Urine pH 5.0     5-9  


 


Urine Specific Gravity 1.025 H    1.016-1.022  


 


Urine Protein 2+ H    NEGATIVE  


 


Urine Glucose (UA) NEGATIVE     NEGATIVE  


 


Urine Ketones TRACE H    NEGATIVE  


 


Urine Nitrite NEGATIVE     NEGATIVE  


 


Urine Bilirubin 1+ H    NEGATIVE  


 


Urine Urobilinogen 0.2     < = 1.0  MG/DL


 


Urine Leukocyte Esterase 2+ H    NEGATIVE  


 


Urine RBC (Auto) 2+ H    NEGATIVE  


 


Urine RBC NONE      /HPF


 


Urine WBC TNTC H     /HPF


 


Urine Crystals NONE      /LPF


 


Urine Bacteria FEW H     /HPF


 


Urine Casts NONE      /LPF


 


Urine Mucus NEGATIVE      /LPF


 


Urine Culture Indicated NO      


 


Blood Gas Puncture Site  RT RAD     


 


Blood Gas Patient Temperature  35.8     


 


Arterial Blood pH  7.42    7.37-7.43  


 


Arterial Blood Partial


Pressure CO2 


 28 L


 


 


 35-45  MMHG





 


Arterial Blood Partial


Pressure O2 


 84 


 


 


 79-93  MMHG





 


Arterial Blood HCO3  18 L   23-27  MMOL/L


 


Arterial Blood Total CO2


 


 18.9 L


 


 


 21.0-31.0


MMOL/L


 


Arterial Blood Oxygen


Saturation 


 97 


 


 


   %





 


Arterial Blood Base Excess


 


 -5.8 L


 


 


 -2.5-2.5


MMOL/L


 


Binu Test  YES-POS     


 


Blood Gas Ventilator Setting  NO     


 


Blood Gas Inspired Oxygen  2 L     


 


White Blood Count


 


 


 16.8 H


 


 4.3-11.0


10^3/uL


 


Red Blood Count


 


 


 4.41 


 


 3.80-5.11


10^6/uL


 


Hemoglobin   13.8   11.5-16.0  g/dL


 


Hematocrit   42   35-52  %


 


Mean Corpuscular Volume   94   80-99  fL


 


Mean Corpuscular Hemoglobin   31   25-34  pg


 


Mean Corpuscular Hemoglobin


Concent 


 


 33 


 


 32-36  g/dL





 


Red Cell Distribution Width   14.4   10.0-14.5  %


 


Platelet Count


 


 


 221 


 


 130-400


10^3/uL


 


Mean Platelet Volume   9.8   9.0-12.2  fL


 


Immature Granulocyte % (Auto)   1    %


 


Neutrophils (%) (Auto)   87 H  42-75  %


 


Lymphocytes (%) (Auto)   1 L  12-44  %


 


Monocytes (%) (Auto)   1   0-12  %


 


Eosinophils (%) (Auto)   10   0-10  %


 


Basophils (%) (Auto)   0   0-10  %


 


Neutrophils # (Auto)


 


 


 14.6 H


 


 1.8-7.8


10^3/uL


 


Lymphocytes # (Auto)


 


 


 0.2 L


 


 1.0-4.0


10^3/uL


 


Monocytes # (Auto)


 


 


 0.2 


 


 0.0-1.0


10^3/uL


 


Eosinophils # (Auto)


 


 


 1.6 H


 


 0.0-0.3


10^3/uL


 


Basophils # (Auto)


 


 


 0.0 


 


 0.0-0.1


10^3/uL


 


Immature Granulocyte # (Auto)


 


 


 0.1 


 


 0.0-0.1


10^3/uL


 


Neutrophils % (Manual)   48    %


 


Lymphocytes % (Manual)   3    %


 


Monocytes % (Manual)   3    %


 


Eosinophils % (Manual)   8    %


 


Metamyelocytes %   2    %


 


Band Neutrophils   36    %


 


Toxic Granulation   1+    


 


Blood Morphology Comment   NORMAL    


 


Prothrombin Time   13.8   12.2-14.7  SEC


 


INR Comment   1.0   0.8-1.4  


 


Activated Partial


Thromboplast Time 


 


 30 


 


 24-35  SEC





 


Sodium Level   128 L  135-145  MMOL/L


 


Potassium Level   4.4   3.6-5.0  MMOL/L


 


Chloride Level   95 L    MMOL/L


 


Carbon Dioxide Level   18 L  21-32  MMOL/L


 


Anion Gap   15 H  5-14  MMOL/L


 


Blood Urea Nitrogen   30 H  7-18  MG/DL


 


Creatinine


 


 


 2.23 H


 


 0.60-1.30


MG/DL


 


Estimat Glomerular Filtration


Rate 


 


 21 


 


  





 


BUN/Creatinine Ratio   13    


 


Glucose Level   142 H    MG/DL


 


Calcium Level   8.7   8.5-10.1  MG/DL


 


Corrected Calcium   9.5   8.5-10.1  MG/DL


 


Total Bilirubin   0.5   0.1-1.0  MG/DL


 


Aspartate Amino Transf


(AST/SGOT) 


 


 13 


 


 5-34  U/L





 


Alanine Aminotransferase


(ALT/SGPT) 


 


 6 


 


 0-55  U/L





 


Alkaline Phosphatase   126     U/L


 


B-Type Natriuretic Peptide   113.3 H  <100.0  PG/ML


 


Total Protein   6.0 L  6.4-8.2  GM/DL


 


Albumin   3.0 L  3.2-4.5  GM/DL


 


Lactic Acid Level


 


 


 


 4.75 *H


 0.50-2.00


MMOL/L


 


Test


 21


13:05 


 


 


 Range/Units


 


 


Coronavirus 2019 (MORTEZA) Not Detected     Not Detecte  








My Orders





Orders - JOSSELYN CAREY


Cbc With Automated Diff (21 10:40)


Comprehensive Metabolic Panel (21 10:40)


Ua Culture If Indicated (21 10:40)


Straight Cath For Spec.-Adult (21 10:40)


Chest 1 View, Ap/Pa Only (21 10:40)


Blood Culture (21 10:40)


Sputum Culture (21 10:40)


Urine Culture (21 10:40)


Protime With Inr (21 10:40)


Partial Thromboplastin Time (21 10:40)


Ed Iv/Invasive Line Start (21 10:40)


Ed Iv/Invasive Line Start (21 10:40)


Vital Signs Adult Sepsis Patie Q15M (21 10:40)


O2 (21 10:40)


Remove Rings In Anticipation O (21 10:40)


Lactic Acid Analyzer (21 10:40)


Lactated Ringers (Lr 1000 Ml Iv Solution (21 10:45)


Ceftriaxone  For Iv Use (Rocephin  For I (21 10:45)


Acyclovir Capsule/Tablet (Zovirax  Caps (21 11:15)


Arterial Blood Gas (21 11:13)


Manual Differential (21 11:38)


Ed Iv/Invasive Line Start (21 12:07)


Ns Iv 1000 Ml (Sodium Chloride 0.9%) (21 12:15)


Loratadine Tablet (Claritin Tablet) (21 13:00)


BNP (21 12:59)


Covid 19 Inhouse Test (21 12:59)





Medications Given in ED





Current Medications








 Medications  Dose


 Ordered  Sig/Tana


 Route  Start Time


 Stop Time Status Last Admin


Dose Admin


 


 Acyclovir  800 mg  ONCE  ONCE


 PO  21 11:15


 21 11:16 DC 21 12:29


800 MG


 


 Ceftriaxone


 Sodium 1000 mg/


 Sterile Water  10 ml @ 


 200 mls/hr  ONCE  ONCE


 IV  21 10:45


 21 10:47 DC 21 11:37


200 MLS/HR


 


 Lactated Ringer's  1,000 ml @ 


 0 mls/hr  Q0M ONCE


 IV  21 10:45


 21 10:46 DC 21 11:37


1,000 MLS/HR


 


 Loratadine  10 mg  ONCE  ONCE


 PO  21 13:00


 21 13:01 DC 21 13:55


10 MG








Vital Signs/I&O











 21





 10:30 10:50


 


Temp 35.7 


 


Pulse 92 


 


Resp 18 


 


B/P (MAP) 105/43 (63) 


 


Pulse Ox 4 89


 


O2 Delivery Nasal Cannula Nasal Cannula


 


O2 Flow Rate 2.00 2.00





Capillary Refill :


Progress Note #1:  


   Time:  11:04


Progress Note


Her rash appears to be shingles.  However there are new evidence of trauma.  

Suspect she has a UTI not controlled with 1 day of antibiotics.  Septic work-up 

based on her heart rate and suspicion for UTI as well as her acute 

encephalopathy.  Acyclovir.


Progress Note #2:  


   Time:  14:18


Progress Note


The patient's rash is expanded all over her whole body.  It is macular, 

blanchable, erythema without pruritus.  Suspect this may be related to the 

Rocephin since she has a stated amoxicillin allergy we will switch her to 

Levaquin.  Loratadine





Diagnostic Imaging





   Diagonstic Imaging:  Xray


   Plain Films/CT/US/NM/MRI:  chest


Comments


NAME:   VIANEY CHAMBERLAIN


[facility] REC#:   [id number]


ACCOUNT#:   [id number]


PT STATUS:   REG ER


:   1935


PHYSICIAN:   JOSSELYN CAREY MD


ADMIT DATE:   21/ER


                                   ***Draft***


Date of Exam:21





CHEST 1 VIEW, AP/PA ONLY








Portable erect AP chest at 11:46.





Indication:  Right rib pain





Both the heart and the central pulmonary vascularity do seem


somewhat more prominent than noted on the prior exam of


10/07/2020. These findings do raise a question of early/mild


pulmonary congestion. There are also small patchy areas of


increased density in the right midlung, right lung base and left


lower lobe. These findings may be related to mild


pneumonia/atelectasis. There is no significant pleural effusion


identified. The mediastinum is not widened. The osseous


structures are intact. 





Impression:  There is slight prominence of the heart and the


central pulmonary vasculature when compared to the prior study,


does raise a question of early/mild pulmonary congestion. There


are patchy densities in the lungs, may also be related to mild


pneumonia/atelectasis. A followup study would be recommended for


continued evaluation.








  Dictated on workstation # [id number]








Dict:   21 1205


Trans:   21 1214


CV [id number]





Interpreted by:     YOGI LOPEZ MD


Electronically signed by:


   Reviewed:  Reviewed by Me





Departure


Communication (Admissions)


Time/Spoke to Admitting Phy:  13:15


Discussed case with Dr. Castañeda and he agrees to accept the patient to the 

stepdown





Impression





   Primary Impression:  


   Sepsis


   Qualified Codes:  A41.9 - Sepsis, unspecified organism; R65.21 - Severe 

   sepsis with septic shock; G93.40 - Encephalopathy, unspecified


   Additional Impressions:  


   UTI (urinary tract infection)


   Qualified Codes:  N30.00 - Acute cystitis without hematuria


   Delirium due to another medical condition


Disposition:   ADMITTED AS INPATIENT


Condition:  Stable





Admissions


Decision to Admit Reason:  Admit from ER (General)


Decision to Admit/Date:  2021


Time/Decision to Admit Time:  12:30





Departure-Patient Inst.


Referrals:  


TY MENA MD (PCP/Family)


Primary Care Physician











JOSSELYN CAREY                 2021 11:04

## 2021-04-17 NOTE — DIAGNOSTIC IMAGING REPORT
Portable erect AP chest at 11:46.



Indication:  Right rib pain



Both the heart and the central pulmonary vascularity do seem

somewhat more prominent than noted on the prior exam of

10/07/2020. These findings do raise a question of early/mild

pulmonary congestion. There are also small patchy areas of

increased density in the right midlung, right lung base and left

lower lobe. These findings may be related to mild

pneumonia/atelectasis. There is no significant pleural effusion

identified. The mediastinum is not widened. The osseous

structures are intact. 



Impression:  There is slight prominence of the heart and the

central pulmonary vasculature when compared to the prior study,

does raise a question of early/mild pulmonary congestion. There

are patchy densities in the lungs, may also be related to mild

pneumonia/atelectasis. A followup study would be recommended for

continued evaluation.



Dictated by: 



  Dictated on workstation # [id number]

## 2021-04-17 NOTE — DIAGNOSTIC IMAGING REPORT
EXAMINATION: Chest 1 view.



HISTORY: Central line placement.



COMPARISON: 04/17/2021 at 11:00 a.m.



FINDINGS: There has been placement of a left PICC with the tip

overlying the mid SVC. There is continued cardiomegaly with

increased central pulmonary vessel congestion and pulmonary

edema. No large pleural effusion or pneumothorax.



IMPRESSION: 

1. Cardiomegaly with increasing pulmonary edema.

2. Left PICC with the tip overlying the mid SVC. 



Dictated by: 



  Dictated on workstation # [id number]

## 2021-04-17 NOTE — OPERATIVE REPORT
DATE OF SERVICE:  04/17/2021



ATTENDING PRIMARY CARE PHYSICIAN:

Dr. Wilbert Dean.



PREOPERATIVE DIAGNOSES:

Urosepsis, hypotension, atrial fibrillation with rapid ventricular response.



POSTOPERATIVE DIAGNOSES:

Urosepsis, hypotension, atrial fibrillation with rapid ventricular response.



PROCEDURE:

Placement of left subclavian central venous catheter.



SURGEON:

Alli Vuong MD



ANESTHESIA:

Local.



ESTIMATED BLOOD LOSS:

Minimal.



FINDINGS:

Catheter tip at superior vena caval - right atrial junction.



DISPOSITION:

The patient tolerated the procedure well.



INDICATIONS:

The patient is an 86-year-old female brought over from [facility] due

to confusion as well as hypotension.  She was found to have urinary tract

infection and was started on Macrobid; however, has had declining blood pressure

as well as confusion.  She was admitted to [facility] where she was

again found to be hypotensive and she also developed atrial fibrillation as well

as rapid ventricular response and continued to be hypotensive.  She will require

a central venous catheter for multiple medications including vasopressors as

well as antiarrhythmics.



DESCRIPTION OF PROCEDURE:

The chest and neck were prepped and draped in standard surgical fashion.  A 1%

lidocaine was then used to anesthetize the left subclavian region.  The left

subclavian vein was then cannulated with drawing of venous blood and the

guidewire was then inserted without any resistance.  The cannulating needle

removed and a skin incision made using a #11 blade and a tract was then created

using a venous dilator.  Through this opening, a triple lumen central venous

catheter was placed over the guidewire using the Seldinger technique.  Guidewire

was removed and all three ports peter venous blood and saline pushed in without

any resistance.  The catheter was then sutured to the skin using interrupted 3-0

silk sutures.  Catheter was then cleaned and covered with Op-Site.  The patient

tolerated the procedure well.  Postprocedure, chest x-ray showed adequate

placement and the catheter may be used at any time.





Job ID: [id number]

DocumentID: [id number]

Dictated Date:  04/17/2021 20:21:20

Transcription Date: 04/17/2021 20:58:42

Dictated By: ALLI VUONG MD

## 2021-04-18 VITALS — DIASTOLIC BLOOD PRESSURE: 56 MMHG | SYSTOLIC BLOOD PRESSURE: 122 MMHG

## 2021-04-18 VITALS — SYSTOLIC BLOOD PRESSURE: 110 MMHG | DIASTOLIC BLOOD PRESSURE: 88 MMHG

## 2021-04-18 VITALS — SYSTOLIC BLOOD PRESSURE: 110 MMHG | DIASTOLIC BLOOD PRESSURE: 54 MMHG

## 2021-04-18 VITALS — SYSTOLIC BLOOD PRESSURE: 130 MMHG | DIASTOLIC BLOOD PRESSURE: 60 MMHG

## 2021-04-18 VITALS — SYSTOLIC BLOOD PRESSURE: 99 MMHG | DIASTOLIC BLOOD PRESSURE: 51 MMHG

## 2021-04-18 VITALS — SYSTOLIC BLOOD PRESSURE: 124 MMHG | DIASTOLIC BLOOD PRESSURE: 57 MMHG

## 2021-04-18 VITALS — DIASTOLIC BLOOD PRESSURE: 55 MMHG | SYSTOLIC BLOOD PRESSURE: 111 MMHG

## 2021-04-18 VITALS — DIASTOLIC BLOOD PRESSURE: 55 MMHG | SYSTOLIC BLOOD PRESSURE: 112 MMHG

## 2021-04-18 VITALS — DIASTOLIC BLOOD PRESSURE: 64 MMHG | SYSTOLIC BLOOD PRESSURE: 110 MMHG

## 2021-04-18 VITALS — SYSTOLIC BLOOD PRESSURE: 97 MMHG | DIASTOLIC BLOOD PRESSURE: 52 MMHG

## 2021-04-18 VITALS — SYSTOLIC BLOOD PRESSURE: 111 MMHG | DIASTOLIC BLOOD PRESSURE: 51 MMHG

## 2021-04-18 VITALS — SYSTOLIC BLOOD PRESSURE: 117 MMHG | DIASTOLIC BLOOD PRESSURE: 57 MMHG

## 2021-04-18 VITALS — SYSTOLIC BLOOD PRESSURE: 122 MMHG | DIASTOLIC BLOOD PRESSURE: 59 MMHG

## 2021-04-18 VITALS — DIASTOLIC BLOOD PRESSURE: 57 MMHG | SYSTOLIC BLOOD PRESSURE: 117 MMHG

## 2021-04-18 VITALS — SYSTOLIC BLOOD PRESSURE: 107 MMHG | DIASTOLIC BLOOD PRESSURE: 52 MMHG

## 2021-04-18 VITALS — DIASTOLIC BLOOD PRESSURE: 52 MMHG | SYSTOLIC BLOOD PRESSURE: 116 MMHG

## 2021-04-18 VITALS — DIASTOLIC BLOOD PRESSURE: 52 MMHG | SYSTOLIC BLOOD PRESSURE: 113 MMHG

## 2021-04-18 VITALS — SYSTOLIC BLOOD PRESSURE: 81 MMHG | DIASTOLIC BLOOD PRESSURE: 67 MMHG

## 2021-04-18 VITALS — SYSTOLIC BLOOD PRESSURE: 117 MMHG | DIASTOLIC BLOOD PRESSURE: 53 MMHG

## 2021-04-18 VITALS — DIASTOLIC BLOOD PRESSURE: 56 MMHG | SYSTOLIC BLOOD PRESSURE: 104 MMHG

## 2021-04-18 VITALS — DIASTOLIC BLOOD PRESSURE: 59 MMHG | SYSTOLIC BLOOD PRESSURE: 108 MMHG

## 2021-04-18 VITALS — DIASTOLIC BLOOD PRESSURE: 51 MMHG | SYSTOLIC BLOOD PRESSURE: 103 MMHG

## 2021-04-18 VITALS — DIASTOLIC BLOOD PRESSURE: 62 MMHG | SYSTOLIC BLOOD PRESSURE: 116 MMHG

## 2021-04-18 VITALS — SYSTOLIC BLOOD PRESSURE: 105 MMHG | DIASTOLIC BLOOD PRESSURE: 59 MMHG

## 2021-04-18 LAB
BASOPHILS # BLD AUTO: 0 10^3/UL (ref 0–0.1)
BASOPHILS NFR BLD AUTO: 0 % (ref 0–10)
BUN/CREAT SERPL: 15
CALCIUM SERPL-MCNC: 7.4 MG/DL (ref 8.5–10.1)
CHLORIDE SERPL-SCNC: 102 MMOL/L (ref 98–107)
CO2 SERPL-SCNC: 17 MMOL/L (ref 21–32)
CREAT SERPL-MCNC: 2.06 MG/DL (ref 0.6–1.3)
EOSINOPHIL # BLD AUTO: 2.1 10^3/UL (ref 0–0.3)
EOSINOPHIL NFR BLD AUTO: 14 % (ref 0–10)
GFR SERPLBLD BASED ON 1.73 SQ M-ARVRAT: 23 ML/MIN
GLUCOSE SERPL-MCNC: 132 MG/DL (ref 70–105)
HCT VFR BLD CALC: 34 % (ref 35–52)
HGB BLD-MCNC: 11.5 G/DL (ref 11.5–16)
LYMPHOCYTES # BLD AUTO: 0.4 10^3/UL (ref 1–4)
LYMPHOCYTES NFR BLD AUTO: 3 % (ref 12–44)
MAGNESIUM SERPL-MCNC: 1.2 MG/DL (ref 1.6–2.4)
MANUAL DIFFERENTIAL PERFORMED BLD QL: NO
MCH RBC QN AUTO: 32 PG (ref 25–34)
MCHC RBC AUTO-ENTMCNC: 34 G/DL (ref 32–36)
MCV RBC AUTO: 94 FL (ref 80–99)
MONOCYTES # BLD AUTO: 0.4 10^3/UL (ref 0–1)
MONOCYTES NFR BLD AUTO: 3 % (ref 0–12)
NEUTROPHILS # BLD AUTO: 12.1 10^3/UL (ref 1.8–7.8)
NEUTROPHILS NFR BLD AUTO: 80 % (ref 42–75)
PHOSPHATE SERPL-MCNC: 2.9 MG/DL (ref 2.3–4.7)
PLATELET # BLD: 176 10^3/UL (ref 130–400)
PMV BLD AUTO: 9 FL (ref 9–12.2)
POTASSIUM SERPL-SCNC: 3.9 MMOL/L (ref 3.6–5)
SODIUM SERPL-SCNC: 129 MMOL/L (ref 135–145)
WBC # BLD AUTO: 15.1 10^3/UL (ref 4.3–11)

## 2021-04-18 RX ADMIN — Medication SCH MLS/HR: at 08:27

## 2021-04-18 RX ADMIN — MAGNESIUM SULFATE IN DEXTROSE SCH MLS/HR: 10 INJECTION, SOLUTION INTRAVENOUS at 04:32

## 2021-04-18 RX ADMIN — SODIUM CHLORIDE SCH MLS/HR: 900 INJECTION INTRAVENOUS at 15:24

## 2021-04-18 RX ADMIN — ACETAMINOPHEN PRN MG: 325 TABLET ORAL at 02:11

## 2021-04-18 RX ADMIN — SODIUM CHLORIDE, SODIUM LACTATE, POTASSIUM CHLORIDE, AND CALCIUM CHLORIDE SCH MLS/HR: 600; 310; 30; 20 INJECTION, SOLUTION INTRAVENOUS at 00:01

## 2021-04-18 RX ADMIN — Medication SCH MLS/HR: at 19:50

## 2021-04-18 RX ADMIN — SODIUM CHLORIDE, SODIUM LACTATE, POTASSIUM CHLORIDE, AND CALCIUM CHLORIDE SCH MLS/HR: 600; 310; 30; 20 INJECTION, SOLUTION INTRAVENOUS at 08:22

## 2021-04-18 RX ADMIN — POTASSIUM CHLORIDE SCH MLS/HR: 200 INJECTION, SOLUTION INTRAVENOUS at 04:31

## 2021-04-18 RX ADMIN — SODIUM CHLORIDE, SODIUM LACTATE, POTASSIUM CHLORIDE, AND CALCIUM CHLORIDE SCH MLS/HR: 600; 310; 30; 20 INJECTION, SOLUTION INTRAVENOUS at 22:38

## 2021-04-18 RX ADMIN — POTASSIUM CHLORIDE SCH MEQ: 1500 TABLET, EXTENDED RELEASE ORAL at 04:31

## 2021-04-18 RX ADMIN — MAGNESIUM SULFATE IN DEXTROSE SCH MLS/HR: 10 INJECTION, SOLUTION INTRAVENOUS at 15:25

## 2021-04-18 RX ADMIN — MAGNESIUM SULFATE IN DEXTROSE SCH MLS/HR: 10 INJECTION, SOLUTION INTRAVENOUS at 11:44

## 2021-04-18 RX ADMIN — MAGNESIUM SULFATE IN DEXTROSE SCH MLS/HR: 10 INJECTION, SOLUTION INTRAVENOUS at 15:24

## 2021-04-18 NOTE — HISTORY & PHYSICAL-HOSPITALIST
History of Present Illness


HPI/Chief Complaint


Zeenat Larry is an 86-year-old female with history of colitis, hip fracture, 

seasonal allergies, debility currently at [facility], who presented 

with confusion.  She denies any fevers or chills.  She denies any shortness of 

breath or cough.  She denies any dysuria or urinary symptoms.  She denies any 

abdominal pain, nausea, vomiting, or diarrhea.  She says she had no idea that 

she was sick.  She is oriented to person, place, and date at the time of my 

examination.


Source:  patient


Exam Limitations:  no limitations


Date Seen


4/18/21


Time Seen by a Provider:  09:55


Attending Physician


Keith White MD


PCP


Wilbert Dean MD


Referring Physician





Date of Admission


Apr 17, 2021 at 14:25





Home Medications & Allergies


Home Medications


Reviewed patient Home Medication Reconciliation performed by pharmacy medication

reconciliations technician and/or nursing.


Patients Allergies have been reviewed.





Allergies





Allergies


Coded Allergies


  nystatin (Verified Allergy, Mild, HIVES, 7/5/17)


  Sulfa (Sulfonamide Antibiotics) (Verified Allergy, Unknown, 8/14/19)


  amoxicillin (Verified Allergy, Unknown, 7/21/17)


  balsalazide (Verified Allergy, Unknown, 10/7/20)


  chlorhexidine (Verified Allergy, Unknown, HIVES, 7/5/17)


  clindamycin (Verified Allergy, Unknown, 7/21/17)


  dicyclomine (Verified Allergy, Unknown, 8/14/19)


  mesalamine (Verified Allergy, Unknown, 10/7/20)


  sulfamethoxazole (Unverified Allergy, Unknown, 10/16/14)


  trimethoprim (Unverified Allergy, Unknown, 10/16/14)








Patient Social History


Tobacco Use?:  No


Smoking Status:  Never a Smoker


Substance use?:  No


Alcohol Use?:  No


Pt stated abuse/neglect:  No





Immunizations Up To Date


Influenza Vaccine Up-to-Date:  Yes; Up-to-Date





Current Status


Pregnancy status:  No


Do you have an Advance Directi:  No


Communicates:  Verbally


Primary Language:  English


Preferred Spoken Language:  English


Is interpretation needed?:  No





Past Medical History





Colitis


Hip fracture


Seasonal allergies





Family Medical History


Family Hx:  


Noncontributory





Review of Systems


Constitutional:  no symptoms reported


EENTM:  no symptoms reported


Respiratory:  no symptoms reported


Cardiovascular:  no symptoms reported


Gastrointestinal:  no symptoms reported


Genitourinary:  no symptoms reported


Musculoskeletal:  no symptoms reported


Skin:  no symptoms reported


Psychiatric/Neurological:  No Symptoms Reported





Physical Exam


Physical Exam


Vital Signs





Vital Signs - First Documented








 4/17/21





 10:30


 


Temp 35.7


 


Pulse 92


 


Resp 18


 


B/P (MAP) 105/43 (63)


 


Pulse Ox 4


 


O2 Delivery Nasal Cannula


 


O2 Flow Rate 2.00





Capillary Refill : Less Than 3 Seconds


Height, Weight, BMI


Height: 5'6.00"


Weight: 97lbs. 0.0oz. 43.445028by; 19.83 BMI


Method:Stated


General Appearance:  No Apparent Distress, Chronically ill, Thin


HEENT:  PERRL/EOMI, Pharynx Normal


Neck:  Normal Inspection, Supple


Respiratory:  Lungs Clear, Normal Breath Sounds, No Respiratory Distress


Cardiovascular:  Regular Rate, Rhythm, No Edema, No Murmur


Gastrointestinal:  Normal Bowel Sounds, Non Tender, Soft


Extremity:  Normal Inspection, Non Tender, No Pedal Edema


Neurologic/Psychiatric:  Alert, Oriented x3, No Motor/Sensory Deficits, Normal 

Mood/Affect


Skin:  Warm/Dry, Erythema


Lymphatic:  No Adenopathy





Results


Results/Procedures


Labs


Laboratory Tests


4/17/21 11:38








4/18/21 02:20








Patient resulted labs reviewed.


Imaging:  Reviewed Imaging Report





Assessment/Plan


Admission Diagnosis


Septic shock due to UTI


Admission Status:  Inpatient Order (span 2 midnights)


Reason for Inpatient Admission:  


Septic shock





Assessment and Plan


Septic shock due to UTI


Lactic acidosis


Acute kidney injury likely due to ATN


Advanced age


Poor prognosis


   SIRS+ with leukocytosis and tachycardia


   Creatinine >2, baseline <1


   Lactic acid >4, improved and normalized with fluids


   Central line placed by surgery, started on pressors


   Procalcitonin >9


   Blood cultures pending


   UA consistent with UTI


   Started on antibiotics outpatient prior to admission


   Urine culture with E coli


   Await final ID and susceptibilities


   Started on Ciprofloxacin


   Transition to [place]


   IV fluids


   Discussed code status, patient requests full code





Atrial fibrillation with RVR


   Cardiology consulted, appreciate assistance


   Started on IV Cardizem


   Lovenox





Hyponatremia


Hypomagnesemia


   Monitor and correct electrolytes as needed





Critical Care


Critically Ill Patient





Diagnosis/Problems


Diagnosis/Problems





(1) Septic shock


Status:  Acute


(2) Lactic acidosis


Status:  Acute


(3) DANIEL (acute kidney injury)


Status:  Acute


(4) ATN (acute tubular necrosis)


Status:  Acute


(5) Atrial fibrillation with RVR


Status:  Acute


(6) Hyponatremia


Status:  Acute


(7) Hypomagnesemia


Status:  Acute











KEITH WHITE MD              Apr 18, 2021 13:47

## 2021-04-18 NOTE — DIAGNOSTIC IMAGING REPORT
EXAMINATION: Portable erect AP chest at 3:05 AM



INDICATION: Sepsis, respiratory distress



The appearance of the chest has improved somewhat since the prior

exam of 04/17/2021 as the central pulmonary vascularity and

interstitial densities in both lungs do not seem as prominent.

There still appears to be an element of at least mild pulmonary

congestion present with associated atelectasis/infiltrate in each

lung base and small effusions. The heart is stable in size. The

mediastinum is not widened. The osseous structures are intact.

The central venous catheter on the left is stable in position.



IMPRESSION: The appearance of the chest has improved as there is

less pulmonary congestion than noted on the previous exam. A

follow-up study would be recommended for continued evaluation.



Dictated by: 



  Dictated on workstation # [id number]

## 2021-04-18 NOTE — CONSULTATION-CARDIOLOGY
HPI-Cardiology


Cardiology Consultation:


Date of Consultation


4/18/21


Date of Admission





Attending Physician


Yasmine Castañeda MD


Admitting Physician


Wilbert Dean MD


Consulting Physician


DANYELLE REEDER MD





HPI:


Time Seen by a Provider:  13:00


Chief Complaint:


Atrial fibrillation with RVR


This is a 86-year-old lady who presented from [facility].  She 

presented with confusion and was diagnosed with septic shock due to UTI.  She 

has history of colitis, frequent falls, hip fracture and debility.  She was also

found to have atrial fibrillation with RVR overnight, therefore started on 

Cardizem infusion and transferred to the ICU.  On my examination this morning 

the patient is very comfortable and not complaining of any cardiac complaints.  

She denies active smoking.  Pertinent family history is negative.





Review of Systems-Cardiology


Review of Systems


Constitutional:  As described under HPI; No As described under HPI, No no 

symptoms reported, No chills, No fever, No lightheadedness


Eyes:  No As described under HPI, No no symptoms reported, No blindness, No 

blurred vision, No contact lenses, No drainage, No decreased acuity, No foreign 

body sensation, No pain, No vision change


Ears/Nose/Throat:  No As described under HPI, No no symptoms reported, No 

jony hearing loss, No ear discharge, No ear pain, No nasal drainage, No 

ulcerations


Respiratory:  No no symptoms reported; As described under HPI; No As described 

under HPI, No cough, No orthopnea, No shortness of breath, No SOB with excertion


Cardiovascular:  No no symptoms reported; As described under HPI; No As 

described under HPI, No chest pain, No edema, No irregular heart rate, No 

lightheadedness, No palpitations


Gastrointestinal:  No no symptoms reported, No As described under HPI, No 

abdomen distended, No abdominal pain, No blood streaked bowels, No constipation,

No diarrhea, No nausea, No vomiting, No stool coloration changes


Genitourinary:  No As described under HPI, No burning, No dysuria, No discharge,

No frequency, No flank pain, No hematuria, No urgency


Pregnant:  Yes


Pregnant:  No


Skin:  No rash, No skin related problems, No ulcerations


Psychiatric/Neurological:  No anxiety, No depression, No seizure, No focal 

weakness, No syncope


Hematologic:  No bleeding abnormalities





All Other Systems Reviewed


Negative Unless Noted:  Yes





PMH-Social-Family Hx


Patient Social History


Smoking Status:  Never a Smoker


2nd Hand Smoke Exposure:  No


Have you traveled recently?:  No


Alcohol Use?:  No


Pt feels they are or have been:  No





Immunizations Up To Date


Date of Influenza Vaccine:  Oct 23, 2020





Past Medical History


PMH


As described under Assessment.





Allergies and Home Medications


Allergies


Coded Allergies:  


     nystatin (Verified  Allergy, Mild, HIVES, 7/5/17)


     Sulfa (Sulfonamide Antibiotics) (Verified  Allergy, Unknown, 8/14/19)


     amoxicillin (Verified  Allergy, Unknown, 7/21/17)


     balsalazide (Verified  Allergy, Unknown, 10/7/20)


     chlorhexidine (Verified  Allergy, Unknown, HIVES, 7/5/17)


     clindamycin (Verified  Allergy, Unknown, 7/21/17)


     dicyclomine (Verified  Allergy, Unknown, 8/14/19)


     mesalamine (Verified  Allergy, Unknown, 10/7/20)


     sulfamethoxazole (Unverified  Allergy, Unknown, 10/16/14)


     trimethoprim (Unverified  Allergy, Unknown, 10/16/14)





Home Medications


Ascorbic Acid/Collagen Hydr 1 Each Capsule, 1 EACH PO DAILY, (Reported)


Bacillus Coagulans 1 Each Tab.chew, 1 EACH PO DAILY, (Reported)


Budesonide 3 Mg Capdr...er, 3 MG PO PC


   Prescribed by: MANNY SALGADO on 8/16/19 1110


Hydrocodone/Acetaminophen 1 Each Tablet, 0.5-1 TAB PO Q4H PRN for PAIN-MODERATE 

(5-7)


   Prescribed by: SYLVIE MOORE on 10/7/20 1816


Krill/Om3/Dha/Epa/Om6/Lip/Astx 1 Each Capsule, 1 EACH PO DAILY, (Reported)


L.acidoph & Paracasei,B.lactis 1 Each Capsule, 1 EACH PO TID


   Prescribed by: SYLVIE MOORE on 10/7/20 1817


Loperamide HCl 2 Mg Tablet, 2 MG PO TID PRN for DIAPER CHANGE, (Reported)


Loratadine 10 Mg Tablet, 10 MG PO DAILY PRN for ALLERGIES, (Reported)


Loratadine 10 Mg Tablet, 10 MG PO DAILY, (Reported)





Patient Home Medication List


Home Medication List Reviewed:  Yes





Physical Exam-Cardiology


Physical Exam


Vital Signs/I&O











 4/18/21 4/18/21 4/18/21 4/18/21





 03:00 04:00 05:00 05:00


 


Pulse 66 69  68


 


Resp 18 18  16


 


B/P (MAP) 108/59 (75) 117/57 (77)  105/59 (74)


 


Pulse Ox 91 92 96 93


 


O2 Delivery Nasal Cannula Nasal Cannula Room Air Nasal Cannula


 


O2 Flow Rate 2.00 2.00  2.00





 4/18/21 4/18/21 4/18/21 4/18/21





 06:00 07:00 07:00 07:17


 


Temp    36.9


 


Pulse 74 70 68 


 


Resp 14  17 


 


B/P (MAP) 112/55 (74)  116/52 (73) 


 


Pulse Ox 93  93 


 


O2 Delivery Nasal Cannula  Nasal Cannula 


 


O2 Flow Rate 2.00  2.00 


 


    





 4/18/21 4/18/21 4/18/21 4/18/21





 07:27 08:00 08:27 08:30


 


Pulse  72 70 


 


Resp  17  


 


B/P (MAP)  124/57 (79) 97/55 


 


Pulse Ox  92  96


 


O2 Delivery  Nasal Cannula  Room Air


 


O2 Flow Rate 2.00 2.00  





 4/18/21 4/18/21 4/18/21 4/18/21





 09:00 10:00 11:00 11:09


 


Temp    37.0


 


Pulse 71 72 77 


 


Resp 15 13 14 


 


B/P (MAP) 110/88 (95) 122/56 (78) 130/60 (83) 


 


O2 Delivery Nasal Cannula Nasal Cannula Nasal Cannula 


 


O2 Flow Rate 2.00 2.00 2.00 














 4/18/21





 00:00


 


Intake Total 1000 ml


 


Output Total 600 ml


 


Balance 400 ml





Capillary Refill : Less Than 3 Seconds


Constitutional:  appears stated age, AAO x 3; No apparent distress; well-

developed, well-nourished


HEENT:  PERRL; No discharge; hearing is well preserved, oral hygience is good; 

No ulceration, No xanthelasmas are seen


Neck:  No carotid bruit; carotid pulses are 2 + bilaterally


Respiratory:  chest is bilaterally symmetric, lungs clear to auscultation


Cardiovascular:  regular rate-rhythm, S1 and S2


Gastrointestinal:  soft, audible bowel sounds; No spleenomegaly


Rectal:  deferred


Extremities:  No clubbing, No cyanosis; no lower extremity edema bilateral; No 

significant edema


Neurologic/Psychiatric:  no motor/sensory deficits, alert, normal mood/affect, 

oriented x 3, power is 5/5 both on sides


Skin:  normal color; No rash, No ulcerations





Data Review


Labs


Laboratory Tests


4/17/21 17:44: Lactic Acid Level 3.99*H


4/17/21 20:10: Lactic Acid Level 1.86


4/18/21 02:20: 


White Blood Count 15.1H, Red Blood Count 3.60L, Hemoglobin 11.5, Hematocrit 34L,

Mean Corpuscular Volume 94, Mean Corpuscular Hemoglobin 32, Mean Corpuscular 

Hemoglobin Concent 34, Red Cell Distribution Width 14.2, Platelet Count 176, 

Mean Platelet Volume 9.0, Immature Granulocyte % (Auto) 1, Neutrophils (%) 

(Auto) 80H, Lymphocytes (%) (Auto) 3L, Monocytes (%) (Auto) 3, Eosinophils (%) 

(Auto) 14H, Basophils (%) (Auto) 0, Neutrophils # (Auto) 12.1H, Lymphocytes # 

(Auto) 0.4L, Monocytes # (Auto) 0.4, Eosinophils # (Auto) 2.1H, Basophils # 

(Auto) 0.0, Immature Granulocyte # (Auto) 0.1, Sodium Level 129L, Potassium 

Level 3.9, Chloride Level 102, Carbon Dioxide Level 17L, Anion Gap 10, Blood 

Urea Nitrogen 30H, Creatinine 2.06H, Estimat Glomerular Filtration Rate 23, 

BUN/Creatinine Ratio 15, Glucose Level 132H, Calcium Level 7.4L, Phosphorus 

Level 2.9, Magnesium Level 1.2L





Microbiology


4/17/21 Urine Culture - Preliminary, Resulted


          Escherichia coli








ECG Impression


ECG


Initial ECG Rhythm:  Normal Sinus


Initial ECG Impression:  Normal





A/P-Cardiology


Assessment/Admission Diagnosis


Septic shock,


UTI,


Debility,


Atrial fibrillation with RVR,


Frequent falls





Plan


Defer treatment of UTI sepsis, debility to the primary team.





Atrial fibrillation with RVR, was started on Cardizem infusion.  Early morning 

she converted to sinus rhythm.  She was given Lovenox yesterday.  In my opinion 

she is a poor candidate for oral anticoagulation therapy since she has had 

recurrent falls in the last few weeks even in the nursing home resulting in 

fracture.  Therefore continue low-dose DVT prophylaxis Lovenox during the 

hospitalization.  Patient is currently in sinus rhythm therefore Cardizem has 

been held.








Thank you for your consultation. Please call me if you have any questions.








DARY Reeder MD, FACP, FACC, FSCAI, FHRS, CCDS


Interventional Cardiology


Cardiac Electrophysiology


Vascular Medicine and Endovascular Interventions











DANYELLE REEDER MD             Apr 18, 2021 14:14

## 2021-04-19 VITALS — DIASTOLIC BLOOD PRESSURE: 49 MMHG | SYSTOLIC BLOOD PRESSURE: 94 MMHG

## 2021-04-19 VITALS — SYSTOLIC BLOOD PRESSURE: 104 MMHG | DIASTOLIC BLOOD PRESSURE: 51 MMHG

## 2021-04-19 VITALS — DIASTOLIC BLOOD PRESSURE: 51 MMHG | SYSTOLIC BLOOD PRESSURE: 93 MMHG

## 2021-04-19 VITALS — DIASTOLIC BLOOD PRESSURE: 52 MMHG | SYSTOLIC BLOOD PRESSURE: 106 MMHG

## 2021-04-19 VITALS — SYSTOLIC BLOOD PRESSURE: 88 MMHG | DIASTOLIC BLOOD PRESSURE: 46 MMHG

## 2021-04-19 VITALS — SYSTOLIC BLOOD PRESSURE: 95 MMHG | DIASTOLIC BLOOD PRESSURE: 70 MMHG

## 2021-04-19 VITALS — SYSTOLIC BLOOD PRESSURE: 99 MMHG | DIASTOLIC BLOOD PRESSURE: 54 MMHG

## 2021-04-19 VITALS — DIASTOLIC BLOOD PRESSURE: 60 MMHG | SYSTOLIC BLOOD PRESSURE: 104 MMHG

## 2021-04-19 VITALS — SYSTOLIC BLOOD PRESSURE: 92 MMHG | DIASTOLIC BLOOD PRESSURE: 43 MMHG

## 2021-04-19 VITALS — DIASTOLIC BLOOD PRESSURE: 57 MMHG | SYSTOLIC BLOOD PRESSURE: 114 MMHG

## 2021-04-19 VITALS — SYSTOLIC BLOOD PRESSURE: 108 MMHG | DIASTOLIC BLOOD PRESSURE: 87 MMHG

## 2021-04-19 VITALS — SYSTOLIC BLOOD PRESSURE: 104 MMHG | DIASTOLIC BLOOD PRESSURE: 54 MMHG

## 2021-04-19 VITALS — DIASTOLIC BLOOD PRESSURE: 49 MMHG | SYSTOLIC BLOOD PRESSURE: 93 MMHG

## 2021-04-19 VITALS — DIASTOLIC BLOOD PRESSURE: 55 MMHG | SYSTOLIC BLOOD PRESSURE: 100 MMHG

## 2021-04-19 VITALS — SYSTOLIC BLOOD PRESSURE: 115 MMHG | DIASTOLIC BLOOD PRESSURE: 57 MMHG

## 2021-04-19 VITALS — DIASTOLIC BLOOD PRESSURE: 50 MMHG | SYSTOLIC BLOOD PRESSURE: 107 MMHG

## 2021-04-19 VITALS — DIASTOLIC BLOOD PRESSURE: 50 MMHG | SYSTOLIC BLOOD PRESSURE: 94 MMHG

## 2021-04-19 VITALS — DIASTOLIC BLOOD PRESSURE: 92 MMHG | SYSTOLIC BLOOD PRESSURE: 109 MMHG

## 2021-04-19 VITALS — DIASTOLIC BLOOD PRESSURE: 60 MMHG | SYSTOLIC BLOOD PRESSURE: 94 MMHG

## 2021-04-19 VITALS — SYSTOLIC BLOOD PRESSURE: 90 MMHG | DIASTOLIC BLOOD PRESSURE: 45 MMHG

## 2021-04-19 VITALS — SYSTOLIC BLOOD PRESSURE: 95 MMHG | DIASTOLIC BLOOD PRESSURE: 46 MMHG

## 2021-04-19 VITALS — DIASTOLIC BLOOD PRESSURE: 51 MMHG | SYSTOLIC BLOOD PRESSURE: 104 MMHG

## 2021-04-19 VITALS — DIASTOLIC BLOOD PRESSURE: 57 MMHG | SYSTOLIC BLOOD PRESSURE: 108 MMHG

## 2021-04-19 VITALS — DIASTOLIC BLOOD PRESSURE: 92 MMHG | SYSTOLIC BLOOD PRESSURE: 101 MMHG

## 2021-04-19 LAB
BASOPHILS # BLD AUTO: 0 10^3/UL (ref 0–0.1)
BASOPHILS NFR BLD AUTO: 0 % (ref 0–10)
BUN/CREAT SERPL: 15
CALCIUM SERPL-MCNC: 7.4 MG/DL (ref 8.5–10.1)
CHLORIDE SERPL-SCNC: 103 MMOL/L (ref 98–107)
CO2 SERPL-SCNC: 16 MMOL/L (ref 21–32)
CREAT SERPL-MCNC: 2.34 MG/DL (ref 0.6–1.3)
EOSINOPHIL # BLD AUTO: 1.8 10^3/UL (ref 0–0.3)
EOSINOPHIL NFR BLD AUTO: 20 % (ref 0–10)
GFR SERPLBLD BASED ON 1.73 SQ M-ARVRAT: 20 ML/MIN
GLUCOSE SERPL-MCNC: 104 MG/DL (ref 70–105)
HCT VFR BLD CALC: 31 % (ref 35–52)
HGB BLD-MCNC: 10.1 G/DL (ref 11.5–16)
LYMPHOCYTES # BLD AUTO: 0.4 10^3/UL (ref 1–4)
LYMPHOCYTES NFR BLD AUTO: 5 % (ref 12–44)
MAGNESIUM SERPL-MCNC: 2.5 MG/DL (ref 1.6–2.4)
MANUAL DIFFERENTIAL PERFORMED BLD QL: NO
MCH RBC QN AUTO: 31 PG (ref 25–34)
MCHC RBC AUTO-ENTMCNC: 33 G/DL (ref 32–36)
MCV RBC AUTO: 94 FL (ref 80–99)
MONOCYTES # BLD AUTO: 0.3 10^3/UL (ref 0–1)
MONOCYTES NFR BLD AUTO: 4 % (ref 0–12)
NEUTROPHILS # BLD AUTO: 6.4 10^3/UL (ref 1.8–7.8)
NEUTROPHILS NFR BLD AUTO: 71 % (ref 42–75)
PHOSPHATE SERPL-MCNC: 2.7 MG/DL (ref 2.3–4.7)
PLATELET # BLD: 132 10^3/UL (ref 130–400)
PMV BLD AUTO: 9.5 FL (ref 9–12.2)
POTASSIUM SERPL-SCNC: 3.9 MMOL/L (ref 3.6–5)
SODIUM SERPL-SCNC: 135 MMOL/L (ref 135–145)
WBC # BLD AUTO: 9 10^3/UL (ref 4.3–11)

## 2021-04-19 RX ADMIN — Medication SCH MLS/HR: at 22:14

## 2021-04-19 RX ADMIN — SODIUM CHLORIDE, SODIUM LACTATE, POTASSIUM CHLORIDE, AND CALCIUM CHLORIDE SCH MLS/HR: 600; 310; 30; 20 INJECTION, SOLUTION INTRAVENOUS at 17:31

## 2021-04-19 RX ADMIN — POTASSIUM CHLORIDE SCH MLS/HR: 200 INJECTION, SOLUTION INTRAVENOUS at 06:24

## 2021-04-19 RX ADMIN — ENOXAPARIN SODIUM SCH MG: 100 INJECTION SUBCUTANEOUS at 10:26

## 2021-04-19 RX ADMIN — POTASSIUM CHLORIDE SCH MEQ: 1500 TABLET, EXTENDED RELEASE ORAL at 06:24

## 2021-04-19 RX ADMIN — MAGNESIUM SULFATE IN DEXTROSE SCH MLS/HR: 10 INJECTION, SOLUTION INTRAVENOUS at 06:24

## 2021-04-19 RX ADMIN — SODIUM CHLORIDE, SODIUM LACTATE, POTASSIUM CHLORIDE, AND CALCIUM CHLORIDE SCH MLS/HR: 600; 310; 30; 20 INJECTION, SOLUTION INTRAVENOUS at 20:25

## 2021-04-19 RX ADMIN — SODIUM CHLORIDE SCH MLS/HR: 900 INJECTION INTRAVENOUS at 14:53

## 2021-04-19 RX ADMIN — SODIUM CHLORIDE SCH MLS/HR: 900 INJECTION INTRAVENOUS at 03:20

## 2021-04-19 RX ADMIN — SODIUM CHLORIDE, SODIUM LACTATE, POTASSIUM CHLORIDE, AND CALCIUM CHLORIDE SCH MLS/HR: 600; 310; 30; 20 INJECTION, SOLUTION INTRAVENOUS at 09:30

## 2021-04-19 NOTE — DIAGNOSTIC IMAGING REPORT
INDICATION: Atrial fibrillation and sepsis.



TIME OF EXAM: 2:58 AM



Correlation is made with prior exam from one day earlier.



FINDINGS: Heart is enlarged and stable. Bilateral interstitial

and airspace infiltrates have increased since prior exam, perhaps

owing to worsening congestive failure. There are small bilateral

effusions. There is no pneumothorax detected. Left central line

is tip overlying SVC.



IMPRESSION: Worsening bilateral infiltrates suggestive of

worsening congestive failure when compared to examination one day

earlier.



Dictated by: 



  Dictated on workstation # [id number]

## 2021-04-19 NOTE — PULMONARY CONSULTATION
History of Present Illness


History of Present Illness


Date Seen by Provider:  Apr 19, 2021


Time Seen by Provider:  04:32


Date of Admission





History of Present Illness


86-year-old female with history of colitis, hip fracture, seasonal allergies, 

debility presented to ED from [facility] secondary to worsening confusion.  She denies 

any fevers or chills.  She denies any shortness of breath or cough.  She denies 

any dysuria or urinary symptoms.  She denies any abdominal pain, nausea, 

vomiting, or diarrhea.





Allergies and Home Medications


Allergies


Coded Allergies:  


     nystatin (Verified  Allergy, Mild, HIVES, 7/5/17)


     Sulfa (Sulfonamide Antibiotics) (Verified  Allergy, Unknown, 8/14/19)


     amoxicillin (Verified  Allergy, Unknown, 7/21/17)


     balsalazide (Verified  Allergy, Unknown, 10/7/20)


     chlorhexidine (Verified  Allergy, Unknown, HIVES, 7/5/17)


     clindamycin (Verified  Allergy, Unknown, 7/21/17)


     dicyclomine (Verified  Allergy, Unknown, 8/14/19)


     mesalamine (Verified  Allergy, Unknown, 10/7/20)


     sulfamethoxazole (Unverified  Allergy, Unknown, 10/16/14)


     trimethoprim (Unverified  Allergy, Unknown, 10/16/14)





Home Medications


Acetaminophen 500 Mg Tablet, 500 MG PO Q6H PRN for PAIN-MILD (1-4), (Reported)


Cholecalciferol (Vitamin D3) 25 Mcg Capsule, 25 MCG PO DAILY, (Reported)


Diclofenac Sodium 100 Gm Gel..gram., 4 GM TP QID, (Reported)


   APPLY TO KNEES 


Ferrous Sulfate 325 Mg Tablet, 325 MG PO DAILY, (Reported)


Hydrocodone/Acetaminophen 1 Each Tablet, 0.5-1 TAB PO Q4H PRN for PAIN-MODERATE 

(5-7), (Reported)


Loperamide HCl 2 Mg Tablet, 2 MG PO BID, (Reported)


Loperamide HCl 2 Mg Tablet, 2 MG PO BID PRN for DIARRHEA, (Reported)


Loratadine 10 Mg Tablet, 10 MG PO DAILY PRN for ALLERGY SYMPTOMS, (Reported)


Nitrofurantoin Monohyd/M-Cryst 100 Mg Capsule, 1 EA PO BID, (Reported)


   FILLED 04- #10/5 DAY SUPPLY 


Simethicone 80 Mg Tab.chew, 80 MG PO TID, (Reported)


Simethicone 80 Mg Tab.chew, 80 MG PO HS PRN for GAS, (Reported)





Past Medical-Social-Family Hx


Patient Social History


Alcohol Use:  Denies Use


Smoking Status:  Never a Smoker


2nd Hand Smoke Exposure:  No


Recent Infectious Disease Expo:  No


Recent Hopitalizations:  Yes


Have you traveled recently?:  No


Alcohol Use?:  No





Immunizations Up To Date


Date of Influenza Vaccine:  Oct 23, 2020





Seasonal Allergies


Seasonal Allergies:  Yes





Past Medical History


Surgeries:  Yes (RIGHT KNEE SURGERY (SCOPE)  R hip fx)


Adenoidectomy, Breast


Respiratory:  No


Pneumonia


Cardiac:  Yes (dependent edema of legs)


Chronic Edema/Swelling


Neurological:  No


Genitourinary:  No


Gastrointestinal:  Yes (FISSURE)


Colitis, Hemorrhoids, Chronic Diarrhea


Musculoskeletal:  Yes (SPRAINED RT ANKLE 3 YRS. AGO AND WEARS BRACE TO RIGHT 

ANKLE.  KNEE SCOPED)


Chronic Back Pain


Endocrine:  No


HEENT:  Yes


Hearing Impairment:  Hard of Hearing


Cancer:  No


Psychosocial:  No


Integumentary:  No


Blood Disorders:  No





Family Medical History


No Pertinent Family Hx





Noncontributory





Review of Systems


Time Seen by Provider:  04:32





Sepsis Event


Evaluation


Height, Weight, BMI


Height: 5'6.00"


Weight: 97lbs. 0.0oz. 43.913046sm; 19.83 BMI


Method:Stated





Exam


Exam





Vital Signs








  Date Time  Temp Pulse Resp B/P (MAP) Pulse Ox O2 Delivery O2 Flow Rate FiO2


 


4/19/21 01:05  71      


 


4/19/21 01:00  72 17 104/51 (68) 93 Nasal Cannula 2.00 


 


4/19/21 01:00      Nasal Cannula  


 


4/19/21 00:00  70 16 99/54 (69) 95 Nasal Cannula 2.00 


 


4/18/21 23:00  74 17 99/51 (67) 89 Nasal Cannula 2.00 


 


4/18/21 22:00  72 30 99/51 (67) 92 Nasal Cannula 2.00 


 


4/18/21 21:00      Nasal Cannula  


 


4/18/21 21:00  77 21 103/51 (68) 92 Nasal Cannula 2.00 


 


4/18/21 20:00  71 15 111/55 (73) 93 Nasal Cannula 2.00 


 


4/18/21 19:57 37.1       


 


4/18/21 19:00  73 18 113/52 (72) 91 Nasal Cannula 2.00 


 


4/18/21 19:00  70      


 


4/18/21 18:15  79 15 122/59 (80) 93 Nasal Cannula 2.00 


 


4/18/21 17:00  81 25 110/54 (72) 90 Nasal Cannula 2.00 


 


4/18/21 16:30      Nasal Cannula  


 


4/18/21 16:25    116/62 (80)    


 


4/18/21 16:00  68 18 81/67 (72) 90 Nasal Cannula 2.00 


 


4/18/21 15:45 37.1       


 


4/18/21 15:00  72 21 117/57 (77)  Nasal Cannula 2.00 


 


4/18/21 14:00  82 10 110/64 (79)  Nasal Cannula 2.00 


 


4/18/21 13:00  71      


 


4/18/21 13:00  70 18 107/52 (70)  Nasal Cannula 2.00 


 


4/18/21 12:30      Nasal Cannula  


 


4/18/21 12:00  69 17 117/53 (74)  Nasal Cannula 2.00 


 


4/18/21 11:09 37.0       


 


4/18/21 11:00  77 14 130/60 (83)  Nasal Cannula 2.00 


 


4/18/21 10:00  72 13 122/56 (78)  Nasal Cannula 2.00 


 


4/18/21 09:00  71 15 110/88 (95)  Nasal Cannula 2.00 


 


4/18/21 08:30     96 Room Air  


 


4/18/21 08:27  70  97/55    


 


4/18/21 08:00  72 17 124/57 (79) 92 Nasal Cannula 2.00 


 


4/18/21 07:27       2.00 


 


4/18/21 07:17 36.9       


 


4/18/21 07:00  68 17 116/52 (73) 93 Nasal Cannula 2.00 


 


4/18/21 07:00  70      


 


4/18/21 06:00  74 14 112/55 (74) 93 Nasal Cannula 2.00 


 


4/18/21 05:00  68 16 105/59 (74) 93 Nasal Cannula 2.00 


 


4/18/21 05:00     96 Room Air  














I & O 


 


 4/19/21





 07:00


 


Intake Total 1260 ml


 


Output Total 900 ml


 


Balance 360 ml








Height & Weight


Height: 5'6.00"


Weight: 97lbs. 0.0oz. 43.992271tu; 19.83 BMI


Method:Stated


General Appearance:  No Apparent Distress, Chronically ill, Thin


HEENT:  PERRL/EOMI, Pharynx Normal


Neck:  Normal Inspection, Supple


Respiratory:  Lungs Clear, Normal Breath Sounds, No Respiratory Distress


Cardiovascular:  Regular Rate, Rhythm, No Edema, No Murmur


Capillary Refill:  Less Than 3 Seconds


Extremity:  Normal Inspection, Non Tender, No Pedal Edema


Neurologic/Psychiatric:  Alert, Oriented x3, No Motor/Sensory Deficits, Normal 

Mood/Affect


Skin:  Warm/Dry, Erythema


Lymphatic:  No Adenopathy





Results


Lab


Laboratory Tests


4/17/21 11:38








4/18/21 02:20








4/19/21 03:20











Assessment/Plan


Assessment/Plan


Septic shock due to UTI with Ecoli 


   -LR at 100 currently 


   -Currently on Merrem 


Hypotension- improved 


   -IVF 


Lactic acidosis-- improved 


Acute renal failure 


   -Monitor 


Atrial fibrillation with RVR


   Cardiology consulted


    Cardizem gtt d/c'd 


   Lovenox


Hyponatremia











SHANNA VIRGEN DO              Apr 19, 2021 04:37

## 2021-04-19 NOTE — PROGRESS NOTE - CARDIOLOGY
Cardiology SOAP Progress Note


Subjective:


Sitting up in bed eating morning meal


No c/o CP, SOB or palpitations


States she feels better this morning





Objective:


I&O/Vital Signs











 4/19/21 4/19/21 4/19/21 4/20/21





 22:00 23:00 23:24 00:00


 


Temp   37.2 


 


Pulse 74 67  78


 


Resp 20 18  18


 


B/P (MAP) 109/92 (98) 104/51 (68)  104/47 (66)


 


Pulse Ox 93 87  95


 


O2 Delivery Nasal Cannula Nasal Cannula Nasal Cannula Nasal Cannula


 


O2 Flow Rate 2.00 2.00 2.00 2.00


 


    





 4/20/21 4/20/21 4/20/21 4/20/21





 01:00 01:00 01:00 02:00


 


Pulse 68  68 81


 


Resp 17   20


 


B/P (MAP) 95/48 (64)   96/49 (65)


 


Pulse Ox 92   92


 


O2 Delivery Nasal Cannula Nasal Cannula  Nasal Cannula


 


O2 Flow Rate 2.00 2.00  2.00





 4/20/21 4/20/21 4/20/21 4/20/21





 03:00 04:00 05:00 05:00


 


Pulse 71 58  63


 


Resp 20 13  18


 


B/P (MAP) 118/54 (75) 94/50 (65)  103/57 (72)


 


Pulse Ox  92  93


 


O2 Delivery Nasal Cannula Nasal Cannula Nasal Cannula Nasal Cannula


 


O2 Flow Rate 2.00 2.00 2.00 2.00





 4/20/21 4/20/21 4/20/21 4/20/21





 06:00 06:44 07:48 08:00


 


Temp   36.6 


 


Pulse 59 65  72


 


Resp 17   27


 


B/P (MAP) 96/53 (67)   


 


Pulse Ox 95   87


 


O2 Delivery Nasal Cannula   Nasal Cannula


 


O2 Flow Rate 2.00   2.00














 4/20/21





 00:00


 


Intake Total 1520 ml


 


Output Total 450 ml


 


Balance 1070 ml








Weight (Pounds):  97


Weight (Ounces):  0.0


Weight (Calculated Kilograms):  43.200682


Constitutional:  appears stated age, AAO x 3; No apparent distress; well-

developed, well-nourished


Respiratory:  chest is bilaterally symmetric, lungs clear to auscultation


Cardiovascular:  regular rate-rhythm, S1 and S2


Gastrointestional:  No tender; soft, audible bowel sounds


Extremities:  no lower extremity edema bilateral


Neurologic/Psychiatric:  grossly intact (moves all extremities)


Skin:  No ulcerations, No rash on exposed areas





Results/Procedures:


Labs


Laboratory Tests


4/19/21 10:30: Lactic Acid Level 1.68


4/20/21 02:30: 


White Blood Count 7.9, Red Blood Count 3.18L, Hemoglobin 9.8L, Hematocrit 31L, 

Mean Corpuscular Volume 96, Mean Corpuscular Hemoglobin 31, Mean Corpuscular 

Hemoglobin Concent 32, Red Cell Distribution Width 14.6H, Platelet Count 125L, 

Mean Platelet Volume 9.4, Immature Granulocyte % (Auto) 0, Neutrophils (%) 

(Auto) 60, Lymphocytes (%) (Auto) 8L, Monocytes (%) (Auto) 7, Eosinophils (%) 

(Auto) 24H, Basophils (%) (Auto) 0, Neutrophils # (Auto) 4.7, Lymphocytes # 

(Auto) 0.6L, Monocytes # (Auto) 0.6, Eosinophils # (Auto) 1.9H, Basophils # 

(Auto) 0.0, Immature Granulocyte # (Auto) 0.0, Neutrophils % (Manual) 63, 

Lymphocytes % (Manual) 8, Monocytes % (Manual) 8, Eosinophils % (Manual) 16, 

Band Neutrophils 5, Blood Morphology Comment NORMAL, Sodium Level 135, Potassium

Level 4.2, Chloride Level 108H, Carbon Dioxide Level 18L, Anion Gap 9, Blood 

Urea Nitrogen 33H, Creatinine 2.56H, Estimat Glomerular Filtration Rate 18, 

BUN/Creatinine Ratio 13, Glucose Level 99, Calcium Level 7.7L, Phosphorus Level 

2.6, Magnesium Level 2.2





Microbiology


4/17/21 MRSA Screen - Final, Complete


          MRSA not isolated


4/17/21 Blood Culture - Preliminary, Resulted


          No growth


4/17/21 Urine Culture - Final, Complete


          Escherichia coli








A/P:


Assessment:


UTI with sepsis - management per ICU/medical services





Atrial fibrillation with RVR first diagnosed on 4-18-21 - Converted on Cardizem 

gtt to SR - maintaining SR without any rate controlling agents





Per Dr. Reeder - she is a poor candidate for oral anticoagulation therapy since 

she has had recurrent falls in the last few weeks even in the nursing home 

resulting in fracture.  Therefore continue low-dose DVT prophylaxis Lovenox 

during the hospitalization. 





Hypotensive requiring pressor support likely secondary to urosepsis


Plan:


Management of UTI with sepsis is per medical/ICU services


Maintaining SR with controlled rate following Cardizem gtt, start oral low dose 

Cardizem


Not felt to be a good candidate for OAC d/t history of frequent falls resulting 

in fracture in the past


Advise ASA 81mg, it will not provide protection as good as OAC, but will provide

some protection


Records from Dr. Reeder have been reviewed


Monitor lab closely


Replace electrolytes as indicated











LORENA ALFARO           Apr 19, 2021 08:38

## 2021-04-19 NOTE — PROGRESS NOTE - HOSPITALIST
Subjective


HPI/CC On Admission


Date Seen by Provider:  Apr 19, 2021


Time Seen by Provider:  08:17


Zeenat Larry is an 86-year-old female with history of colitis, hip fracture, 

seasonal allergies, debility currently at [facility], who presented 

with confusion.  She denies any fevers or chills.  She denies any shortness of 

breath or cough.  She denies any dysuria or urinary symptoms.  She denies any 

abdominal pain, nausea, vomiting, or diarrhea.  She says she had no idea that 

she was sick.  She is oriented to person, place, and date at the time of my 

examination.


Subjective/Events-last exam


Pt reports feeling well. States she never actually felt poorly though. Only 

concern this morning is that she is still waiting on her breakfast. Discussed BP

still a little on the low side so will want to monitor that closely.





Focused Exam


Lactate Level


4/17/21 14:02: Lactic Acid Level 5.65*H


4/17/21 17:44: Lactic Acid Level 3.99*H


4/17/21 20:10: Lactic Acid Level 1.86








Objective


Exam


Vital Signs





Vital Signs








  Date Time  Temp Pulse Resp B/P (MAP) Pulse Ox O2 Delivery O2 Flow Rate FiO2


 


4/19/21 07:06     93  1.50 


 


4/19/21 06:00  69 16 90/45 (60)  Nasal Cannula  


 


4/18/21 19:57 37.1       





Capillary Refill : Less Than 3 Seconds


General Appearance:  No Apparent Distress, WD/WN


Respiratory:  Lungs Clear, No Respiratory Distress


Cardiovascular:  Regular Rate, Rhythm, No Murmur


Neurologic/Psychiatric:  Alert, Oriented x3





Results/Procedures


Lab


Laboratory Tests


4/19/21 03:20








Patient resulted labs reviewed.


Imaging:  Reviewed Imaging Report





Assessment/Plan


Assessment and Plan


Assess & Plan/Chief Complaint


Septic shock due to UTI


Lactic acidosis- resolved


Acute kidney injury likely due to ATN


Advanced age


Poor prognosis


   Creatinine still around 2, up a bit today to 2.34 baseline <1


   Lactic acid >4, improved and normalized with fluids


   Central line placed by surgery, pressors off at the monitor but low threshold

for resumption for MAP <60


   Blood cultures NGTD


   UA consistent with UTI


   Urine culture with E coli, sensitivities pending


   Continue Merrem


   IV fluids


   Discussed code status, patient requests full code





Atrial fibrillation with RVR


   Cardiology consulted, appreciate assistance


   Was on cardizem gtt but converted and rate controlled, monitor on telemetry


   Lovenox





Hyponatremia


Hypomagnesemia


   Monitor and correct electrolytes as needed





DVT ppx: Lovenox





Critical Care


Critically Ill Patient











KHLOE NICOLE MD              Apr 19, 2021 08:22

## 2021-04-19 NOTE — PROGRESS NOTE - CARDIOLOGY
Cardiology SOAP Progress Note


Subjective:


No cp or palp or syncope


Gen malaise


No n/v/d





Objective:


I&O/Vital Signs











 4/19/21 4/19/21 4/19/21 4/19/21





 07:00 07:00 07:06 08:00


 


Temp    36.2


 


Pulse 71 72  


 


Resp 15   


 


B/P (MAP) 93/49 (64)   


 


Pulse Ox 93  93 


 


O2 Delivery Nasal Cannula   


 


O2 Flow Rate 2.00  1.50 


 


    





 4/19/21 4/19/21 4/19/21 4/19/21





 08:00 09:00 09:00 10:00


 


Pulse 67  70 68


 


Resp 15  18 17


 


B/P (MAP) 94/60 (71)  92/43 (59) 93/51 (65)


 


Pulse Ox 93 94 89 89


 


O2 Delivery Nasal Cannula Nasal Cannula Nasal Cannula Nasal Cannula


 


O2 Flow Rate 2.00 1.50 2.00 2.00





 4/19/21 4/19/21 4/19/21 4/19/21





 11:00 12:00 13:00 13:00


 


Temp    36.7


 


Pulse 67 74 70 


 


Resp 19 22  


 


B/P (MAP) 100/55 (70) 115/57 (76)  


 


Pulse Ox 88 91  


 


O2 Delivery Nasal Cannula Nasal Cannula  


 


O2 Flow Rate 2.00 2.00  


 


    





 4/19/21 4/19/21 4/19/21 4/19/21





 13:00 13:30 14:00 15:00


 


Pulse 68  71 75


 


Resp 18  18 19


 


B/P (MAP) 106/52 (70)  107/50 (69) 108/57 (74)


 


Pulse Ox 88 94 86 94


 


O2 Delivery Nasal Cannula Nasal Cannula Nasal Cannula Nasal Cannula


 


O2 Flow Rate 2.00 1.50 2.00 2.00





 4/19/21 4/19/21 4/19/21 4/19/21





 16:00 16:23 17:00 17:02


 


Temp  36.4  


 


Pulse 71  79 


 


Resp 19  28 


 


B/P (MAP) 104/60 (75)  101/92 (95) 


 


Pulse Ox 93  92 94


 


O2 Delivery Nasal Cannula  Nasal Cannula Nasal Cannula


 


O2 Flow Rate 2.00  2.00 1.50


 


    





 4/19/21   





 18:00   


 


Pulse 77   


 


Resp 23   


 


B/P (MAP) 95/70 (78)   


 


Pulse Ox 93   


 


O2 Delivery Nasal Cannula   


 


O2 Flow Rate 2.00   














 4/19/21





 00:00


 


Intake Total 1260 ml


 


Output Total 900 ml


 


Balance 360 ml








Weight (Pounds):  97


Weight (Ounces):  0.0


Weight (Calculated Kilograms):  43.123478


Constitutional:  appears stated age, AAO x 3; No apparent distress; well-

developed, well-nourished


Respiratory:  chest is bilaterally symmetric, lungs clear to auscultation


Cardiovascular:  regular rate-rhythm, S1 and S2


Gastrointestional:  No tender; soft, audible bowel sounds


Extremities:  no lower extremity edema bilateral


Neurologic/Psychiatric:  grossly intact (moves all extremities)


Skin:  No ulcerations, No rash on exposed areas





Results/Procedures:


Labs


Laboratory Tests


4/19/21 03:20: 


White Blood Count 9.0, Red Blood Count 3.26L, Hemoglobin 10.1L, Hematocrit 31L, 

Mean Corpuscular Volume 94, Mean Corpuscular Hemoglobin 31, Mean Corpuscular 

Hemoglobin Concent 33, Red Cell Distribution Width 14.6H, Platelet Count 132, 

Mean Platelet Volume 9.5, Immature Granulocyte % (Auto) 1, Neutrophils (%) 

(Auto) 71, Lymphocytes (%) (Auto) 5L, Monocytes (%) (Auto) 4, Eosinophils (%) 

(Auto) 20H, Basophils (%) (Auto) 0, Neutrophils # (Auto) 6.4, Lymphocytes # 

(Auto) 0.4L, Monocytes # (Auto) 0.3, Eosinophils # (Auto) 1.8H, Basophils # 

(Auto) 0.0, Immature Granulocyte # (Auto) 0.1, Sodium Level 135, Potassium Level

3.9, Chloride Level 103, Carbon Dioxide Level 16L, Anion Gap 16H, Blood Urea 

Nitrogen 35H, Creatinine 2.34H, Estimat Glomerular Filtration Rate 20, 

BUN/Creatinine Ratio 15, Glucose Level 104, Calcium Level 7.4L, Phosphorus Level

2.7, Magnesium Level 2.5H


4/19/21 10:30: Lactic Acid Level 1.68





Microbiology


4/17/21 MRSA Screen - Final, Complete


          MRSA not isolated


4/17/21 Blood Culture - Preliminary, Resulted


          No growth


4/17/21 Urine Culture - Preliminary, Resulted


          Escherichia coli





Laboratory Tests


4/18/21 02:20








4/19/21 03:20











A/P:


Assessment:





UTI with sepsis - management per ICU/medical services





Atrial fibrillation with RVR first diagnosed on 4-18-21 - Converted on Cardizem 

gtt to SR - maintaining SR without any rate controlling agents





Poor candidate for oral anticoagulation therapy since she has had recurrent 

falls in the last few weeks, even in the nursing home, resulting in fracture.  

Therefore, ASA for stroke prophylaxis rather than full oral anticoag





Hypotensive requiring pressor support likely secondary to urosepsis


Plan:





Management of UTI with sepsis is per medical/ICU services


Maintaining SR with controlled rate following Cardizem gtt, start oral low dose 

Cardizem


Not felt to be a good candidate for OAC d/t history of frequent falls resulting 

in fracture in the past. Advise ASA 81mg


Records from Dr. Reeder have been reviewed


Monitor lab closely


Replace electrolytes as indicated











AZALEA AGUIRRE MD FACP FACC CCDS   Apr 19, 2021 18:36

## 2021-04-19 NOTE — PHYSICIAN QUERY CLARIFICATION
Physician Query-General


Query to Physician:





The medical record reflects the following clinical evidence:


Clinical Indicators: Admitted with "confusion", GCS of 14 on admission improved 

to 15 


Risk Factor(s): Sepsis/Septic shock, UTI, hyponatremia


Treatment: IV Fluids, NS and LR, IV ABX, ICU monitoring





1. Metabolic encephalopathy, present on admission, now resolved


2. Other explanation of clinical findings


3. Unable to determine (no explanation for clinical findings)








Please clarify and document your clinical opinion in the progress notes and 

discharge summary including the definitive and/or presumptive diagnosis, 

(suspected or probable), related to the above clinical findings. Please include 

clinical findings supporting your diagnosis.


Sherri Parisi


[phone]


[email or web address]





PHYSICIAN RESPONSE:


Based on the clinical findings in the record, please respond to the query above 

on this document as an addendum. 








Physician Response:


Physician Response


I did not see the patient on admission so I'm not certain. Please refer to 

admitting physician.














If you have questions please contact:


                   


:


Ext:





Thank you for your time and cooperation.


Clinical /








*********************This is a permanent part of the medical 

record*******************











SHERRI PARISI                   Apr 19, 2021 16:28


KHLOE NICOLE MD              Apr 19, 2021 19:58

## 2021-04-20 VITALS — SYSTOLIC BLOOD PRESSURE: 94 MMHG | DIASTOLIC BLOOD PRESSURE: 50 MMHG

## 2021-04-20 VITALS — SYSTOLIC BLOOD PRESSURE: 96 MMHG | DIASTOLIC BLOOD PRESSURE: 49 MMHG

## 2021-04-20 VITALS — DIASTOLIC BLOOD PRESSURE: 53 MMHG | SYSTOLIC BLOOD PRESSURE: 96 MMHG

## 2021-04-20 VITALS — DIASTOLIC BLOOD PRESSURE: 59 MMHG | SYSTOLIC BLOOD PRESSURE: 115 MMHG

## 2021-04-20 VITALS — SYSTOLIC BLOOD PRESSURE: 129 MMHG | DIASTOLIC BLOOD PRESSURE: 63 MMHG

## 2021-04-20 VITALS — SYSTOLIC BLOOD PRESSURE: 104 MMHG | DIASTOLIC BLOOD PRESSURE: 47 MMHG

## 2021-04-20 VITALS — SYSTOLIC BLOOD PRESSURE: 118 MMHG | DIASTOLIC BLOOD PRESSURE: 54 MMHG

## 2021-04-20 VITALS — SYSTOLIC BLOOD PRESSURE: 103 MMHG | DIASTOLIC BLOOD PRESSURE: 57 MMHG

## 2021-04-20 VITALS — SYSTOLIC BLOOD PRESSURE: 95 MMHG | DIASTOLIC BLOOD PRESSURE: 48 MMHG

## 2021-04-20 LAB
BASOPHILS # BLD AUTO: 0 10^3/UL (ref 0–0.1)
BASOPHILS NFR BLD AUTO: 0 % (ref 0–10)
BUN/CREAT SERPL: 13
CALCIUM SERPL-MCNC: 7.7 MG/DL (ref 8.5–10.1)
CHLORIDE SERPL-SCNC: 108 MMOL/L (ref 98–107)
CO2 SERPL-SCNC: 18 MMOL/L (ref 21–32)
CREAT SERPL-MCNC: 2.56 MG/DL (ref 0.6–1.3)
EOSINOPHIL # BLD AUTO: 1.9 10^3/UL (ref 0–0.3)
EOSINOPHIL NFR BLD AUTO: 24 % (ref 0–10)
EOSINOPHIL NFR BLD MANUAL: 16 %
GFR SERPLBLD BASED ON 1.73 SQ M-ARVRAT: 18 ML/MIN
GLUCOSE SERPL-MCNC: 99 MG/DL (ref 70–105)
HCT VFR BLD CALC: 31 % (ref 35–52)
HGB BLD-MCNC: 9.8 G/DL (ref 11.5–16)
LYMPHOCYTES # BLD AUTO: 0.6 10^3/UL (ref 1–4)
LYMPHOCYTES NFR BLD AUTO: 8 % (ref 12–44)
MAGNESIUM SERPL-MCNC: 2.2 MG/DL (ref 1.6–2.4)
MANUAL DIFFERENTIAL PERFORMED BLD QL: YES
MCH RBC QN AUTO: 31 PG (ref 25–34)
MCHC RBC AUTO-ENTMCNC: 32 G/DL (ref 32–36)
MCV RBC AUTO: 96 FL (ref 80–99)
MONOCYTES # BLD AUTO: 0.6 10^3/UL (ref 0–1)
MONOCYTES NFR BLD AUTO: 7 % (ref 0–12)
MONOCYTES NFR BLD: 8 %
NEUTROPHILS # BLD AUTO: 4.7 10^3/UL (ref 1.8–7.8)
NEUTROPHILS NFR BLD AUTO: 60 % (ref 42–75)
NEUTS BAND NFR BLD MANUAL: 63 %
NEUTS BAND NFR BLD: 5 %
PHOSPHATE SERPL-MCNC: 2.6 MG/DL (ref 2.3–4.7)
PLATELET # BLD: 125 10^3/UL (ref 130–400)
PMV BLD AUTO: 9.4 FL (ref 9–12.2)
POTASSIUM SERPL-SCNC: 4.2 MMOL/L (ref 3.6–5)
RBC MORPH BLD: NORMAL
SODIUM SERPL-SCNC: 135 MMOL/L (ref 135–145)
VARIANT LYMPHS NFR BLD MANUAL: 8 %
WBC # BLD AUTO: 7.9 10^3/UL (ref 4.3–11)

## 2021-04-20 RX ADMIN — MAGNESIUM SULFATE IN DEXTROSE SCH MLS/HR: 10 INJECTION, SOLUTION INTRAVENOUS at 05:24

## 2021-04-20 RX ADMIN — ENOXAPARIN SODIUM SCH MG: 100 INJECTION SUBCUTANEOUS at 08:26

## 2021-04-20 RX ADMIN — POTASSIUM CHLORIDE SCH MEQ: 1500 TABLET, EXTENDED RELEASE ORAL at 05:24

## 2021-04-20 RX ADMIN — SODIUM CHLORIDE, SODIUM LACTATE, POTASSIUM CHLORIDE, AND CALCIUM CHLORIDE SCH MLS/HR: 600; 310; 30; 20 INJECTION, SOLUTION INTRAVENOUS at 16:25

## 2021-04-20 RX ADMIN — SODIUM CHLORIDE SCH MLS/HR: 900 INJECTION INTRAVENOUS at 02:24

## 2021-04-20 RX ADMIN — ASPIRIN 81 MG CHEWABLE TABLET SCH MG: 81 TABLET CHEWABLE at 08:26

## 2021-04-20 RX ADMIN — SODIUM CHLORIDE, SODIUM LACTATE, POTASSIUM CHLORIDE, AND CALCIUM CHLORIDE SCH MLS/HR: 600; 310; 30; 20 INJECTION, SOLUTION INTRAVENOUS at 06:34

## 2021-04-20 RX ADMIN — SODIUM CHLORIDE SCH MLS/HR: 900 INJECTION INTRAVENOUS at 16:18

## 2021-04-20 RX ADMIN — ACETAMINOPHEN PRN MG: 325 TABLET ORAL at 19:55

## 2021-04-20 RX ADMIN — DILTIAZEM HYDROCHLORIDE SCH MG: 120 CAPSULE, COATED, EXTENDED RELEASE ORAL at 08:26

## 2021-04-20 RX ADMIN — POTASSIUM CHLORIDE SCH MLS/HR: 200 INJECTION, SOLUTION INTRAVENOUS at 05:24

## 2021-04-20 NOTE — PROGRESS NOTE - CARDIOLOGY
Cardiology SOAP Progress Note


Subjective:


Sitting up in recliner at the bedside


No c/o CP, palpitations 


Reports feeling of SOB at times





Objective:


I&O/Vital Signs











 4/22/21 4/23/21 4/23/21 4/23/21





 23:49 01:00 05:16 07:00


 


Temp 36.6  37.3 


 


Pulse 78 80 86 74


 


Resp 20  20 


 


B/P (MAP) 145/67 (93)  118/65 (82) 


 


Pulse Ox 95  93 


 


O2 Delivery Nasal Cannula  Nasal Cannula 


 


O2 Flow Rate 2.00  2.00 


 


    





 4/23/21 4/23/21 4/23/21 





 08:00 08:00 09:54 


 


Temp 35.8   


 


Pulse 63   


 


Resp 18   


 


B/P (MAP) 146/64 (91)   


 


Pulse Ox 91  95 


 


O2 Delivery Nasal Cannula Nasal Cannula Nasal Cannula 


 


O2 Flow Rate 2.00 2.00 2.00 














 4/23/21





 00:00


 


Intake Total 2720 ml


 


Output Total 500 ml


 


Balance 2220 ml








Weight (Pounds):  97


Weight (Ounces):  0.0


Weight (Calculated Kilograms):  43.903816


Constitutional:  appears stated age, AAO x 3; No apparent distress; well-

developed, well-nourished


Respiratory:  chest is bilaterally symmetric, lungs clear to auscultation


Cardiovascular:  regular rate-rhythm, S1 and S2


Gastrointestional:  No tender; soft, audible bowel sounds


Extremities:  no lower extremity edema bilateral


Neurologic/Psychiatric:  grossly intact (moves all extremities)


Skin:  No ulcerations, No rash on exposed areas





Results/Procedures:


Labs


Laboratory Tests


4/23/21 06:00: 


White Blood Count 9.1, Red Blood Count 3.45L, Hemoglobin 10.6L, Hematocrit 33L, 

Mean Corpuscular Volume 95, Mean Corpuscular Hemoglobin 31, Mean Corpuscular 

Hemoglobin Concent 32, Red Cell Distribution Width 14.6H, Platelet Count 215, 

Mean Platelet Volume 9.1, Immature Granulocyte % (Auto) 2, Neutrophils (%) 

(Auto) 63, Lymphocytes (%) (Auto) 12, Monocytes (%) (Auto) 9, Eosinophils (%) 

(Auto) 14H, Basophils (%) (Auto) 0, Neutrophils # (Auto) 5.8, Lymphocytes # 

(Auto) 1.1, Monocytes # (Auto) 0.8, Eosinophils # (Auto) 1.3H, Basophils # 

(Auto) 0.0, Immature Granulocyte # (Auto) 0.2H, Sodium Level 133L, Potassium 

Level 4.5, Chloride Level 105, Carbon Dioxide Level 20L, Anion Gap 8, Blood Urea

Nitrogen 38H, Creatinine 3.17#H, Estimat Glomerular Filtration Rate 14, 

BUN/Creatinine Ratio 12, Glucose Level 91, Calcium Level 7.6L, Phosphorus Level 

2.9, Magnesium Level 1.8





Microbiology


4/17/21 MRSA Screen - Final, Complete


          MRSA not isolated


4/17/21 Blood Culture - Final, Complete


          No growth


4/17/21 Urine Culture - Final, Complete


          Escherichia coli








A/P:


Assessment:





UTI with sepsis - management per ICU/medical services





Atrial fibrillation with RVR first diagnosed on 4-18-21 - Converted on Cardizem 

gtt to SR - maintaining SR without any rate controlling agents





Poor candidate for oral anticoagulation therapy since she has had recurrent 

falls in the last few weeks, even in the nursing home, resulting in fracture.  

Therefore, ASA for stroke prophylaxis rather than full oral anticoag





Hypotensive requiring pressor support likely secondary to urosepsis





Acute on chronic renal insufficiency - likely DANIEL secondary to hypotension - 

receiving IVF


Plan:





Management of UTI with sepsis is per medical/ICU services


Maintaining SR with controlled rate continue low dose Cardizem


Not felt to be a good candidate for OAC d/t history of frequent falls resulting 

in fracture in the past. 


Continue ASA 81mg


Monitor lab closely


Replace electrolytes as indicated


DANIEL on CRIF - receiving IVF











LORENA ALFARO           Apr 20, 2021 10:12

## 2021-04-20 NOTE — PULMONARY PROGRESS NOTE
Subjective


Time Seen by a Provider:  05:43


Subjective/Events-last exam


Pt appears to be doing better





Sepsis Event


Evaluation


Height, Weight, BMI


Height: 5'6.00"


Weight: 97lbs. 0.0oz. 43.785341um; 19.83 BMI


Method:Stated





Focused Exam


Lactate Level


4/17/21 17:44: Lactic Acid Level 3.99*H


4/17/21 20:10: Lactic Acid Level 1.86


4/19/21 10:30: Lactic Acid Level 1.68





Exam


Exam





Vital Signs








  Date Time  Temp Pulse Resp B/P (MAP) Pulse Ox O2 Delivery O2 Flow Rate FiO2


 


4/20/21 05:00  63 18 103/57 (72) 93 Nasal Cannula 2.00 


 


4/20/21 05:00      Nasal Cannula 2.00 


 


4/20/21 04:00  58 13 94/50 (65) 92 Nasal Cannula 2.00 


 


4/20/21 03:00  71 20 118/54 (75)  Nasal Cannula 2.00 


 


4/20/21 02:00  81 20 96/49 (65) 92 Nasal Cannula 2.00 


 


4/20/21 01:00  68      


 


4/20/21 01:00      Nasal Cannula 2.00 


 


4/20/21 01:00  68 17 95/48 (64) 92 Nasal Cannula 2.00 


 


4/20/21 00:00  78 18 104/47 (66) 95 Nasal Cannula 2.00 


 


4/19/21 23:24 37.2     Nasal Cannula 2.00 


 


4/19/21 23:00  67 18 104/51 (68) 87 Nasal Cannula 2.00 


 


4/19/21 22:00  74 20 109/92 (98) 93 Nasal Cannula 2.00 


 


4/19/21 21:00      Nasal Cannula 2.00 


 


4/19/21 21:00  76 28 108/87 (94) 92 Nasal Cannula 2.00 


 


4/19/21 20:30 37.0       


 


4/19/21 20:00  63 18 104/54 (71) 90 Nasal Cannula 2.00 


 


4/19/21 19:00  76      


 


4/19/21 19:00  70 22 114/57 (76) 90 Nasal Cannula 2.00 


 


4/19/21 18:00  77 23 95/70 (78) 93 Nasal Cannula 2.00 


 


4/19/21 17:02     94 Nasal Cannula 1.50 


 


4/19/21 17:00  79 28 101/92 (95) 92 Nasal Cannula 2.00 


 


4/19/21 16:23 36.4       


 


4/19/21 16:00  71 19 104/60 (75) 93 Nasal Cannula 2.00 


 


4/19/21 15:00  75 19 108/57 (74) 94 Nasal Cannula 2.00 


 


4/19/21 14:00  71 18 107/50 (69) 86 Nasal Cannula 2.00 


 


4/19/21 13:30     94 Nasal Cannula 1.50 


 


4/19/21 13:00  68 18 106/52 (70) 88 Nasal Cannula 2.00 


 


4/19/21 13:00 36.7       


 


4/19/21 13:00  70      


 


4/19/21 12:00  74 22 115/57 (76) 91 Nasal Cannula 2.00 


 


4/19/21 11:00  67 19 100/55 (70) 88 Nasal Cannula 2.00 


 


4/19/21 10:00  68 17 93/51 (65) 89 Nasal Cannula 2.00 


 


4/19/21 09:00  70 18 92/43 (59) 89 Nasal Cannula 2.00 


 


4/19/21 09:00     94 Nasal Cannula 1.50 


 


4/19/21 08:00  67 15 94/60 (71) 93 Nasal Cannula 2.00 


 


4/19/21 08:00 36.2       


 


4/19/21 07:06     93  1.50 


 


4/19/21 07:00  72      


 


4/19/21 07:00  71 15 93/49 (64) 93 Nasal Cannula 2.00 


 


4/19/21 06:00  69 16 90/45 (60) 90 Nasal Cannula 2.00 














I & O 


 


 4/20/21





 07:00


 


Intake Total 2810 ml


 


Output Total 1550 ml


 


Balance 1260 ml








Height & Weight


Height: 5'6.00"


Weight: 97lbs. 0.0oz. 43.053280js; 19.83 BMI


Method:Stated


General Appearance:  No Apparent Distress, WD/WN


HEENT:  PERRL/EOMI, Pharynx Normal


Neck:  Normal Inspection, Supple


Respiratory:  Lungs Clear, No Respiratory Distress


Cardiovascular:  Regular Rate, Rhythm, No Murmur


Capillary Refill:  Less Than 3 Seconds


Extremity:  Normal Inspection, Non Tender, No Pedal Edema


Neurologic/Psychiatric:  Alert, Oriented x3


Skin:  Warm/Dry, Erythema


Lymphatic:  No Adenopathy





Results


Lab


Laboratory Tests


4/19/21 03:20








4/20/21 02:30











Assessment/Plan


Assessment/Plan


Septic shock due to UTI with Ecoli 


   -LR at 100 currently 


   -Currently on Merrem 


Lactic acidosis-- improved 


Hypotension - resolved 


Acute renal failure 


   -UO is good 


   -Monitor 


Atrial fibrillation with RVR


   Cardiology consulted


    Cardizem gtt d/c'd 


   Lovenox


Hyponatremia-- Resolved











SHANNA VRIGEN DO              Apr 20, 2021 05:48

## 2021-04-20 NOTE — DIAGNOSTIC IMAGING REPORT
EXAMINATION: Portable erect AP chest at 319 hours.



INDICATION: Respiratory distress.



FINDINGS: The cardiomegaly, the pulmonary congestion and the

bibasilar pneumonia/atelectasis and bilateral pleural effusions

seen on the prior exam of 04/19/2021 are again evident. The

central pulmonary vascularity may be somewhat less prominent than

on the prior study. Overall, however, there has been no

significant change.



The mediastinum is not widened. The osseous structures are

intact. The central venous catheter on the left is unchanged in

position.



IMPRESSION: There may be slightly less pulmonary congestion than

noted on the prior exam. Overall, however there has been no

significant change. A followup study would be recommended for

continued evaluation.



Dictated by: 



  Dictated on workstation # [id number]

## 2021-04-20 NOTE — PHYSICIAN QUERY CLARIFICATION
Physician Query-General


Query to Physician:


The medical record reflects the following clinical evidence:


Clinical Indicators: Admitted with "confusion", GCS of 14 on admission improved 

to 15 


Risk Factor(s): Sepsis/Septic shock, UTI, hyponatremia


Treatment: IV Fluids, NS and LR, IV ABX, ICU monitoring





1. Metabolic encephalopathy, present on admission, now resolved


2. Other explanation of clinical findings


3. Unable to determine (no explanation for clinical findings)








Please clarify and document your clinical opinion in the progress notes and 

discharge summary including the definitive and/or presumptive diagnosis, 

(suspected or probable), related to the above clinical findings. Please include 

clinical findings supporting your diagnosis.


Sherri Parisi


[phone]


[email or web address]





PHYSICIAN RESPONSE:


Based on the clinical findings in the record, please respond to the query above 

on this document as an addendum. 








Physician Response:


Physician Response


Septic encephalopathy














If you have questions please contact:


                   


:


Ext:





Thank you for your time and cooperation.


Clinical /








*********************This is a permanent part of the medical 

record*******************











SHERRI PARISI                   Apr 20, 2021 09:40


KEITH WHITE MD              Apr 22, 2021 07:07

## 2021-04-20 NOTE — PROGRESS NOTE - CARDIOLOGY
Cardiology SOAP Progress Note


Subjective:


Gen malaise and weakness


No cp or palp or syncope


Shortness of breath with exertion


No n/v/d





Objective:


I&O/Vital Signs











 4/20/21 4/20/21 4/20/21 4/20/21





 06:44 07:48 08:00 08:30


 


Temp  36.6  


 


Pulse 65  72 


 


Resp   27 


 


B/P (MAP)    


 


Pulse Ox   87 


 


O2 Delivery   Nasal Cannula Nasal Cannula


 


O2 Flow Rate   2.00 2.00


 


    





 4/20/21 4/20/21 4/20/21 4/20/21





 12:24 13:00 14:45 16:30


 


Temp    36.7


 


Pulse 65   61


 


Resp    24


 


B/P (MAP)    115/59 (77)


 


Pulse Ox   92 95


 


O2 Delivery  Nasal Cannula Nasal Cannula Nasal Cannula


 


O2 Flow Rate  2.00 2.00 2.00














 4/20/21





 00:00


 


Intake Total 1520 ml


 


Output Total 450 ml


 


Balance 1070 ml








Weight (Pounds):  97


Weight (Ounces):  0.0


Weight (Calculated Kilograms):  43.791073


Constitutional:  appears stated age, AAO x 3; No apparent distress; well-

developed, other (Thin-appearing)


Respiratory:  chest is bilaterally symmetric, lungs clear to auscultation


Cardiovascular:  regular rate-rhythm, S1 and S2


Gastrointestional:  No tender; soft, audible bowel sounds


Extremities:  no lower extremity edema bilateral


Neurologic/Psychiatric:  grossly intact (moves all extremities)


Skin:  No ulcerations, No rash on exposed areas





Results/Procedures:


Labs


Laboratory Tests


4/20/21 02:30: 


White Blood Count 7.9, Red Blood Count 3.18L, Hemoglobin 9.8L, Hematocrit 31L, 

Mean Corpuscular Volume 96, Mean Corpuscular Hemoglobin 31, Mean Corpuscular 

Hemoglobin Concent 32, Red Cell Distribution Width 14.6H, Platelet Count 125L, 

Mean Platelet Volume 9.4, Immature Granulocyte % (Auto) 0, Neutrophils (%) 

(Auto) 60, Lymphocytes (%) (Auto) 8L, Monocytes (%) (Auto) 7, Eosinophils (%) 

(Auto) 24H, Basophils (%) (Auto) 0, Neutrophils # (Auto) 4.7, Lymphocytes # 

(Auto) 0.6L, Monocytes # (Auto) 0.6, Eosinophils # (Auto) 1.9H, Basophils # 

(Auto) 0.0, Immature Granulocyte # (Auto) 0.0, Neutrophils % (Manual) 63, 

Lymphocytes % (Manual) 8, Monocytes % (Manual) 8, Eosinophils % (Manual) 16, 

Band Neutrophils 5, Blood Morphology Comment NORMAL, Sodium Level 135, Potassium

Level 4.2, Chloride Level 108H, Carbon Dioxide Level 18L, Anion Gap 9, Blood 

Urea Nitrogen 33H, Creatinine 2.56H, Estimat Glomerular Filtration Rate 18, 

BUN/Creatinine Ratio 13, Glucose Level 99, Calcium Level 7.7L, Phosphorus Level 

2.6, Magnesium Level 2.2





Microbiology


4/17/21 MRSA Screen - Final, Complete


          MRSA not isolated


4/17/21 Blood Culture - Preliminary, Resulted


          No growth


4/17/21 Urine Culture - Final, Complete


          Escherichia coli





Laboratory Tests


4/19/21 03:20








4/20/21 02:30











A/P:


Assessment:





UTI with sepsis - management per ICU/medical services





Atrial fibrillation with RVR first diagnosed on 4-18-21 - Converted on Cardizem 

gtt to SR - maintaining SR without any rate controlling agents





Poor candidate for oral anticoagulation therapy since she has had recurrent 

falls in the last few weeks, even in the nursing home, resulting in fracture.  

Therefore, ASA for stroke prophylaxis rather than full oral anticoag





Hypotension requiring pressor support, likely secondary to urosepsis, now 

improved





Acute on chronic renal insufficiency - likely DANIEL secondary to hypotension - 

receiving IVF


Plan:





Management of UTI with sepsis is per medical/ICU services


Maintaining SR with controlled rate continue low dose Cardizem


Not felt to be a good candidate for OAC d/t history of frequent falls resulting 

in fracture in the past. 


Continue ASA 81mg


Monitor lab closely


Replace electrolytes as indicated


DANIEL on CRIF - receiving IVF











AZALEA AGUIRRE MD FACP FAC CCDS   Apr 20, 2021 18:31

## 2021-04-20 NOTE — PROGRESS NOTE - HOSPITALIST
Subjective


HPI/CC On Admission


Date Seen by Provider:  Apr 20, 2021


Time Seen by Provider:  13:18


Zeenat Larry is an 86-year-old female with history of colitis, hip fracture, 

seasonal allergies, debility currently at [facility], who presented 

with confusion.  She denies any fevers or chills.  She denies any shortness of 

breath or cough.  She denies any dysuria or urinary symptoms.  She denies any 

abdominal pain, nausea, vomiting, or diarrhea.  She says she had no idea that 

she was sick.  She is oriented to person, place, and date at the time of my 

examination.


Subjective/Events-last exam


Pt reports doing well from her illness but was frustrated by her meal options 

this morning as she was not able to order a biscuit due to her dietary 

restrictions. I changed order to regular diet and she was much happier then. 

Otherwise no complaints.





Focused Exam


Lactate Level


4/17/21 17:44: Lactic Acid Level 3.99*H


4/17/21 20:10: Lactic Acid Level 1.86


4/19/21 10:30: Lactic Acid Level 1.68








Objective


Exam


Vital Signs





Vital Signs








  Date Time  Temp Pulse Resp B/P (MAP) Pulse Ox O2 Delivery O2 Flow Rate FiO2


 


4/20/21 08:30      Nasal Cannula 2.00 


 


4/20/21 08:00  72 27  87   


 


4/20/21 07:48 36.6       





Capillary Refill : Less Than 3 Seconds


General Appearance:  No Apparent Distress, Chronically ill, Thin


Respiratory:  Lungs Clear, No Respiratory Distress


Cardiovascular:  Regular Rate, Rhythm, No Murmur


Neurologic/Psychiatric:  Alert, Oriented x3





Results/Procedures


Lab


Laboratory Tests


4/20/21 02:30








Patient resulted labs reviewed.


Imaging:  Reviewed Imaging Report





Assessment/Plan


Assessment and Plan


Assess & Plan/Chief Complaint


Septic shock due to UTI


Lactic acidosis- resolved


Acute kidney injury likely due to ATN


Advanced age


Poor prognosis


   Creatinine 2.56, hoping it will start to trend down soon as it likely 

secondary to ATN from shock 


   Central line placed by surgery, pressors off at the monitor but low threshold

for resumption for MAP <60


   Blood cultures NGTD


   UA consistent with UTI


   Urine culture with E coli, pansensitive but has multiiple abx allergies so 

will keep on merrem for now


   IV fluids





Atrial fibrillation with RVR


   Cardiology consulted, appreciate assistance


   Lovenox





Hyponatremia


Hypomagnesemia


   Monitor and correct electrolytes as needed





DVT ppx: Lovenox





Critical Care


Critically Ill Patient











KHLOE NICOLE MD              Apr 20, 2021 13:22

## 2021-04-21 VITALS — DIASTOLIC BLOOD PRESSURE: 55 MMHG | SYSTOLIC BLOOD PRESSURE: 112 MMHG

## 2021-04-21 VITALS — SYSTOLIC BLOOD PRESSURE: 149 MMHG | DIASTOLIC BLOOD PRESSURE: 68 MMHG

## 2021-04-21 VITALS — SYSTOLIC BLOOD PRESSURE: 131 MMHG | DIASTOLIC BLOOD PRESSURE: 68 MMHG

## 2021-04-21 VITALS — SYSTOLIC BLOOD PRESSURE: 131 MMHG | DIASTOLIC BLOOD PRESSURE: 60 MMHG

## 2021-04-21 VITALS — DIASTOLIC BLOOD PRESSURE: 60 MMHG | SYSTOLIC BLOOD PRESSURE: 130 MMHG

## 2021-04-21 VITALS — SYSTOLIC BLOOD PRESSURE: 143 MMHG | DIASTOLIC BLOOD PRESSURE: 67 MMHG

## 2021-04-21 VITALS — SYSTOLIC BLOOD PRESSURE: 139 MMHG | DIASTOLIC BLOOD PRESSURE: 64 MMHG

## 2021-04-21 LAB
BASOPHILS # BLD AUTO: 0 10^3/UL (ref 0–0.1)
BASOPHILS NFR BLD AUTO: 0 % (ref 0–10)
BUN/CREAT SERPL: 14
CALCIUM SERPL-MCNC: 7.7 MG/DL (ref 8.5–10.1)
CHLORIDE SERPL-SCNC: 108 MMOL/L (ref 98–107)
CO2 SERPL-SCNC: 21 MMOL/L (ref 21–32)
CREAT SERPL-MCNC: 2.41 MG/DL (ref 0.6–1.3)
EOSINOPHIL # BLD AUTO: 1.8 10^3/UL (ref 0–0.3)
EOSINOPHIL NFR BLD AUTO: 22 % (ref 0–10)
GFR SERPLBLD BASED ON 1.73 SQ M-ARVRAT: 19 ML/MIN
GLUCOSE SERPL-MCNC: 96 MG/DL (ref 70–105)
HCT VFR BLD CALC: 31 % (ref 35–52)
HGB BLD-MCNC: 9.8 G/DL (ref 11.5–16)
LYMPHOCYTES # BLD AUTO: 0.9 10^3/UL (ref 1–4)
LYMPHOCYTES NFR BLD AUTO: 11 % (ref 12–44)
MAGNESIUM SERPL-MCNC: 1.9 MG/DL (ref 1.6–2.4)
MANUAL DIFFERENTIAL PERFORMED BLD QL: NO
MCH RBC QN AUTO: 31 PG (ref 25–34)
MCHC RBC AUTO-ENTMCNC: 32 G/DL (ref 32–36)
MCV RBC AUTO: 95 FL (ref 80–99)
MONOCYTES # BLD AUTO: 0.8 10^3/UL (ref 0–1)
MONOCYTES NFR BLD AUTO: 9 % (ref 0–12)
NEUTROPHILS # BLD AUTO: 4.6 10^3/UL (ref 1.8–7.8)
NEUTROPHILS NFR BLD AUTO: 57 % (ref 42–75)
PHOSPHATE SERPL-MCNC: 2.5 MG/DL (ref 2.3–4.7)
PLATELET # BLD: 140 10^3/UL (ref 130–400)
PMV BLD AUTO: 9.4 FL (ref 9–12.2)
POTASSIUM SERPL-SCNC: 4 MMOL/L (ref 3.6–5)
SODIUM SERPL-SCNC: 137 MMOL/L (ref 135–145)
WBC # BLD AUTO: 8.2 10^3/UL (ref 4.3–11)

## 2021-04-21 RX ADMIN — SODIUM CHLORIDE SCH MLS/HR: 900 INJECTION INTRAVENOUS at 14:54

## 2021-04-21 RX ADMIN — SODIUM CHLORIDE, SODIUM LACTATE, POTASSIUM CHLORIDE, AND CALCIUM CHLORIDE SCH MLS/HR: 600; 310; 30; 20 INJECTION, SOLUTION INTRAVENOUS at 01:59

## 2021-04-21 RX ADMIN — SODIUM CHLORIDE SCH MLS/HR: 900 INJECTION INTRAVENOUS at 02:45

## 2021-04-21 RX ADMIN — SODIUM CHLORIDE, SODIUM LACTATE, POTASSIUM CHLORIDE, AND CALCIUM CHLORIDE SCH MLS/HR: 600; 310; 30; 20 INJECTION, SOLUTION INTRAVENOUS at 21:34

## 2021-04-21 RX ADMIN — DILTIAZEM HYDROCHLORIDE SCH MG: 120 CAPSULE, COATED, EXTENDED RELEASE ORAL at 08:07

## 2021-04-21 RX ADMIN — ASPIRIN 81 MG CHEWABLE TABLET SCH MG: 81 TABLET CHEWABLE at 08:07

## 2021-04-21 RX ADMIN — ENOXAPARIN SODIUM SCH MG: 100 INJECTION SUBCUTANEOUS at 08:07

## 2021-04-21 RX ADMIN — SODIUM CHLORIDE, SODIUM LACTATE, POTASSIUM CHLORIDE, AND CALCIUM CHLORIDE SCH MLS/HR: 600; 310; 30; 20 INJECTION, SOLUTION INTRAVENOUS at 19:16

## 2021-04-21 NOTE — PROGRESS NOTE - CARDIOLOGY
Cardiology SOAP Progress Note


Subjective:


Notes marked gen malaise and weakness


No cp or palp or syncope


No shortness of breath at rest


No n/v/d





Objective:


I&O/Vital Signs











 4/21/21 4/21/21 4/21/21 4/21/21





 04:00 06:30 08:04 09:00


 


Temp 36.4  36.4 


 


Pulse 70 69 68 


 


Resp 24  15 


 


B/P (MAP) 131/60 (83)  130/60 (83) 


 


Pulse Ox 95  96 


 


O2 Delivery Nasal Cannula  Nasal Cannula Nasal Cannula


 


O2 Flow Rate 2.00  2.00 2.00


 


    





 4/21/21 4/21/21  





 11:40 12:45  


 


Temp 36.5   


 


Pulse 64 67  


 


Resp 20   


 


B/P (MAP) 139/64 (89)   


 


Pulse Ox 96   


 


O2 Delivery Nasal Cannula   


 


O2 Flow Rate 2.00   














 4/21/21





 00:00


 


Intake Total 1140 ml


 


Output Total 725 ml


 


Balance 415 ml








Weight (Pounds):  97


Weight (Ounces):  0.0


Weight (Calculated Kilograms):  43.897009


Constitutional:  appears stated age, AAO x 3; No apparent distress; well-

developed, other (Thin-appearing)


Respiratory:  chest is bilaterally symmetric, lungs clear to auscultation


Cardiovascular:  regular rate-rhythm, S1 and S2


Gastrointestional:  No tender; soft, audible bowel sounds


Extremities:  no lower extremity edema bilateral


Neurologic/Psychiatric:  grossly intact (moves all extremities)


Skin:  No ulcerations, No rash on exposed areas





Results/Procedures:


Labs


Laboratory Tests


4/21/21 06:05: 


White Blood Count 8.2, Red Blood Count 3.20L, Hemoglobin 9.8L, Hematocrit 31L, 

Mean Corpuscular Volume 95, Mean Corpuscular Hemoglobin 31, Mean Corpuscular 

Hemoglobin Concent 32, Red Cell Distribution Width 14.6H, Platelet Count 140, 

Mean Platelet Volume 9.4, Immature Granulocyte % (Auto) 1, Neutrophils (%) 

(Auto) 57, Lymphocytes (%) (Auto) 11L, Monocytes (%) (Auto) 9, Eosinophils (%) 

(Auto) 22H, Basophils (%) (Auto) 0, Neutrophils # (Auto) 4.6, Lymphocytes # 

(Auto) 0.9L, Monocytes # (Auto) 0.8, Eosinophils # (Auto) 1.8H, Basophils # 

(Auto) 0.0, Immature Granulocyte # (Auto) 0.1, Sodium Level 137, Potassium Level

4.0, Chloride Level 108H, Carbon Dioxide Level 21, Anion Gap 8, Blood Urea Nit

rogen 33H, Creatinine 2.41H, Estimat Glomerular Filtration Rate 19, B

UN/Creatinine Ratio 14, Glucose Level 96, Calcium Level 7.7L, Phosphorus Level 

2.5, Magnesium Level 1.9





Microbiology


4/17/21 MRSA Screen - Final, Complete


          MRSA not isolated


4/17/21 Blood Culture - Preliminary, Resulted


          No growth


4/17/21 Urine Culture - Final, Complete


          Escherichia coli





Laboratory Tests


4/20/21 02:30








4/21/21 06:05











A/P:


Assessment:





UTI with sepsis - management per ICU/medical services





Atrial fibrillation with RVR first diagnosed on 4-18-21 - Now NSR and is on oral

diltiazem (long-acting)





Poor candidate for oral anticoagulation therapy since she has had recurrent 

falls in the last few weeks, even in the nursing home, resulting in fracture.  

Therefore, ASA for stroke prophylaxis rather than full oral anticoag





Hypotension requiring pressor support, likely secondary to urosepsis, now 

improved





Acute on chronic renal insufficiency - likely DANIEL secondary to hypotension - 

receiving IVF


Plan:





Management of UTI with sepsis is per medical/ICU services


Maintaining SR with controlled rate continue low dose Cardizem


Not felt to be a good candidate for OAC d/t history of frequent falls resulting 

in fracture in the past. 


Monitor lab closely











AZALEA AGUIRRE MD FACP [facility] CCDS   Apr 21, 2021 13:34

## 2021-04-21 NOTE — DIAGNOSTIC IMAGING REPORT
INDICATION: Atrial fibrillation. Sepsis.



COMPARISON: 04/20/2021



FINDINGS: Single frontal radiographic view of the chest was

obtained and demonstrates persistent, although improved pulmonary

vascular congestion and diffuse interstitial pulmonary edema.

There is persistent complete opacification of the left base

likely on the basis of mild effusion. Trace right basilar

effusion is also suspected. No pneumothorax is seen on either

side. Left sided subclavian central venous catheter seen with tip

in the SVC. Osseous structures show no gross acute abnormalities.



IMPRESSION:

1. Persistent, but improved vascular congestion and diffuse

interstitial pulmonary edema.

2. Persistent bibasilar effusions, left greater than right.



Dictated by: 



  Dictated on workstation # [id number]

## 2021-04-21 NOTE — PROGRESS NOTE - HOSPITALIST
Subjective


HPI/CC On Admission


Date Seen by Provider:  Apr 21, 2021


Time Seen by Provider:  11:51


Zeenat Larry is an 86-year-old female with history of colitis, hip fracture, 

seasonal allergies, debility currently at [facility], who presented 

with confusion.  She denies any fevers or chills.  She denies any shortness of 

breath or cough.  She denies any dysuria or urinary symptoms.  She denies any 

abdominal pain, nausea, vomiting, or diarrhea.  She says she had no idea that 

she was sick.  She is oriented to person, place, and date at the time of my 

examination.


Subjective/Events-last exam


Pt reports doing ok but having sinus congestion. Requesting claritin. I also 

offered to call her family and update them but patient declined the offer as her

daughter was in class.





Focused Exam


Lactate Level


4/19/21 10:30: Lactic Acid Level 1.68








Objective


Exam


Vital Signs





Vital Signs








  Date Time  Temp Pulse Resp B/P (MAP) Pulse Ox O2 Delivery O2 Flow Rate FiO2


 


4/21/21 11:40 36.5 64 20 139/64 (89) 96 Nasal Cannula 2.00 





Capillary Refill : Less Than 3 Seconds


General Appearance:  No Apparent Distress, WD/WN


Respiratory:  Lungs Clear, No Respiratory Distress


Cardiovascular:  Regular Rate, Rhythm, No Murmur


Gastrointestinal:  Normal Bowel Sounds, Non Tender, Soft


Neurologic/Psychiatric:  Alert, Oriented x3





Results/Procedures


Lab


Laboratory Tests


4/21/21 06:05








Patient resulted labs reviewed.


Imaging:  Reviewed Imaging Report





Assessment/Plan


Assessment and Plan


Assess & Plan/Chief Complaint


Septic shock due to UTI


Lactic acidosis- resolved


Acute kidney injury likely due to ATN


Advanced age


Poor prognosis


   Creatinine 2.41, starting to trend down


   Central line placed by surgery, pressors off at the monitor but low threshold

for resumption for MAP <60


   Blood cultures NGTD


   UA consistent with UTI


   Urine culture with E coli, pansensitive but has multiiple abx allergies so 

will keep on merrem for now


   IV fluids


   Resume Claritin





Atrial fibrillation with RVR


   Cardiology consulted, appreciate assistance


   Lovenox





Hyponatremia


Hypomagnesemia


   Monitor and correct electrolytes as needed





DVT ppx: Lovenox





Critical Care


Critically Ill Patient











KHLOE NICOLE MD              Apr 21, 2021 11:54

## 2021-04-22 VITALS — SYSTOLIC BLOOD PRESSURE: 141 MMHG | DIASTOLIC BLOOD PRESSURE: 64 MMHG

## 2021-04-22 VITALS — DIASTOLIC BLOOD PRESSURE: 56 MMHG | SYSTOLIC BLOOD PRESSURE: 113 MMHG

## 2021-04-22 VITALS — SYSTOLIC BLOOD PRESSURE: 145 MMHG | DIASTOLIC BLOOD PRESSURE: 67 MMHG

## 2021-04-22 VITALS — DIASTOLIC BLOOD PRESSURE: 62 MMHG | SYSTOLIC BLOOD PRESSURE: 132 MMHG

## 2021-04-22 VITALS — DIASTOLIC BLOOD PRESSURE: 70 MMHG | SYSTOLIC BLOOD PRESSURE: 137 MMHG

## 2021-04-22 VITALS — DIASTOLIC BLOOD PRESSURE: 60 MMHG | SYSTOLIC BLOOD PRESSURE: 128 MMHG

## 2021-04-22 LAB
BASOPHILS # BLD AUTO: 0 10^3/UL (ref 0–0.1)
BASOPHILS NFR BLD AUTO: 0 % (ref 0–10)
BUN/CREAT SERPL: 13
CALCIUM SERPL-MCNC: 7.9 MG/DL (ref 8.5–10.1)
CHLORIDE SERPL-SCNC: 106 MMOL/L (ref 98–107)
CO2 SERPL-SCNC: 21 MMOL/L (ref 21–32)
CREAT SERPL-MCNC: 2.42 MG/DL (ref 0.6–1.3)
EOSINOPHIL # BLD AUTO: 1.5 10^3/UL (ref 0–0.3)
EOSINOPHIL NFR BLD AUTO: 16 % (ref 0–10)
GFR SERPLBLD BASED ON 1.73 SQ M-ARVRAT: 19 ML/MIN
GLUCOSE SERPL-MCNC: 92 MG/DL (ref 70–105)
HCT VFR BLD CALC: 32 % (ref 35–52)
HGB BLD-MCNC: 10.5 G/DL (ref 11.5–16)
LYMPHOCYTES # BLD AUTO: 1.2 10^3/UL (ref 1–4)
LYMPHOCYTES NFR BLD AUTO: 13 % (ref 12–44)
MAGNESIUM SERPL-MCNC: 1.8 MG/DL (ref 1.6–2.4)
MANUAL DIFFERENTIAL PERFORMED BLD QL: NO
MCH RBC QN AUTO: 31 PG (ref 25–34)
MCHC RBC AUTO-ENTMCNC: 33 G/DL (ref 32–36)
MCV RBC AUTO: 95 FL (ref 80–99)
MONOCYTES # BLD AUTO: 0.9 10^3/UL (ref 0–1)
MONOCYTES NFR BLD AUTO: 10 % (ref 0–12)
NEUTROPHILS # BLD AUTO: 5.7 10^3/UL (ref 1.8–7.8)
NEUTROPHILS NFR BLD AUTO: 60 % (ref 42–75)
PHOSPHATE SERPL-MCNC: 2.4 MG/DL (ref 2.3–4.7)
PLATELET # BLD: 171 10^3/UL (ref 130–400)
PMV BLD AUTO: 9.4 FL (ref 9–12.2)
POTASSIUM SERPL-SCNC: 4.4 MMOL/L (ref 3.6–5)
SODIUM SERPL-SCNC: 134 MMOL/L (ref 135–145)
WBC # BLD AUTO: 9.4 10^3/UL (ref 4.3–11)

## 2021-04-22 RX ADMIN — LORATADINE SCH MG: 10 TABLET ORAL at 08:17

## 2021-04-22 RX ADMIN — DILTIAZEM HYDROCHLORIDE SCH MG: 120 CAPSULE, COATED, EXTENDED RELEASE ORAL at 08:17

## 2021-04-22 RX ADMIN — SODIUM CHLORIDE SCH MLS/HR: 900 INJECTION INTRAVENOUS at 13:37

## 2021-04-22 RX ADMIN — SODIUM CHLORIDE SCH MLS/HR: 900 INJECTION INTRAVENOUS at 02:16

## 2021-04-22 RX ADMIN — ENOXAPARIN SODIUM SCH MG: 100 INJECTION SUBCUTANEOUS at 08:17

## 2021-04-22 RX ADMIN — ACETAMINOPHEN PRN MG: 325 TABLET ORAL at 05:55

## 2021-04-22 RX ADMIN — ASPIRIN 81 MG CHEWABLE TABLET SCH MG: 81 TABLET CHEWABLE at 08:17

## 2021-04-22 NOTE — PROGRESS NOTE - HOSPITALIST
Subjective


HPI/CC On Admission


Date Seen by Provider:  Apr 22, 2021


Time Seen by Provider:  11:23


Zeenat Larry is an 86-year-old female with history of colitis, hip fracture, 

seasonal allergies, debility currently at [facility], who presented 

with confusion.  She denies any fevers or chills.  She denies any shortness of 

breath or cough.  She denies any dysuria or urinary symptoms.  She denies any 

abdominal pain, nausea, vomiting, or diarrhea.  She says she had no idea that 

she was sick.  She is oriented to person, place, and date at the time of my 

examination.


Subjective/Events-last exam


Pt reports feeling weak and having some knee pain. Would also like to sit on the

bed pan. Offered her help to get to the commode but she declined. Discussed plan

going forward to work on getting out of bed and strength. Patient finally agreed

to let me call her daughter. I did call and update Zeenat today to left her know 

what was going on and the plan going forward.





Objective


Exam


Vital Signs





Vital Signs








  Date Time  Temp Pulse Resp B/P (MAP) Pulse Ox O2 Delivery O2 Flow Rate FiO2


 


4/22/21 09:19      Nasal Cannula 2.00 


 


4/22/21 08:00 36.0 67 20 128/60 (82) 93   





Capillary Refill : Less Than 3 Seconds


General Appearance:  No Apparent Distress, Anxious, Thin


Respiratory:  Lungs Clear, No Accessory Muscle Use


Cardiovascular:  Regular Rate, Rhythm, No Murmur


Neurologic/Psychiatric:  Alert, Oriented x3





Results/Procedures


Lab


Laboratory Tests


4/22/21 04:05








Patient resulted labs reviewed.


Imaging:  Reviewed Imaging Report





Assessment/Plan


Assessment and Plan


Assess & Plan/Chief Complaint


Septic shock due to UTI


Lactic acidosis- resolved


Acute kidney injury likely due to ATN


Advanced age


Poor prognosis


Infiltrates on CXR


   Creatinine 2.42, may be new normal though I am hopeful it will trend down 

some still


   Blood cultures NGTD


   UA consistent with UTI


   CXR with infiltrate, continue abx


   Urine culture with E coli, pansensitive but has multiiple abx allergies so 

will keep on merrem for now


   Claritin





Atrial fibrillation with RVR


   Cardiology consulted, appreciate assistance


   Lovenox





Hyponatremia


Hypomagnesemia


   Monitor and correct electrolytes as needed





DVT ppx: Lovenox





Critical Care


Critically Ill Patient











KHLOE NICOLE MD              Apr 22, 2021 11:30

## 2021-04-22 NOTE — OCCUPATIONAL THERAPY EVAL
OT Evaluation-General/PLF


Medical Diagnosis


Admission Date


Apr 17, 2021 at 14:25


Medical Diagnosis:  sepsis/UTI/delirium


Onset Date:  Apr 17, 2021





Therapy Diagnosis


Therapy Diagnosis:  weakness, decreased ADL Status





Height/Weight


Height (Feet):  5


Height (Inches):  6.00


Weight (Pounds):  97


Weight (Ounces):  0.0





Precautions


Precautions/Isolations:  Fall Prevention, Standard Precautions





Referral


Physician:  Norma


Referral Reason:  Evaluation/Treatment





Medical History


Pertinent Medical History:  Arthritis


Additional Medical History


colitis, hip fx, debility


Current History


ED due to confusion





Social History


Home:  Nursing Home





ADL-Prior Level of Function


SCALE: Activities may be completed with or without assistive devices.





6-Indepedent-patient completes the activity by him/herself with no assistance 

from a helper.


5-Set-up or Clean-up Assistance-helper sets up or cleans up; patient completes 

activity. [place] assists only prior to or  


    following the activity.


4-Supervision or Touching Assistance-helper provides verbal cues and/or 

touching/steadying and/or contact guard assistance as patient completes 

activity. Assistance may be provided   


    throughout the activity or intermittently.


3-Partial/Moderate Assistance-helper does LESS THAN HALF the effort. [place] 

lifts, holds or supports trunk or limbs, but provides less than half the effort.


2-Substantial/Maximal Assistance-helper does MORE THAN HALF the effort. [place] 

lifts or holds trunk or limbs and provides more than half the effort.


8-Katiixzrv-hgbnzq does ALL the effort. Patient does none of the effort to 

complete the activity. Or, the assistance of 2 or more helpers is required for 

the patient to complete the  


    activity.


If activity was not attempted, code reason:


7-Patient Refused.


9-Not Applicable-not attempted and the patient did not perform the activity 

before the current illness, exacerbation or injury.


10-Not Attempted due to Environmental Limitations-(lack of equipment, weather 

restraints, etc.).


88-Not Attempted due to Medical Conditions or Safety Concerns.


ADL PLOF Comments


Pt required assistance with ADLs at PLOF.


Self Care:  Needed Some Help


Functional Cognition:  Unknown





OT Current Status


Subjective


Pt seated in recliner, agreeable to OT evaluation and tx.





Mental Status/Objective


Patient Orientation:  Person, Confused


Attachments:  Oxygen





Current


Glasses/Contacts:  Yes


Hand Dominance:  Right


Upper Extremity ROM


Decreased R shoulder function (pt indicates she dislocated her shoulder in 

October), pt unable to actively flex R shoulder. WFL RUE elbow/wrist/fingers


LUE WFL, shoulder flexion to approx 150 degrees.


Upper Extremity Coordination


decreased due to weakness and decreased RUE ROM


Upper Extremity Strength


LUE grossly 3/5, RUE 3/5 elbow/wrist/hand.





ADL-Treatment


Eating (QC):  5 (Pt requires assistance with cutting food and containers)


Oral Hygiene (QC):  4 (SBA at tray table)


Shower/Bathe Self (QC):  1 (Per nursing staff, pt required total assist with 

sponge bath. Pt did not partcipate/assist with any parts)


Toileting Hygiene (QC):  1 (Per nursing staff, pt required total assist with 

hygiene. Pt incontinent of BM requiring total assist to clean.)





Other Treatments


Pt seated in recliner, agreeable to OT evaluation and tx. Pt provided in

formation about PLOF, and participated in UE Screen. Pt indicates she dislocated

R shoulder (~October). Pt attempts to perform shoulder flexion, but states she 

is unable to move her arm. ROM at R elbow, wrist and fingers WFL. LUE WFL. Pt 

agreeable to completing oral care, min verbal cues with task. Pt required 

increased time with oral care, as she was slow with brushing movements and very 

thorough. During session, pt asked OT to hand her things from her tray table, OT

encouraged pt to use her arms and reach for objects, but pt indicates unable to.

Per nursing staff, pt dependent with sponge bath and dependent with toileting 

(incontinent of BM with nursing). Per nursing staff, pt did not participate or 

attempt to assist with sponge bath and hygiene. Post tx, pt seated in recliner, 

call light in reach and all needs met.





Education


OT Patient Education:  Correct positioning, Modified ADL techniques, Progress 

toward Goal/Update tx plan, Purpose of tx/functional activities


Teaching Recipient:  Patient


Teaching Methods:  Discussion


Response to Teaching:  Verbalize Understanding, Reinforcement Needed





OT Long Term Goals


Long Term Goals


Time Frame:  Apr 30, 2021


Eating (QC):  6


Oral Hygiene (QC):  5


Toileting Hygiene (QC):  3


Shower/Bathe Self (QC):  3


Upper Body Dressing (QC):  4


Lower Body Dressing (QC):  3


On/Off Footwear (QC):  3


Additional Goals:  1-Demonstrate ADL Tasks, 2-Verbalize Understanding, 3-

ImproveStrength/Ashley


1=Demonstrate adherence to instructed precautions during ADL tasks.


2=Patient will verbalize/demonstrate understanding of assistive 

devices/modifications for ADL.


3=Patient will improve strength/tolerance for activity to enable patient to 

perform ADL's.





OT Education/Plan


Problem List/Assessment


Assessment:  Decreased Activ Tolerance, Decreased UE Strength, Impaired Funct 

Balance, Impaired I ADL's, Impaired Self-Care Skills, Restricted Funct UE ROM


Pt would benefit from skilled OT services in order to maximize LOF to return to 

NH





Discharge Recommendations


Plan/Recommendations:  Continue POC





Treatment Plan/Plan of Care


Patient would benefit from OT for education, treatment and training to promote 

independence in ADL's, mobility, safety and/or upper extremity function for 

ADL's.


Plan of Care:  ADL Retraining, Functional Mobility, UE Funct Exercise/Act


Treatment Duration:  Apr 30, 2021


Frequency:  5 times per week


Rehab Potential:  Guarded





Time/GCodes


Start Time:  10:15


Stop Time:  10:30


Total Time Billed (hr/min):  15


Billed Treatment Time


1, MERLINE BHARDWAJ OT          Apr 22, 2021 11:23

## 2021-04-22 NOTE — PROGRESS NOTE - CARDIOLOGY
Cardiology SOAP Progress Note


Subjective:


Notes gen malaise and weakness


Feels can't get a full breath


No cp or palp or syncope


No n/v/d


Poor appetite





Objective:


I&O/Vital Signs











 4/21/21 4/22/21 4/22/21 4/22/21





 23:52 00:33 03:48 07:00


 


Temp 36.8  37.0 


 


Pulse 80 78 77 71


 


Resp 22  22 


 


B/P (MAP) 143/67 (92)  132/62 (85) 


 


Pulse Ox 95  94 


 


O2 Delivery Nasal Cannula  Nasal Cannula 


 


O2 Flow Rate 2.00  2.00 


 


    





 4/22/21 4/22/21  





 08:00 09:19  


 


Temp 36.0   


 


Pulse 67   


 


Resp 20   


 


B/P (MAP) 128/60 (82)   


 


Pulse Ox 93   


 


O2 Delivery Nasal Cannula Nasal Cannula  


 


O2 Flow Rate 2.00 2.00  














 4/22/21





 00:00


 


Intake Total 940 ml


 


Output Total 1325 ml


 


Balance -385 ml








Weight (Pounds):  97


Weight (Ounces):  0.0


Weight (Calculated Kilograms):  43.162897


Constitutional:  appears stated age, AAO x 3; No apparent distress; well-

developed, other (Thin-appearing)


Respiratory:  chest is bilaterally symmetric, lungs clear to auscultation


Cardiovascular:  regular rate-rhythm, S1 and S2


Gastrointestional:  No tender; soft, audible bowel sounds


Extremities:  no lower extremity edema bilateral


Neurologic/Psychiatric:  grossly intact (moves all extremities)


Skin:  No ulcerations, No rash on exposed areas





Results/Procedures:


Labs


Laboratory Tests


4/22/21 04:05: 


White Blood Count 9.4, Red Blood Count 3.40L, Hemoglobin 10.5L, Hematocrit 32L, 

Mean Corpuscular Volume 95, Mean Corpuscular Hemoglobin 31, Mean Corpuscular 

Hemoglobin Concent 33, Red Cell Distribution Width 14.6H, Platelet Count 171, 

Mean Platelet Volume 9.4, Immature Granulocyte % (Auto) 1, Neutrophils (%) 

(Auto) 60, Lymphocytes (%) (Auto) 13, Monocytes (%) (Auto) 10, Eosinophils (%) 

(Auto) 16H, Basophils (%) (Auto) 0, Neutrophils # (Auto) 5.7, Lymphocytes # 

(Auto) 1.2, Monocytes # (Auto) 0.9, Eosinophils # (Auto) 1.5H, Basophils # 

(Auto) 0.0, Immature Granulocyte # (Auto) 0.1, Sodium Level 134L, Potassium 

Level 4.4, Chloride Level 106, Carbon Dioxide Level 21, Anion Gap 7, Blood Urea 

Nitrogen 31H, Creatinine 2.42H, Estimat Glomerular Filtration Rate 19, 

BUN/Creatinine Ratio 13, Glucose Level 92, Calcium Level 7.9L, Phosphorus Level 

2.4, Magnesium Level 1.8





Microbiology


4/17/21 MRSA Screen - Final, Complete


          MRSA not isolated


4/17/21 Blood Culture - Preliminary, Resulted


          No growth


4/17/21 Urine Culture - Final, Complete


          Escherichia coli





Laboratory Tests


4/21/21 06:05








4/22/21 04:05











A/P:


Assessment:





UTI with sepsis - management per ICU/medical services





Atrial fibrillation with RVR first diagnosed on 4-18-21 - Now NSR and is on oral

diltiazem (long-acting)





Poor candidate for oral anticoagulation therapy since she has had recurrent 

falls in the last few weeks, even in the nursing home, resulting in fracture.  

Therefore, ASA for stroke prophylaxis rather than full oral anticoag





Hypotension requiring pressor support, likely secondary to urosepsis, now impr

yaneth





Acute on chronic renal insufficiency - likely DANIEL secondary to hypotension - 

receiving IVF





Frail physical status and gen weakness and malaise


Plan:





Management of UTI with sepsis is per medical/ICU services


Continue low dose Cardizem


Not felt to be a good candidate for OAC d/t history of frequent falls resulting 

in fracture in the past. 


Monitor lab closely


Physical therapy











AZALEA AGUIRRE MD FACP [facility] CCDS   Apr 22, 2021 10:45

## 2021-04-22 NOTE — PHYSICAL THERAPY EVALUATION
PT Evaluation-General


Medical Diagnosis


Admission Date


Apr 17, 2021 at 14:25


Medical Diagnosis:  sepsis/UTI/delirium


Onset Date:  Apr 17, 2021





Therapy Diagnosis


Therapy Diagnosis:  generalized weakness/debility





Height/Weight


Height (Feet):  5


Height (Inches):  6.00


Weight (Pounds):  97


Weight (Ounces):  0.0





Precautions


Precautions/Isolations:  Fall Prevention, Standard Precautions





Referral


Physician:  Norma





Medical History


Pertinent Medical History:  Arthritis


Additional Medical History


right hip fracture


Current History


EMS from NH secondary to confusion


Reviewed History:  Yes





Social History


Home:  Nursing Home





Prior


Prior Level of Function


SCALE: Activities may be completed with or without assistive devices.





6-Indepedent-patient completes the activity by him/herself with no assistance 

from a helper.


5-Set-up or Clean-up Assistance-helper sets up or cleans up; patient completes 

activity. [place] assists only prior to or  


    following the activity.


4-Supervision or Touching Assistance-helper provides verbal cues and/or 

touching/steadying and/or contact guard assistance as patient completes 

activity. Assistance may be provided   


    throughout the activity or intermittently.


3-Partial/Moderate Assistance-helper does LESS THAN HALF the effort. [place] 

lifts, holds or supports trunk or limbs, but provides less than half the effort.


2-Substantial/Maximal Assistance-helper does MORE THAN HALF the effort. [place] 

lifts or holds trunk or limbs and provides more than half the effort.


4-Xywvwmxsd-vugwxj does ALL the effort. Patient does none of the effort to 

complete the activity. Or, the assistance of 2 or more helpers is required for 

the patient to complete the  


    activity.


If activity was not attempted, code reason:


7-Patient Refused.


9-Not Applicable-not attempted and the patient did not perform the activity 

before the current illness, exacerbation or injury.


10-Not Attempted due to Environmental Limitations-(lack of equipment, weather 

restraints, etc.).


88-Not Attempted due to Medical Conditions or Safety Concerns.


Bed Mobility:  3


Transfers (B,C,W/C):  3


Gait:  3


Stairs:  9


Indoor Mobility (Ambulation):  Needed Some Help


Stairs:  Not [place]


Prior Devices Use:  Walker





PT Evaluation-Current


Subjective


Patient is very confused.  Reluctantly agrees to PT.





Pain





   Numeric Pain Scale:  10-Worst Possible Pain


   Location:  Right


   Location Body Site:  Hip


   Pain Description:  Chronic





Objective


Patient Orientation:  Confused


Attachments:  Oxygen, Cevallos Catheter





ROM/Strength


ROM Lower Extremities


bilateral LE WFL


Strength Lower Extremities


3-/5 grossly bilateral LE





Integumentary/Posture


Integumentary


refer to nursing notes


Bowel Incontinence:  Yes


Bladder Incontinence:  Cevallos Cath


Posture


kyphotic





Neuromuscular


(Tone, Coordination, Reflexes)


diminished coordination due to inactivity, confusion and overall debility





Sensory


Vision:  Functional


Hearing:  Impaired





Transfers


Roll Left to Right (QC):  2


Sit to Lying (QC):  2


Lying to Sitting/Side of Bed(Q:  2


Sit to Stand (QC):  2


Chair/Bed-to-Chair Xfer(QC):  2





Gait


Does the Patient Walk?:  No and Walking Goal IS indicated


Mode of Locomotion:  Both


Anticipated Mode of Locomotion:  Both


Walk 10 feet (QC):  88


Walk 50 ft with 2 Turns(QC):  88


Walk 150 ft (QC):  88


Distance:  8 steps


Gait Assistive Device:  FWW


Comments/Gait Description


NBOS, shuffle gait with not foot clearance





Wheelchair Training


Does the Pt Use a Wheelchair?:  Yes





Balance


Sitting Static:  Fair


Sitting Dynamic:  Fair


Standing Static:  Fair


 Standing Dynamic:  Fair (fair-)





Assessment/Needs


86 y.o. female, will benefit from skilled PT to address functional strength and 

mobility to improve current LOF.


Rehab Potential:  Guarded





PT Long Term Goals


Long Term Goals


PT Long Term Goals Time Frame:  May 1, 2021


Roll Left & Right (QC):  3


Sit to Lying (QC):  3


Lying-Sitting on Side/Bed(QC):  3


Sit to Stand (QC):  3


Chair/Bed-to-Chair Xfer(QC):  3


Toilet Transfer (QC):  3


Does the Patient Walk:  Yes


Walk 10 feet (QC):  3


Walk 50ft with 2 Turns (QC):  3





PT Plan


Problem List


Problem List:  Activity Tolerance, Functional Strength, Safety, Balance, Gait, 

Transfer, Bed Mobility





Treatment/Plan


Treatment Plan:  Continue Plan of Care


Treatment Plan:  Bed Mobility, Education, Functional Activity Ashley, Functional 

Strength, Gait, Safety, Therapeutic Exercise, Transfers


Treatment Duration:  May 1, 2021


Frequency:  6 times per week


Estimated Hrs Per Day:  .25 hour per day


Patient and/or Family Agrees t:  Yes





Time/GCodes


Time In:  923


Time Out:  933


Total Billed Treatment Time:  10


Total Billed Treatment


1 visit


EVModC 10 min











TRAMAINE WILLIAM PT              Apr 22, 2021 10:59

## 2021-04-23 VITALS — SYSTOLIC BLOOD PRESSURE: 138 MMHG | DIASTOLIC BLOOD PRESSURE: 62 MMHG

## 2021-04-23 VITALS — DIASTOLIC BLOOD PRESSURE: 65 MMHG | SYSTOLIC BLOOD PRESSURE: 118 MMHG

## 2021-04-23 VITALS — DIASTOLIC BLOOD PRESSURE: 64 MMHG | SYSTOLIC BLOOD PRESSURE: 146 MMHG

## 2021-04-23 VITALS — DIASTOLIC BLOOD PRESSURE: 71 MMHG | SYSTOLIC BLOOD PRESSURE: 148 MMHG

## 2021-04-23 VITALS — DIASTOLIC BLOOD PRESSURE: 63 MMHG | SYSTOLIC BLOOD PRESSURE: 140 MMHG

## 2021-04-23 LAB
BASOPHILS # BLD AUTO: 0 10^3/UL (ref 0–0.1)
BASOPHILS NFR BLD AUTO: 0 % (ref 0–10)
BUN/CREAT SERPL: 12
CALCIUM SERPL-MCNC: 7.6 MG/DL (ref 8.5–10.1)
CHLORIDE SERPL-SCNC: 105 MMOL/L (ref 98–107)
CO2 SERPL-SCNC: 20 MMOL/L (ref 21–32)
CREAT SERPL-MCNC: 3.17 MG/DL (ref 0.6–1.3)
EOSINOPHIL # BLD AUTO: 1.3 10^3/UL (ref 0–0.3)
EOSINOPHIL NFR BLD AUTO: 14 % (ref 0–10)
GFR SERPLBLD BASED ON 1.73 SQ M-ARVRAT: 14 ML/MIN
GLUCOSE SERPL-MCNC: 91 MG/DL (ref 70–105)
HCT VFR BLD CALC: 33 % (ref 35–52)
HGB BLD-MCNC: 10.6 G/DL (ref 11.5–16)
LYMPHOCYTES # BLD AUTO: 1.1 10^3/UL (ref 1–4)
LYMPHOCYTES NFR BLD AUTO: 12 % (ref 12–44)
MAGNESIUM SERPL-MCNC: 1.8 MG/DL (ref 1.6–2.4)
MANUAL DIFFERENTIAL PERFORMED BLD QL: NO
MCH RBC QN AUTO: 31 PG (ref 25–34)
MCHC RBC AUTO-ENTMCNC: 32 G/DL (ref 32–36)
MCV RBC AUTO: 95 FL (ref 80–99)
MONOCYTES # BLD AUTO: 0.8 10^3/UL (ref 0–1)
MONOCYTES NFR BLD AUTO: 9 % (ref 0–12)
NEUTROPHILS # BLD AUTO: 5.8 10^3/UL (ref 1.8–7.8)
NEUTROPHILS NFR BLD AUTO: 63 % (ref 42–75)
PHOSPHATE SERPL-MCNC: 2.9 MG/DL (ref 2.3–4.7)
PLATELET # BLD: 215 10^3/UL (ref 130–400)
PMV BLD AUTO: 9.1 FL (ref 9–12.2)
POTASSIUM SERPL-SCNC: 4.5 MMOL/L (ref 3.6–5)
SODIUM SERPL-SCNC: 133 MMOL/L (ref 135–145)
WBC # BLD AUTO: 9.1 10^3/UL (ref 4.3–11)

## 2021-04-23 RX ADMIN — LORATADINE SCH MG: 10 TABLET ORAL at 08:53

## 2021-04-23 RX ADMIN — ASPIRIN 81 MG CHEWABLE TABLET SCH MG: 81 TABLET CHEWABLE at 08:53

## 2021-04-23 RX ADMIN — ENOXAPARIN SODIUM SCH MG: 100 INJECTION SUBCUTANEOUS at 08:53

## 2021-04-23 RX ADMIN — DILTIAZEM HYDROCHLORIDE SCH MG: 120 CAPSULE, COATED, EXTENDED RELEASE ORAL at 08:53

## 2021-04-23 RX ADMIN — SODIUM CHLORIDE SCH MLS/HR: 900 INJECTION, SOLUTION INTRAVENOUS at 10:14

## 2021-04-23 RX ADMIN — SODIUM CHLORIDE SCH MLS/HR: 900 INJECTION, SOLUTION INTRAVENOUS at 20:19

## 2021-04-23 RX ADMIN — ONDANSETRON PRN MG: 4 TABLET, ORALLY DISINTEGRATING ORAL at 08:53

## 2021-04-23 RX ADMIN — SODIUM CHLORIDE SCH MLS/HR: 900 INJECTION INTRAVENOUS at 02:57

## 2021-04-23 NOTE — CONSULTATION
History of Present Illness


History of Present Illness


Patient Consulted On(rubin/time)


4/23/21


 16:15


Date Seen by Provider:  Apr 23, 2021


Time Seen by Provider:  15:30


Reason for Visit:  RLE DVT


History of Present Illness


Called to evaluate pt with RLE DVT.


This is a 87 yo female with multiple medical problems including A-Fib not on 

anticoagulation due to multiple falls. The pt was admitted due to AMS, found to 

have Urosepsis. While in the hospital pt complained of RLE pain, this was 

immediately assessed by US Doppler. This US of RLE revealed a thrombus in the 

peroneal vein of the calf. Common femoral vein, superficial femoral and po

pliteal veins all patent. The pt was already started on anticoagulation w/ 

Lovenox. She was seen and eval at bedside. She was laying comfortably, still 

having on-off RLE tenderness. No CP, SOB. No evidence of abnormal bleeding





Allergies and Home Medications


Allergies


Coded Allergies:  


     nystatin (Verified  Allergy, Mild, HIVES, 7/5/17)


     Sulfa (Sulfonamide Antibiotics) (Verified  Allergy, Unknown, 8/14/19)


     amoxicillin (Verified  Allergy, Unknown, 7/21/17)


     balsalazide (Verified  Allergy, Unknown, 10/7/20)


     chlorhexidine (Verified  Allergy, Unknown, HIVES, 7/5/17)


     clindamycin (Verified  Allergy, Unknown, 7/21/17)


     dicyclomine (Verified  Allergy, Unknown, 8/14/19)


     mesalamine (Verified  Allergy, Unknown, 10/7/20)


     sulfamethoxazole (Unverified  Allergy, Unknown, 10/16/14)


     trimethoprim (Unverified  Allergy, Unknown, 10/16/14)





Home Medications


Acetaminophen 500 Mg Tablet, 500 MG PO Q6H PRN for PAIN-MILD (1-4), (Reported)


   Last Action: Reviewed


Cholecalciferol (Vitamin D3) 25 Mcg Capsule, 25 MCG PO DAILY, (Reported)


   Last Action: Reviewed


Diclofenac Sodium 100 Gm Gel..gram., 4 GM TP QID, (Reported)


   APPLY TO KNEES 


   Last Action: Reviewed


Ferrous Sulfate 325 Mg Tablet, 325 MG PO DAILY, (Reported)


   Last Action: Reviewed


Hydrocodone/Acetaminophen 1 Each Tablet, 0.5-1 TAB PO Q4H PRN for PAIN-MODERATE 

(5-7), (Reported)


   Last Action: Reviewed


Loperamide HCl 2 Mg Tablet, 2 MG PO BID, (Reported)


   Last Action: Reviewed


Loperamide HCl 2 Mg Tablet, 2 MG PO BID PRN for DIARRHEA, (Reported)


   Last Action: Reviewed


Loratadine 10 Mg Tablet, 10 MG PO DAILY PRN for ALLERGY SYMPTOMS, (Reported)


   Last Action: Reviewed


Nitrofurantoin Monohyd/M-Cryst 100 Mg Capsule, 1 EA PO BID, (Reported)


   FILLED 04- #10/5 DAY SUPPLY 


   Last Action: Reviewed


Simethicone 80 Mg Tab.chew, 80 MG PO TID, (Reported)


   Last Action: Reviewed


Simethicone 80 Mg Tab.chew, 80 MG PO HS PRN for GAS, (Reported)


   Last Action: Reviewed





Patient Home Medication List


Home Medication List Reviewed:  Yes





Past Medical-Social-Family Hx


Patient Social History


Alcohol Use:  Denies Use


Smoking Status:  Never a Smoker


2nd Hand Smoke Exposure:  No


Recent Infectious Disease Expo:  No


Recent Hopitalizations:  Yes


Have you traveled recently?:  No


Alcohol Use?:  No





Immunizations Up To Date


Date of Influenza Vaccine:  Oct 23, 2020





Seasonal Allergies


Seasonal Allergies:  Yes





Past Medical History


Surgeries:  Yes (RIGHT KNEE SURGERY (SCOPE)  R hip fx)


Adenoidectomy, Breast


Respiratory:  No


Pneumonia


Cardiac:  Yes (dependent edema of legs)


Chronic Edema/Swelling


Neurological:  No


Genitourinary:  No


Gastrointestinal:  Yes (FISSURE)


Colitis, Hemorrhoids, Chronic Diarrhea


Musculoskeletal:  Yes (SPRAINED RT ANKLE 3 YRS. AGO AND WEARS BRACE TO RIGHT 

ANKLE.  KNEE SCOPED)


Chronic Back Pain


Endocrine:  No


HEENT:  Yes


Hearing Impairment:  Hard of Hearing


Cancer:  No


Psychosocial:  No


Integumentary:  No


Blood Disorders:  No





Family Medical History


No Pertinent Family Hx





Noncontributory





Review of Systems-General


Constitutional:  No no symptoms reported, No chills, No diaphoresis, No 

dizziness, No fever, No malaise, No weakness, No weight gain, No weight loss, No

other


EENTM:  No no symptoms reported, No ear discharge, No hearing loss, No ear pain,

No blurred vision, No double vision, No eye pain, No tearing, No vision loss, No

dental problems, No hoarseness, No mouth pain, No mouth swelling, No epistaxis, 

No nose congestion, No nose pain, No throat pain, No throat swelling, No other


Respiratory:  No no symptoms reported, No cough, No dyspnea on exertion, No 

hemoptysis, No orthopnea, No phlegm, No short of breath, No stridor, No 

wheezing, No other


Cardiovascular:  No no symptoms reported, No chest pain, No edema, No Hx of 

Intervention, No palpitations, No syncope, No vascular heart diseas, No other


Gastrointestinal:  No RUQ, No LUQ, No RLQ, No LLQ, No no symptoms reported, No 

abdominal pain, No constipation, No diarrhea, No dysphagia, No hematemesis, No 

heartburn, No jaundice, No loss of appetite, No melena, No nausea, No vomiting, 

No other


Genitourinary:  No no symptoms reported; decreased output; No discharge, No 

dysuria, No frequency, No hematuria, No hesitancy, No incontinence, No nocturia,

No pain, No other


Musculoskeletal:  no symptoms reported, back pain, gout, joint pain, joint 

swelling, muscle pain (Rt calf), muscle stiffness, muscle cramps, muscle 

twitching, muscle weakness, neck pain, other


Skin:  No no symptoms reported, No change in color, No change in hair/nails, No 

dryness, No hx of skin cancer, No lesions, No lumps, No pruritus, No rash, No 

other


Psychiatric/Neurological:  No Symptoms Reported





Physical Exam-General Problems


Physical Exam


Vital Signs





Vital Signs - First Documented








 4/17/21





 10:30


 


Temp 35.7


 


Pulse 92


 


Resp 18


 


B/P (MAP) 105/43 (63)


 


Pulse Ox 4


 


O2 Delivery Nasal Cannula


 


O2 Flow Rate 2.00





Capillary Refill : Less Than 3 Seconds


General Appearance:  no apparent distress, thin


Eyes:  Bilateral Eye Normal Inspection, Bilateral Eye PERRL, Bilateral Eye EOMI


HEENT:  PERRL/EOMI, normal ENT inspection


Neck:  non-tender, full range of motion, supple, normal inspection


Respiratory:  chest non-tender, lungs clear, normal breath sounds, no 

respiratory distress, no accessory muscle use


Cardiovascular:  normal peripheral pulses, no edema, no gallop, no JVD, systolic

murmur, irregularly irregular


Gastrointestinal:  normal bowel sounds, non tender, soft, no organomegaly, no 

pulsatile mass


Extremities:  normal range of motion, normal inspection, no pedal edema, calf 

tenderness (Rt calf)


Skin:  normal color, warm/dry, cyanosis, cool, diaphoresis


Lymphatic:  no adenopathy





Assessment/Plan


Assessment/Plan


Reason for Inpatient Admission:  


RLE DVT: Long discussion with the pt at bedside. We discussed the


results of her Doppler US of RLE which showed a thrombus in Rt peroneal


vein of


the calf. Causes of VTE inherited and acquired were explained. She is


symptomatic with pain in calf. Pt has been already started on Lovenox.


There is a possibility the pt had this DVT in the RLE prior to admission.


The pt states she has been having pain in her leg recently and that was


not ambulating much prior to admission.





Recommendations:


Will hold on any Hypercoagulable state work up, pt is already on Lovenox,


the results of her hypercoag panel wont be accurate and the results will


not 





Cont with current Lovenox with renal dosing of 1mg/Kg/ day. 


Monitor for excessive bleeding


Monitor kidney function


Monitor PLTs with daily CBC


If excessive or abnormal bleeding occured, hold Lovenox. 





The pt will benefit from anticoagulation for at least 3-6 months


especially since she will be at risk of VTE (PE/ DVT) due to


immobilization. This pt is considered high risk for falls which is the


reason why she is not on anticoagulation for her A-Fib


Risks, benefits of anticoagulation should be discussed with the pt prior


to discharge.


Pt would benefit from low intensity anticoagulation with Eliquis 2.5mg PO


BID unless any contraindication from Cardiology





Copy


Copies To 1:   KHLOE NICOLE MD; AZALEA AGUIRRE MD FACP FAC CCDS











FARA SANTIAGO MD                Apr 23, 2021 16:21

## 2021-04-23 NOTE — PROGRESS NOTE - CARDIOLOGY
Cardiology SOAP Progress Note


Subjective:


Sitting up in recliner at the bedside


States she is not eating much d/t not liking the food


No c/o CP


C/O feeling SOB at times





Objective:


I&O/Vital Signs











 4/22/21 4/23/21 4/23/21 4/23/21





 23:49 01:00 05:16 07:00


 


Temp 36.6  37.3 


 


Pulse 78 80 86 74


 


Resp 20  20 


 


B/P (MAP) 145/67 (93)  118/65 (82) 


 


Pulse Ox 95  93 


 


O2 Delivery Nasal Cannula  Nasal Cannula 


 


O2 Flow Rate 2.00  2.00 


 


    





 4/23/21 4/23/21 4/23/21 





 08:00 08:00 09:54 


 


Temp 35.8   


 


Pulse 63   


 


Resp 18   


 


B/P (MAP) 146/64 (91)   


 


Pulse Ox 91  95 


 


O2 Delivery Nasal Cannula Nasal Cannula Nasal Cannula 


 


O2 Flow Rate 2.00 2.00 2.00 














 4/23/21





 00:00


 


Intake Total 2720 ml


 


Output Total 500 ml


 


Balance 2220 ml








Weight (Pounds):  97


Weight (Ounces):  0.0


Weight (Calculated Kilograms):  43.132139


Constitutional:  appears stated age, AAO x 3; No apparent distress; well-

developed, other (Thin-appearing)


Respiratory:  chest is bilaterally symmetric, lungs clear to auscultation


Cardiovascular:  regular rate-rhythm, S1 and S2


Gastrointestional:  No tender; soft, audible bowel sounds


Extremities:  no lower extremity edema bilateral


Neurologic/Psychiatric:  grossly intact (moves all extremities)


Skin:  No ulcerations, No rash on exposed areas





Results/Procedures:


Labs


Laboratory Tests


4/23/21 06:00: 


White Blood Count 9.1, Red Blood Count 3.45L, Hemoglobin 10.6L, Hematocrit 33L, 

Mean Corpuscular Volume 95, Mean Corpuscular Hemoglobin 31, Mean Corpuscular 

Hemoglobin Concent 32, Red Cell Distribution Width 14.6H, Platelet Count 215, 

Mean Platelet Volume 9.1, Immature Granulocyte % (Auto) 2, Neutrophils (%) 

(Auto) 63, Lymphocytes (%) (Auto) 12, Monocytes (%) (Auto) 9, Eosinophils (%) 

(Auto) 14H, Basophils (%) (Auto) 0, Neutrophils # (Auto) 5.8, Lymphocytes # 

(Auto) 1.1, Monocytes # (Auto) 0.8, Eosinophils # (Auto) 1.3H, Basophils # 

(Auto) 0.0, Immature Granulocyte # (Auto) 0.2H, Sodium Level 133L, Potassium 

Level 4.5, Chloride Level 105, Carbon Dioxide Level 20L, Anion Gap 8, Blood Urea

Nitrogen 38H, Creatinine 3.17#H, Estimat Glomerular Filtration Rate 14, 

BUN/Creatinine Ratio 12, Glucose Level 91, Calcium Level 7.6L, Phosphorus Level 

2.9, Magnesium Level 1.8





Microbiology


4/17/21 MRSA Screen - Final, Complete


          MRSA not isolated


4/17/21 Blood Culture - Final, Complete


          No growth


4/17/21 Urine Culture - Final, Complete


          Escherichia coli





Laboratory Tests


4/22/21 04:05








4/23/21 06:00











A/P:


Assessment:





UTI with sepsis - management per ICU/medical services





Atrial fibrillation with RVR first diagnosed on 4-18-21 - Now NSR and is on oral

diltiazem (long-acting)





Poor candidate for oral anticoagulation therapy since she has had recurrent 

falls in the last few weeks, even in the nursing home, resulting in fracture.  

Therefore, ASA for stroke prophylaxis rather than full oral anticoag





Hypotension requiring pressor support, likely secondary to urosepsis, now 

improved





Acute on chronic renal insufficiency - likely DANIEL secondary to hypotension - 

receiving IVF





Frail physical status and gen weakness and malaise


Plan:





Management of UTI with sepsis is per medical/ICU services


Continue low dose Cardizem


Not felt to be a good candidate for OAC d/t history of frequent falls resulting 

in fracture in the past. 


Monitor lab closely


Physical therapy


DANIEL likely secondary to sepsis - start IVF hydration


Monitor lab closely











LORENA ALFARO           Apr 23, 2021 10:01

## 2021-04-23 NOTE — PROGRESS NOTE - HOSPITALIST
Subjective


HPI/CC On Admission


Date Seen by Provider:  Apr 23, 2021


Time Seen by Provider:  08:00


Zeenat Larry is an 86-year-old female with history of colitis, hip fracture, 

seasonal allergies, debility currently at [facility], who presented 

with confusion.  She denies any fevers or chills.  She denies any shortness of 

breath or cough.  She denies any dysuria or urinary symptoms.  She denies any 

abdominal pain, nausea, vomiting, or diarrhea.  She says she had no idea that 

she was sick.  She is oriented to person, place, and date at the time of my 

examination.


Subjective/Events-last exam


Pt reports not feeling as well today. Has some right sided leg pain. Also 

complains of nasal drainage that is irritating her throat and has some 

positional neck pain that is long standing. She also has some indigestion and 

gas and would just liek to eat something tart like an apple pie. She has been 

told not to eat apples due to her history of colitis.





Objective


Exam


Vital Signs





Vital Signs








  Date Time  Temp Pulse Resp B/P (MAP) Pulse Ox O2 Delivery O2 Flow Rate FiO2


 


4/23/21 09:54     95 Nasal Cannula 2.00 


 


4/23/21 08:00 35.8 63 18 146/64 (91)    





Capillary Refill : Less Than 3 Seconds


General Appearance:  No Apparent Distress, Anxious, Chronically ill, Thin


Respiratory:  Lungs Clear, No Accessory Muscle Use, Other (on 2lpm)


Cardiovascular:  Regular Rate, Rhythm, No Murmur


Gastrointestinal:  Normal Bowel Sounds, Soft


Neurologic/Psychiatric:  Alert, Oriented x3





Results/Procedures


Lab


Laboratory Tests


4/23/21 06:00








Patient resulted labs reviewed.


Imaging:  Reviewed Imaging Report





Assessment/Plan


Assessment and Plan


Assess & Plan/Chief Complaint


Septic shock due to UTI- resolved


Lactic acidosis- resolved


Acute kidney injury likely due to ATN


Advanced age


Poor prognosis


Infiltrates on CXR


   Creatinine up to 3.1 today, IVF resumed


   Blood cultures no growth


   CXR with infiltrate, continue abx


   Urine culture with E coli, pansensitive but has multiiple abx allergies so 

will keep on merrem for now


   Claritin, declines flonase





Atrial fibrillation with RVR


   Cardiology consulted, appreciate assistance


   Lovenox, therapeutic





Hyponatremia


Hypomagnesemia


   Monitor and correct electrolytes as needed





Right leg pain


   Tylenol prn pain


   USG to evaluate for DVT as has been mostly in bed





DVT ppx: Lovenox as above





Critical Care


Critically Ill Patient











KHLOE NICOLE MD              Apr 23, 2021 11:28

## 2021-04-23 NOTE — PROGRESS NOTE - CARDIOLOGY
Cardiology SOAP Progress Note


Subjective:


Gen weakness and malaise


No cp or palp or syncope


Intermittent diarrhea


Bilat leg swelling, more on R





Objective:


I&O/Vital Signs











 4/22/21 4/23/21 4/23/21 4/23/21





 23:49 01:00 05:16 07:00


 


Temp 36.6  37.3 


 


Pulse 78 80 86 74


 


Resp 20  20 


 


B/P (MAP) 145/67 (93)  118/65 (82) 


 


Pulse Ox 95  93 


 


O2 Delivery Nasal Cannula  Nasal Cannula 


 


O2 Flow Rate 2.00  2.00 


 


    





 4/23/21 4/23/21 4/23/21 





 08:00 08:00 09:54 


 


Temp 35.8   


 


Pulse 63   


 


Resp 18   


 


B/P (MAP) 146/64 (91)   


 


Pulse Ox 91  95 


 


O2 Delivery Nasal Cannula Nasal Cannula Nasal Cannula 


 


O2 Flow Rate 2.00 2.00 2.00 














 4/23/21





 00:00


 


Intake Total 2720 ml


 


Output Total 500 ml


 


Balance 2220 ml








Weight (Pounds):  97


Weight (Ounces):  0.0


Weight (Calculated Kilograms):  43.039267


Constitutional:  appears stated age, AAO x 3; No apparent distress; well-

developed, other (Thin-appearing)


Respiratory:  chest is bilaterally symmetric, lungs clear to auscultation


Cardiovascular:  regular rate-rhythm, S1 and S2


Gastrointestional:  No tender; soft, audible bowel sounds


Extremities:  no lower extremity edema bilateral


Neurologic/Psychiatric:  grossly intact (moves all extremities)


Skin:  No ulcerations, No rash on exposed areas





Results/Procedures:


Labs


Laboratory Tests


4/23/21 06:00: 


White Blood Count 9.1, Red Blood Count 3.45L, Hemoglobin 10.6L, Hematocrit 33L, 

Mean Corpuscular Volume 95, Mean Corpuscular Hemoglobin 31, Mean Corpuscular 

Hemoglobin Concent 32, Red Cell Distribution Width 14.6H, Platelet Count 215, 

Mean Platelet Volume 9.1, Immature Granulocyte % (Auto) 2, Neutrophils (%) 

(Auto) 63, Lymphocytes (%) (Auto) 12, Monocytes (%) (Auto) 9, Eosinophils (%) 

(Auto) 14H, Basophils (%) (Auto) 0, Neutrophils # (Auto) 5.8, Lymphocytes # 

(Auto) 1.1, Monocytes # (Auto) 0.8, Eosinophils # (Auto) 1.3H, Basophils # (Au

to) 0.0, Immature Granulocyte # (Auto) 0.2H, Sodium Level 133L, Potassium Level 

4.5, Chloride Level 105, Carbon Dioxide Level 20L, Anion Gap 8, Blood Urea 

Nitrogen 38H, Creatinine 3.17#H, Estimat Glomerular Filtration Rate 14, 

BUN/Creatinine Ratio 12, Glucose Level 91, Calcium Level 7.6L, Phosphorus Level 

2.9, Magnesium Level 1.8





Microbiology


4/17/21 MRSA Screen - Final, Complete


          MRSA not isolated


4/17/21 Blood Culture - Final, Complete


          No growth


4/17/21 Urine Culture - Final, Complete


          Escherichia coli





Laboratory Tests


4/22/21 04:05








4/23/21 06:00











A/P:


Assessment:





UTI with sepsis - management per ICU/medical services





Worsening renal failure - likely DANIEL secondary to poor intake and volume 

depletion





Atrial fibrillation with RVR first diagnosed on 4-18-21 - Now NSR and is on oral

diltiazem (long-acting)





Poor candidate for oral anticoagulation therapy since she has had recurrent 

falls in the last few weeks, even in the nursing home, resulting in fracture.  

Therefore, ASA for stroke prophylaxis rather than full oral anticoag





Frail physical status and gen weakness and malaise


Plan:





Management of UTI with sepsis is per medical/ICU services


Continue low-dose Cardizem


Not felt to be a good candidate for OAC d/t history of frequent falls resulting 

in fracture in the past. 


Monitor lab closely


Physical therapy


Leg venous Doppler to eval for DVT


DANIEL likely secondary to poor intake and vol depletion - start IVF hydration, 

dietary consult (because pt has numerous food intolerances)


Monitor lab closely


I had a long and detailed discussion with her and her eldest daughter regarding 

her CV issues and our treatment plan











AZALEA AGUIRRE MD FACP FAC CCDS   Apr 23, 2021 10:52

## 2021-04-23 NOTE — OCCUPATIONAL THER DAILY NOTE
OT Current Status-Daily Note


Subjective


Pt alert, sitting up in recliner.  Pt c/o pain in buttocks.  Nrsg tech and BAXTER 

transfer pt to bed to decrease pain.  Agrees to therapy.





Mental Status/Objective


Patient Orientation:  Person, Place, Time, [place]


Attachments:  Cevallos Catheter, IV, Oxygen





ADL-Treatment


Pt mod assist x2 for transfers due to pain and decreased mobility.   Min A for 

sit to stand.  Pt stood with BAXTER as nrsg tech completed hygiene after 

incontinent bowel.  Assist x2 for sitting EOB to supine and for all bed mo

bility.  After session, pt lying in bed with call light/phone in reach.  All 

needs met in room.


Therapy Code Descriptions/Definitions 





Functional [place] Measure:


0=Not Assessed/NA        4=Minimal Assistance


1=Total Assistance        5=Supervision or Setup


2=Maximal Assistance  6=Modified [place]


3=Moderate Assistance 7=Complete IndependenceSCALE: Activities may be completed 

with or without assistive devices.





6-Indepedent-patient completes the activity by him/herself with no assistance 

from a helper.


5-Set-up or Clean-up Assistance-helper sets up or cleans up; patient completes 

activity. [place] assists only prior to or  


    following the activity.


4-Supervision or Touching Assistance-helper provides verbal cues and/or 

touching/steadying and/or contact guard assistance as patient completes 

activity. Assistance may be provided   


    throughout the activity or intermittently.


3-Partial/Moderate Assistance-helper does LESS THAN HALF the effort. [place] 

lifts, holds or supports trunk or limbs, but provides less than half the effort.


2-Substantial/Maximal Assistance-helper does MORE THAN HALF the effort. [place] 

lifts or holds trunk or limbs and provides more than half the effort.


9-Uxlyxrybq-czebom does ALL the effort. Patient does none of the effort to 

complete the activity. Or, the assistance of 2 or more helpers is required for 

the patient to complete the  


    activity.


If activity was not attempted, code reason:


7-Patient Refused.


9-Not Applicable-not attempted and the patient did not perform the activity 

before the current illness, exacerbation or injury.


10-Not Attempted due to Environmental Limitations-(lack of equipment, weather 

restraints, etc.).


88-Not Attempted due to Medical Conditions or Safety Concerns.


Toileting Hygiene (QC):  1


Toilet Transfer (QC):  1





OT Long Term Goals


Long Term Goals


Time Frame:  Apr 30, 2021


Eating (QC):  6


Oral Hygiene (QC):  5


Toileting Hygiene (QC):  3


Shower/Bathe Self (QC):  3


Upper Body Dressing (QC):  4


Lower Body Dressing (QC):  3


On/Off Footwear (QC):  3


Additional Goals:  1-Demonstrate ADL Tasks, 2-Verbalize Understanding, 3-

ImproveStrength/Ashley


1=Demonstrate adherence to instructed precautions during ADL tasks.


2=Patient will verbalize/demonstrate understanding of assistive 

devices/modifications for ADL.


3=Patient will improve strength/tolerance for activity to enable patient to 

perform ADL's.





OT Education/Plan


Problem List/Assessment


Assessment:  Decreased Activ Tolerance, Decreased UE Strength, Dependent 

Transfers, Edema, Impaired Bed Mobility, Impaired Coordination, Impaired Funct 

Balance, Impaired Self-Care Skills, Restricted Funct UE ROM


Pt would benefit from skilled OT services in order to maximize LOF to return to 

[facility]





Discharge Recommendations


Plan/Recommendations:  Continue POC





Treatment Plan/Plan of Care


Patient would benefit from OT for education, treatment and training to promote 

independence in ADL's, mobility, safety and/or upper extremity function for 

ADL's.


Plan of Care:  ADL Retraining, Functional Mobility, UE Funct Exercise/Act


Treatment Duration:  Apr 30, 2021


Frequency:  5 times per week


Rehab Potential:  Guarded





Time/GCodes


Start Time:  13:35


Stop Time:  14:05


Total Time Billed (hr/min):  30


Billed Treatment Time


1 visit- ADL 2 (30 min)











KHADAR GARCIA               Apr 23, 2021 14:09

## 2021-04-23 NOTE — PHYSICAL THERAPY DAILY NOTE
PT Daily Note-Current


Subjective


Patient reluctantly agrees to PT.





Mental Status


Patient Orientation:  Person, Time, Situation


Attachments:  Oxygen, [place] Catheter





Transfers


SCALE: Activities may be completed with or without assistive devices.





6-Indepedent-patient completes the activity by him/herself with no assistance 

from a helper.


5-Set-up or Clean-up Assistance-helper sets up or cleans up; patient completes 

activity. [place] assists only prior to or  


    following the activity.


4-Supervision or Touching Assistance-helper provides verbal cues and/or 

touching/steadying and/or contact guard assistance as patient completes 

activity. Assistance may be provided   


    throughout the activity or intermittently.


3-Partial/Moderate Assistance-helper does LESS THAN HALF the effort. [place] 

lifts, holds or supports trunk or limbs, but provides less than half the effort.


2-Substantial/Maximal Assistance-helper does MORE THAN HALF the effort. [place] 

lifts or holds trunk or limbs and provides more than half the effort.


5-Hklcrkjjc-ovajxi does ALL the effort. Patient does none of the effort to 

complete the activity. Or, the assistance of 2 or more helpers is required for 

the patient to complete the  


    activity.


If activity was not attempted, code reason:


7-Patient Refused.


9-Not Applicable-not attempted and the patient did not perform the activity 

before the current illness, exacerbation or injury.


10-Not Attempted due to Environmental Limitations-(lack of equipment, weather 

restraints, etc.).


88-Not Attempted due to Medical Conditions or Safety Concerns.


Roll Left & Right (QC):  1


Lying to Sitting/Side of Bed(Q:  1


Sit to Stand (QC):  1


Chair/Bed-to-Chair Xfer(QC):  1


Patient does not attempt to assist with bed mobility or transfers.  Patient 

states "I can't" before attempting.  Education with patient on actively parti

cipating with therapy to improve strength.





Gait Training


Does the Patient Walk?:  No and Walking Goal IS indicated





Exercises


Supine Ex:  Ankle pumps, Heel Slides, Straight leg raise, Hip abd/add


Supine Reps:  12 (AAROM)





Assessment


Patient incontinent BM requiring dependent assist to cleanse.  Patient up in 

recliner with needs met.  PT to increase activity as patient allows.





PT Long Term Goals


Long Term Goals


PT Long Term Goals Time Frame:  May 1, 2021


Roll Left & Right (QC):  3


Sit to Lying (QC):  3


Lying-Sitting on Side/Bed(QC):  3


Sit to Stand (QC):  3


Chair/Bed-to-Chair Xfer(QC):  3


Toilet Transfer (QC):  3


Does the Patient Walk:  Yes


Walk 10 feet (QC):  3


Walk 50ft with 2 Turns (QC):  3





PT Plan


Treatment/Plan


Treatment Plan:  Continue Plan of Care


Treatment Plan:  Bed Mobility, Education, Functional Activity Ashley, Functional 

Strength, Gait, Safety, Therapeutic Exercise, Transfers


Treatment Duration:  May 1, 2021


Frequency:  6 times per week


Estimated Hrs Per Day:  .25 hour per day


Patient and/or Family Agrees t:  Yes





Time/GCodes


Time In:  946


Time Out:  957


Total Billed Treatment Time:  11


Total Billed Treatment


1 visit


EX 11 min











TRAMAINE WILLIAM PT              Apr 23, 2021 10:10

## 2021-04-24 VITALS — DIASTOLIC BLOOD PRESSURE: 64 MMHG | SYSTOLIC BLOOD PRESSURE: 152 MMHG

## 2021-04-24 VITALS — DIASTOLIC BLOOD PRESSURE: 65 MMHG | SYSTOLIC BLOOD PRESSURE: 117 MMHG

## 2021-04-24 VITALS — SYSTOLIC BLOOD PRESSURE: 137 MMHG | DIASTOLIC BLOOD PRESSURE: 68 MMHG

## 2021-04-24 VITALS — DIASTOLIC BLOOD PRESSURE: 65 MMHG | SYSTOLIC BLOOD PRESSURE: 124 MMHG

## 2021-04-24 LAB
BASOPHILS # BLD AUTO: 0 10^3/UL (ref 0–0.1)
BASOPHILS NFR BLD AUTO: 0 % (ref 0–10)
BUN/CREAT SERPL: 11
CALCIUM SERPL-MCNC: 7.5 MG/DL (ref 8.5–10.1)
CHLORIDE SERPL-SCNC: 108 MMOL/L (ref 98–107)
CO2 SERPL-SCNC: 19 MMOL/L (ref 21–32)
CREAT SERPL-MCNC: 4.1 MG/DL (ref 0.6–1.3)
EOSINOPHIL # BLD AUTO: 1.2 10^3/UL (ref 0–0.3)
EOSINOPHIL NFR BLD AUTO: 15 % (ref 0–10)
GFR SERPLBLD BASED ON 1.73 SQ M-ARVRAT: 10 ML/MIN
GLUCOSE SERPL-MCNC: 98 MG/DL (ref 70–105)
HCT VFR BLD CALC: 31 % (ref 35–52)
HGB BLD-MCNC: 10.1 G/DL (ref 11.5–16)
LYMPHOCYTES # BLD AUTO: 1.4 10^3/UL (ref 1–4)
LYMPHOCYTES NFR BLD AUTO: 17 % (ref 12–44)
MAGNESIUM SERPL-MCNC: 1.8 MG/DL (ref 1.6–2.4)
MANUAL DIFFERENTIAL PERFORMED BLD QL: NO
MCH RBC QN AUTO: 31 PG (ref 25–34)
MCHC RBC AUTO-ENTMCNC: 33 G/DL (ref 32–36)
MCV RBC AUTO: 95 FL (ref 80–99)
MONOCYTES # BLD AUTO: 0.8 10^3/UL (ref 0–1)
MONOCYTES NFR BLD AUTO: 10 % (ref 0–12)
NEUTROPHILS # BLD AUTO: 4.5 10^3/UL (ref 1.8–7.8)
NEUTROPHILS NFR BLD AUTO: 56 % (ref 42–75)
PHOSPHATE SERPL-MCNC: 3.4 MG/DL (ref 2.3–4.7)
PLATELET # BLD: 241 10^3/UL (ref 130–400)
PMV BLD AUTO: 9 FL (ref 9–12.2)
POTASSIUM SERPL-SCNC: 4.6 MMOL/L (ref 3.6–5)
SODIUM SERPL-SCNC: 137 MMOL/L (ref 135–145)
WBC # BLD AUTO: 8.1 10^3/UL (ref 4.3–11)

## 2021-04-24 RX ADMIN — DILTIAZEM HYDROCHLORIDE SCH MG: 120 CAPSULE, COATED, EXTENDED RELEASE ORAL at 10:00

## 2021-04-24 RX ADMIN — ONDANSETRON PRN MG: 4 TABLET, ORALLY DISINTEGRATING ORAL at 10:00

## 2021-04-24 RX ADMIN — ASPIRIN 81 MG CHEWABLE TABLET SCH MG: 81 TABLET CHEWABLE at 10:00

## 2021-04-24 RX ADMIN — ACETAMINOPHEN PRN MG: 325 TABLET ORAL at 00:29

## 2021-04-24 RX ADMIN — ENOXAPARIN SODIUM SCH MG: 100 INJECTION SUBCUTANEOUS at 10:00

## 2021-04-24 RX ADMIN — LORATADINE SCH MG: 10 TABLET ORAL at 10:00

## 2021-04-24 RX ADMIN — SODIUM CHLORIDE SCH MLS/HR: 900 INJECTION, SOLUTION INTRAVENOUS at 05:59

## 2021-04-24 NOTE — DIAGNOSTIC IMAGING REPORT
INDICATION:  Shortness of breath.  



TECHNIQUE:  Single view chest 10:30 AM.



CORRELATION STUDY:  04/21/2021



FINDINGS: 

Left subclavian Xpsduc-d-Bjyr catheter tip at the SVC. Heart size

enlarged and there is the presence of pulmonary vascular

congestion and perihilar edema, adversely changed.  

Prominent interstitial markings compatible with edema. Bilateral

pleural effusions, moderate in size. Opacities at the mid and

lower lung fields are also present.

Asymmetrically advanced degenerative changes of the left

shoulder.



IMPRESSION: 

1. Findings consistent with worsening congestive heart failure.

This includes increasing size of bilateral pleural effusions,

likely associated underlying pulmonary edema. Superposed

infiltrate not excluded.



Dictated by: 



  Dictated on workstation # [id number]

## 2021-04-24 NOTE — PROGRESS NOTE - CARDIOLOGY
Cardiology SOAP Progress Note


Subjective:


Worsening malaise


Worsening oral intake


No cp 


Poor stamina


Shortness of breath with activity


No palp or syncope


Intermittent diarrhea and nausea





Objective:


I&O/Vital Signs











 4/24/21 4/24/21 4/24/21 4/24/21





 07:00 08:00 08:00 12:00


 


Temp   36.1 36.3


 


Pulse 66  71 63


 


Resp   20 20


 


B/P (MAP)   124/65 (84) 152/64 (93)


 


Pulse Ox   95 96


 


O2 Delivery  Nasal Cannula Nasal Cannula Nasal Cannula


 


O2 Flow Rate  2.00 2.00 2.00


 


    





 4/24/21   





 12:35   


 


Pulse 74   














 4/24/21





 00:00


 


Intake Total 2080 ml


 


Output Total 200 ml


 


Balance 1880 ml








Weight (Pounds):  97


Weight (Ounces):  0.0


Weight (Calculated Kilograms):  43.809281


Constitutional:  appears stated age, AAO x 3; No apparent distress; well-

developed, other (Thin-appearing)


Respiratory:  chest is bilaterally symmetric, lungs clear to auscultation


Cardiovascular:  regular rate-rhythm, S1 and S2


Gastrointestional:  No tender; soft, audible bowel sounds


Extremities:  no lower extremity edema bilateral


Neurologic/Psychiatric:  grossly intact (moves all extremities)


Skin:  No ulcerations, No rash on exposed areas





Results/Procedures:


Labs


Laboratory Tests


4/24/21 04:11: 


White Blood Count 8.1, Red Blood Count 3.24L, Hemoglobin 10.1L, Hematocrit 31L, 

Mean Corpuscular Volume 95, Mean Corpuscular Hemoglobin 31, Mean Corpuscular 

Hemoglobin Concent 33, Red Cell Distribution Width 14.8H, Platelet Count 241, 

Mean Platelet Volume 9.0, Immature Granulocyte % (Auto) 2, Neutrophils (%) (

Auto) 56, Lymphocytes (%) (Auto) 17, Monocytes (%) (Auto) 10, Eosinophils (%) 

(Auto) 15H, Basophils (%) (Auto) 0, Neutrophils # (Auto) 4.5, Lymphocytes # 

(Auto) 1.4, Monocytes # (Auto) 0.8, Eosinophils # (Auto) 1.2H, Basophils # 

(Auto) 0.0, Immature Granulocyte # (Auto) 0.2H, Sodium Level 137, Potassium 

Level 4.6, Chloride Level 108H, Carbon Dioxide Level 19L, Anion Gap 10, Blood U

che Nitrogen 46H, Creatinine 4.10#H, Estimat Glomerular Filtration Rate 10, 

BUN/Creatinine Ratio 11, Glucose Level 98, Calcium Level 7.5L, Phosphorus Level 

3.4, Magnesium Level 1.8





Microbiology


4/17/21 MRSA Screen - Final, Complete


          MRSA not isolated


4/17/21 Blood Culture - Final, Complete


          No growth


4/17/21 Urine Culture - Final, Complete


          Escherichia coli





Laboratory Tests


4/23/21 06:00








4/24/21 04:11











A/P:


Assessment:





Ac renal failure, DANIEL 3, etiology unclear





UTI with sepsis - management per ICU/medical services





Atrial fibrillation with RVR first diagnosed on 4-18-21 - Now NSR and is on oral

diltiazem (long-acting)





Poor candidate for oral anticoagulation therapy since she has had recurrent 

falls in the last few weeks, even in the nursing home, resulting in fracture.  

Therefore, ASA for stroke prophylaxis rather than full oral anticoag





Frail physical status and gen weakness and malaise


Plan:





Overall condition is worsening


Ac renal failure is the most significant issue at this time


Agree with transfer to a tertiary care facility











AZALEA AGUIRRE MD FACP FAC CCDS   Apr 24, 2021 15:38

## 2021-04-24 NOTE — DISCHARGE SUMMARY
Diagnosis/Chief Complaint


Date of Admission


Apr 17, 2021 at 14:25


Date of Discharge





Admission Diagnosis


Septic shock due to UTI


Primary Care


Ty Mena MD


Discharge Diagnosis





(1) Septic shock


Status:  Acute


(2) Lactic acidosis


Status:  Acute


(3) DANIEL (acute kidney injury)


Status:  Acute


(4) ATN (acute tubular necrosis)


Status:  Acute


(5) Atrial fibrillation with RVR


Status:  Acute


(6) Hyponatremia


Status:  Acute


(7) Hypomagnesemia


Status:  Acute





Discharge Summary


Discharge Physical Exam


Allergies:  


Coded Allergies:  


     nystatin (Verified  Allergy, Mild, HIVES, 7/5/17)


     Sulfa (Sulfonamide Antibiotics) (Verified  Allergy, Unknown, 8/14/19)


     amoxicillin (Verified  Allergy, Unknown, 7/21/17)


     balsalazide (Verified  Allergy, Unknown, 10/7/20)


     chlorhexidine (Verified  Allergy, Unknown, HIVES, 7/5/17)


     clindamycin (Verified  Allergy, Unknown, 7/21/17)


     dicyclomine (Verified  Allergy, Unknown, 8/14/19)


     mesalamine (Verified  Allergy, Unknown, 10/7/20)


     sulfamethoxazole (Unverified  Allergy, Unknown, 10/16/14)


     trimethoprim (Unverified  Allergy, Unknown, 10/16/14)


Vitals & I&Os





Vital Signs








  Date Time  Temp Pulse Resp B/P (MAP) Pulse Ox O2 Delivery O2 Flow Rate FiO2


 


4/24/21 12:35  74      


 


4/24/21 12:00 36.3  20 152/64 (93) 96 Nasal Cannula 2.00 








General Appearance:  No Apparent Distress, WD/WN, Thin


Respiratory:  No Accessory Muscle Use, Decreased Breath Sounds


Cardiovascular:  Regular Rate, Rhythm, No Murmur


Gastrointestinal:  Normal Bowel Sounds, Non Tender, Soft


Neurologic/Psychiatric:  Alert, Oriented x3, Normal Mood/Affect





Hospital Course


Pt was admitted to the hospital to the ICU due to Septic Shock from UTI and 

pneumonia. She was treated with vasopressors and IV antibiotics and improved and

was able to transfer out of the unit with hemodynamc stability. She was treated 

with Merrem due to her many antibiotic allergies. Did have some right leg pain 

and usg revealed DVT. Lovenox had been already being administered due to stroke 

prophylaxis for a-fib so hematology was consulted. They felt this was not a 

treatment failure but preexisting and just now found so she was continued on 

renally dosed Lovenox.  She did have acute kidney failure on arrival and this 

was relatively stable around 2.5 for multiple days until yesterday when it 

bumped to 3.1. IVF were restarted but despite this her Creatinine went up to 4.1

this morning and UOP continued to decrease. She also became more short of breath

and CXR revealed worsening pulmonary edema. She, her daughter, and I discussed 

treatment options and my recommendation for a nephrology evaluation. [facility] and

[facility] in [place] were both on diversion so I contacted [facility]. She was accepted for

transfer by Dr Charles Damon. I called and updated her family to this along with her

Nimco Suggs the PA at her PCP, Dr Mena's office.


Labs (last 24 hrs)


Laboratory Tests


4/24/21 04:11: 


White Blood Count 8.1, Red Blood Count 3.24L, Hemoglobin 10.1L, Hematocrit 31L, 

Mean Corpuscular Volume 95, Mean Corpuscular Hemoglobin 31, Mean Corpuscular 

Hemoglobin Concent 33, Red Cell Distribution Width 14.8H, Platelet Count 241, 

Mean Platelet Volume 9.0, Immature Granulocyte % (Auto) 2, Neutrophils (%) 

(Auto) 56, Lymphocytes (%) (Auto) 17, Monocytes (%) (Auto) 10, Eosinophils (%) 

(Auto) 15H, Basophils (%) (Auto) 0, Neutrophils # (Auto) 4.5, Lymphocytes # 

(Auto) 1.4, Monocytes # (Auto) 0.8, Eosinophils # (Auto) 1.2H, Basophils # 

(Auto) 0.0, Immature Granulocyte # (Auto) 0.2H, Sodium Level 137, Potassium 

Level 4.6, Chloride Level 108H, Carbon Dioxide Level 19L, Anion Gap 10, Blood 

Urea Nitrogen 46H, Creatinine 4.10#H, Estimat Glomerular Filtration Rate 10, 

BUN/Creatinine Ratio 11, Glucose Level 98, Calcium Level 7.5L, Phosphorus Level 

3.4, Magnesium Level 1.8





Microbiology


4/17/21 MRSA Screen - Final, Complete


          MRSA not isolated


4/17/21 Blood Culture - Final, Complete


          No growth


4/17/21 Urine Culture - Final, Complete


          Escherichia coli


Patient resulted labs reviewed.


Imaging:  Reviewed Imaging Report





Discussion & Recommendations


Discharge Planning:  >30 minutes discharge planning





Discharge


Home Medications:





Active Scripts


Active


Reported


Nitrofurantoin Mono-Mcr 100 mg (Nitrofurantoin Monohyd/M-Cryst) 100 Mg Capsule 1

Ea PO BID


     FILLED 04- #10/5 DAY SUPPLY


Voltaren (Diclofenac Sodium) 100 Gm Gel..gram. 4 Gm TP QID


     APPLY TO KNEES


Simethicone 80 Mg Tab.chew 80 Mg PO HS PRN


Simethicone 80 Mg Tab.chew 80 Mg PO TID


Loperamide (Loperamide HCl) 2 Mg Tablet 2 Mg PO BID PRN


Tylenol Extra Strength (Acetaminophen) 500 Mg Tablet 500 Mg PO Q6H PRN


Vitamin D3 (Cholecalciferol (Vitamin D3)) 25 Mcg Capsule 25 Mcg PO DAILY


Ferrous Sulfate 325 Mg Tablet 325 Mg PO DAILY


Hydrocodone-Acetamin 5-325 mg (Hydrocodone/Acetaminophen) 1 Each Tablet 0.5-1 

Tab PO Q4H PRN


Imodium A-D (Loperamide HCl) 2 Mg Tablet 2 Mg PO BID


Claritin (Loratadine) 10 Mg Tablet 10 Mg PO DAILY PRN





Instructions to patient/family


Please see electronic discharge instructions given to patient.





Copy


Copies To 1:   TY MENA MD, KATELYN M MD              Apr 24, 2021 12:45

## 2021-04-24 NOTE — PHYSICAL THERAPY DAILY NOTE
PT Daily Note-Current


Subjective


Pt. in bed, states she is having a lot of SOB.  She declines out of bed activity

but does agree to LE exercises with therapist encouragement.





Mental Status


Patient Orientation:  Person, Place, Time, Situation


Attachments:  Oxygen





Transfers


SCALE: Activities may be completed with or without assistive devices.





6-Indepedent-patient completes the activity by him/herself with no assistance 

from a helper.


5-Set-up or Clean-up Assistance-helper sets up or cleans up; patient completes 

activity. [place] assists only prior to or  


    following the activity.


4-Supervision or Touching Assistance-helper provides verbal cues and/or 

touching/steadying and/or contact guard assistance as patient completes 

activity. Assistance may be provided   


    throughout the activity or intermittently.


3-Partial/Moderate Assistance-helper does LESS THAN HALF the effort. [place] 

lifts, holds or supports trunk or limbs, but provides less than half the effort.


2-Substantial/Maximal Assistance-helper does MORE THAN HALF the effort. [place] 

lifts or holds trunk or limbs and provides more than half the effort.


2-[place]-vhsu[place] does ALL the effort. Patient does none of the effort to 

complete the activity. Or, the assistance of 2 or more helpers is required for 

the patient to complete the  


    activity.


If activity was not attempted, code reason:


7-Patient Refused.


9-Not Applicable-not attempted and the patient did not perform the activity 

before the current illness, exacerbation or injury.


10-Not Attempted due to Environmental Limitations-(lack of equipment, weather 

restraints, etc.).


88-Not Attempted due to Medical Conditions or Safety Concerns.





Exercises


Supine Ex:  Ankle pumps, Quad Set, Short Arc Quads, Straight leg raise, Hip 

abd/add


Supine Reps:  20


2 x 10 reps of each exercise





Treatments


LE exercises





Assessment


Current Status:  Fair Progress


Pt. requires therapist assist with SLR, hip abd, and SAQ.  She fatigues with 

exercises and needs brief rest periods during exercises.  Pt. in bed post 

session with call light and all needs met.





PT Long Term Goals


Long Term Goals


PT Long Term Goals Time Frame:  May 1, 2021


Roll Left & Right (QC):  3


Sit to Lying (QC):  3


Lying-Sitting on Side/Bed(QC):  3


Sit to Stand (QC):  3


Chair/Bed-to-Chair Xfer(QC):  3


Toilet Transfer (QC):  3


Does the Patient Walk:  Yes


Walk 10 feet (QC):  3


Walk 50ft with 2 Turns (QC):  3





PT Plan


Treatment/Plan


Treatment Plan:  Continue Plan of Care


Treatment Plan:  Bed Mobility, Education, Functional Activity Ashley, Functional 

Strength, Gait, Safety, Therapeutic Exercise, Transfers


Treatment Duration:  May 1, 2021


Frequency:  6 times per week


Estimated Hrs Per Day:  .25 hour per day


Patient and/or Family Agrees t:  Yes





Time/GCodes


Time In:  0934


Time Out:  0948


Total Billed Treatment Time:  14


Total Billed Treatment


1, Ex 14'











RASHID JASON PT            Apr 24, 2021 11:03

## 2022-11-21 NOTE — DIAGNOSTIC IMAGING REPORT
INDICATION: Blood in stool.



DISCUSSION: Supine and upright views of the abdomen and chest

were obtained, comparison 06/07/2017. Changes of chronic lung

disease are noted with pulmonary hyperinflation and interstitial

thickening. Normal heart size. No pleural fluid or pneumothorax.

Levoscoliosis of the thoracolumbar spine is stable.

Nonobstructive unremarkable bowel gas pattern. No constipation.

Nondilated small bowel loops are noted which could be seen with

enteritis or other inflammatory etiologies. Recommend clinical

correlation.



IMPRESSION:

1. Nonobstructive abnormal small bowel gas pattern is nonspecific

though could be seen with enteritis. No evidence of obstruction

or free air. No constipation.



Dictated by: 



  Dictated on workstation # [id number] Anesthesia Type: 2% lidocaine with epinephrine